# Patient Record
Sex: FEMALE | Race: WHITE
[De-identification: names, ages, dates, MRNs, and addresses within clinical notes are randomized per-mention and may not be internally consistent; named-entity substitution may affect disease eponyms.]

---

## 2019-07-30 ENCOUNTER — HOSPITAL ENCOUNTER (EMERGENCY)
Dept: HOSPITAL 97 - ER | Age: 69
Discharge: HOME | End: 2019-07-30
Payer: COMMERCIAL

## 2019-07-30 DIAGNOSIS — S76.011A: Primary | ICD-10-CM

## 2019-07-30 DIAGNOSIS — J44.9: ICD-10-CM
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
## 2019-07-30 DIAGNOSIS — I73.00: ICD-10-CM

## 2019-07-30 DIAGNOSIS — E03.9: ICD-10-CM

## 2019-07-30 DIAGNOSIS — Z88.5: ICD-10-CM

## 2019-07-30 PROCEDURE — 73502 X-RAY EXAM HIP UNI 2-3 VIEWS: CPT

## 2019-07-30 PROCEDURE — 99284 EMERGENCY DEPT VISIT MOD MDM: CPT

## 2019-07-30 PROCEDURE — 96375 TX/PRO/DX INJ NEW DRUG ADDON: CPT

## 2019-07-30 PROCEDURE — 72170 X-RAY EXAM OF PELVIS: CPT

## 2019-07-30 PROCEDURE — 72192 CT PELVIS W/O DYE: CPT

## 2019-07-30 PROCEDURE — 96374 THER/PROPH/DIAG INJ IV PUSH: CPT

## 2019-07-30 NOTE — EDPHYS
Physician Documentation                                                                           

 [facility]                                                                 

Name: Yarelis Maurice                                                                                

Age: 69 yrs                                                                                       

Sex: Female                                                                                       

: 1950                                                                                   

MRN: [id number]                                                                                   

Arrival Date: 2019                                                                          

Time: 08:07                                                                                       

Account#: [id number]                                                                            

Bed 15                                                                                            

Private MD:                                                                                       

ED Physician Steffen Salazar                                                                         

HPI:                                                                                              

                                                                                             

08:07 This 69 yrs old  Female presents to ER via Unassigned with complaints of Hip   rn  

      Pain.                                                                                       

08:07 The patient or guardian reports decreased range of motion, pain. that occurred at home, rn  

      sustained from standing and rotation the right lower extremity is shortened, the right      

      leg is internally rotated, The patient is able to ambulate with assistance. The patient     

      is able to bear partial body weight. There is no radiation of the patient's discomfort.     

      The complaints affect the right hip. Onset: The symptoms/episode began/occurred this        

      morning. Modifying factors: The symptoms are alleviated by nothing, the symptoms are        

      aggravated by nothing. Associated signs and symptoms: Loss of consciousness: the            

      patient experienced no loss of consciousness, Pertinent positives: None. Pertinent          

      negatives: abdominal pain, fever, incontinence, vomiting, weakness. Severity of             

      symptoms: At their worst the symptoms were moderate, in the emergency department the        

      symptoms are unchanged. The patient has not experienced similar symptoms in the past.       

      Reports walking/standing, rotated and felt pain to right hip, was able to "hop around",     

      no fall or direct trauma, did not hear pop or snap. Hurts to move right hip. .              

                                                                                                  

Historical:                                                                                       

- Allergies:                                                                                      

08:20 Codeine;                                                                                jl7 

- Home Meds:                                                                                      

08:20 alprazolam 0.25 mg oral tab [Active]; amitriptyline 50 mg Oral tab 2 times per day      jl7 

      [Active]; levothyroxine 25 mcg oral tab [Active]; Vitamin D Oral [Active]; alendronate      

      70 mg oral tab once wkly [Active]; omeprazole 20 mg Oral cpDR 1 cap 2 times per day         

      [Active]; sulfasalazine 500 mg Oral tab [Active];                                           

- PMHx:                                                                                           

08:20 COPD; Hypothyroidism; Raynaud's Disease; Scleroderma; Rheumatoid Arthritis; Born with 1 jl7 

      kidney; Osteoporosis;                                                                       

- PSHx:                                                                                           

08:20 Hysterectomy; rectal reconstruction;                                                    jl7 

                                                                                                  

- Immunization history:: Adult Immunizations unknown.                                             

- Family history:: not pertinent.                                                                 

- Ebola Screening: : No symptoms or risks identified at this time.                                

- Social history:: Smoking status: unknown.                                                       

- Hospitalizations: : No recent hospitalization is reported.                                      

                                                                                                  

                                                                                                  

ROS:                                                                                              

08:07 Constitutional: Negative for fever, chills, and weight loss, Abdomen/GI: Negative for   rn  

      abdominal pain, nausea, vomiting, diarrhea, and constipation, Back: Negative for injury     

      and pain, MS/Extremity: + right hip injury and pain Skin: Negative for injury, rash,        

      and discoloration, Neuro: Negative for headache, weakness, numbness, tingling, and          

      seizure.                                                                                    

                                                                                                  

Exam:                                                                                             

08:07 Constitutional:  This is a well developed, well nourished patient who is awake, alert,  rn  

      pain when being moved over to bed from stretcher Skin:  Warm, dry with normal turgor.       

      Normal color with no rashes, no lesions, and no evidence of cellulitis. MS/ Extremity:      

      Pulses equal, no cyanosis.  Neurovascular intact.  RLE passive internal rotation and        

      shortened, painful ROM at hip, able to rotate in and out with hip with less pain, focal     

      tenderness over right hip                                                                   

                                                                                                  

Vital Signs:                                                                                      

08:20  / 59; Pulse 69; Resp 18; Temp 97.7; Pulse Ox 100% ; Pain 10/10;                  jl7 

09:10  / 71; Pulse 60; Resp 16; Pulse Ox 100% ; Pain 0/10;                              jl7 

09:10 Pain 0/10;                                                                              jl7 

10:30  / 70; Pulse 61; Resp 16; Pulse Ox 98% ; Pain 0/10;                               jl7 

10:49  / 57; Pulse 71; Resp 16; Pulse Ox 100% ; Pain 0/10;                              jl7 

                                                                                                  

MDM:                                                                                              

08:07 Patient medically screened.                                                             rn  

10:28 ED course: No gross changes in ECG compared to previous on 19.                     rn  

10:28 Differential diagnosis: hip fracture, intertrochanteric fracture, femoral neck          rn  

      fracture, bursitis, arthritis, strain. Data reviewed: vital signs, nurses notes,            

      radiologic studies, CT scan, plain films, and as a result, I will discharge patient.        

      Counseling: I had a detailed discussion with the patient and/or guardian regarding: the     

      historical points, exam findings, and any diagnostic results supporting the                 

      discharge/admit diagnosis, radiology results, the need for outpatient follow up, to         

      return to the emergency department if symptoms worsen or persist or if there are any        

      questions or concerns that arise at home. Response to treatment: the patient's symptoms     

      have markedly improved after treatment, and as a result, I will discharge patient.          

      Special discussion: I discussed with the patient/guardian in detail that at this point      

      there is no indication for admission to the hospital. It is understood, however, that       

      if the symptoms persist or worsen the patient needs to return immediately for               

      re-evaluation.                                                                              

                                                                                                  

                                                                                             

08:07 Order name: XRAY Pelvis; Complete Time: 09:                                           rn  

                                                                                             

08:07 Order name: XRAY Hip RIGHT 2 view; Complete Time: :                                 rn  

                                                                                             

09:46 Order name: CT Pelvis wo Cont; Complete Time: 10:                                     rn  

                                                                                                  

Administered Medications:                                                                         

08:38 Drug: Zofran 4 mg Route: IVP; Site: right antecubital;                                  jl7 

09:29 Follow up: Response: No adverse reaction                                                jl7 

08:40 Drug: morphine 2 mg Route: IVP; Site: right antecubital;                                jl7 

09:10 Follow up: Pain 0/10 Adult; Response: No adverse reaction; Pain is decreased            jl7 

10:33 Drug: morphine 2 mg Route: IVP; Site: right antecubital;                                jl7 

10:46 Follow up: Response: No adverse reaction; Pain is decreased                             jl7 

10:33 Drug: Flexeril 10 mg Route: PO;                                                         jl7 

10:46 Follow up: Response: No adverse reaction                                                jl7 

                                                                                                  

                                                                                                  

Disposition:                                                                                      

19 10:29 Discharged to Home. Impression: Strain of muscle, fascia and tendon of right       

  hip.                                                                                            

- Condition is Stable.                                                                            

- Discharge Instructions: Muscle Strain, Hip Pain.                                                

- Prescriptions for Ultram 50 mg Oral Tablet - take 1 tablet by ORAL route every 6                

  hours As needed; 15 tablet. Cyclobenzaprine 10 mg Oral Tablet - take 1 tablet by ORAL           

  route every 8 hours As needed; 15 tablet. promethazine 25 mg Oral Tablet - take 1               

  tablet by ORAL route every 6 hours As needed; 20 tablet.                                        

- Medication Reconciliation Form, Thank You Letter, Antibiotic Education, Prescription            

  Opioid Use form.                                                                                

- Follow up: Private Physician; When: As needed; Reason: Recheck today's complaints,              

  Re-evaluation by your physician.                                                                

- Problem is new.                                                                                 

- Symptoms have improved.                                                                         

                                                                                                  

                                                                                                  

                                                                                                  

Signatures:                                                                                       

Dispatcher [employer]                           EDMS                                                 

Salazar, Steffen, MD                        MD   rn                                                   

Alaniz, Jahala, RN                        RN   jl7                                                  

                                                                                                  

Corrections: (The following items were deleted from the chart)                                    

10:50 10:29 2019 10:29 Discharged to Home. Impression: Strain of muscle, fascia and     jl7 

      tendon of right hip. Condition is Stable. Forms are Medication Reconciliation Form,         

      Thank You Letter, Antibiotic Education, Prescription Opioid Use. Follow up: Private         

      Physician; When: As needed; Reason: Recheck today's complaints, Re-evaluation by your       

      physician. Problem is new. Symptoms have improved. rn                                       

                                                                                                  

**************************************************************************************************

## 2019-07-30 NOTE — XMS REPORT
Patient Summary Document

 Created on:2019



Patient:MARKIE CORTES

Sex:Female

:1950

External Reference #:[id number]





Demographics







 Address  [address] [place] [address]

 

 Home Phone  [phone]

 

 Email Address  [email or web address]

 

 Preferred Language  Unknown

 

 Marital Status  Unknown

 

 [employer] Affiliation  Unknown

 

 Race  Unknown

 

 Additional Race(s)  Unavailable

 

 Ethnic Group  Unknown









Author







 Organization  [facility]

 

 Address  [address] [place] [address] [address]. Franks [address] [place] [id number]

 

 Phone  [phone]









Care Team Providers







 Name  Role  Phone

 

 Unavailable  Unavailable  Unavailable









Problems

This patient has no known problems.



Allergies, Adverse Reactions, Alerts

This patient has no known allergies or adverse reactions.



Medications

This patient has no known medications.

## 2019-07-30 NOTE — RAD REPORT
EXAM DESCRIPTION:  RAD - Pelvis - 7/30/2019 9:12 am

 

CLINICAL HISTORY:  PAIN

 

COMPARISON:  No comparisons

 

FINDINGS:  No fracture, dislocation or radiographic evidence of AVN.

 

IMPRESSION:  Negative study.

## 2019-07-30 NOTE — RAD REPORT
EXAM DESCRIPTION:  RAD - Hip Right 2 View - 7/30/2019 9:12 am

 

CLINICAL HISTORY:  PAIN

 

COMPARISON:  No comparisons

 

FINDINGS:  No fracture or dislocation seen. No finding to indicate AVN. Vascular calcification eviden
t.

## 2019-07-30 NOTE — RAD REPORT
EXAM DESCRIPTION:  CT - Pelvis Wo Cont - 7/30/2019 10:09 am

 

CLINICAL HISTORY:  PAIN

Trauma, fall, right-sided hip and pelvic pain

 

COMPARISON:  Pelvis dated 7/30/2019; Hip Right 2 View dated 7/30/2019

 

TECHNIQUE:  All CT scans are performed using dose optimization technique as appropriate and may inclu
de automated exposure control or mA/KV adjustment according to patient size.

 

FINDINGS:  No acute fracture or dislocation is seen. No evidence of AVN. No pelvic fracture seen. Sac
ral ala are intact. Moderate lower lumbar degenerative changes.

 

No pelvic mass or hematoma seen.

 

IMPRESSION:  No acute finding demonstrated.

## 2019-07-30 NOTE — ER
Nurse's Notes                                                                                     

 [facility]                                                                 

Name: Yarelis Maurice                                                                                

Age: 69 yrs                                                                                       

Sex: Female                                                                                       

: 1950                                                                                   

MRN: [id number]                                                                                   

Arrival Date: 2019                                                                          

Time: 08:07                                                                                       

Account#: [id number]                                                                            

Bed 15                                                                                            

Private MD:                                                                                       

Diagnosis: Strain of muscle, fascia and tendon of right hip                                       

                                                                                                  

Presentation:                                                                                     

                                                                                             

08:12 Presenting complaint: EMS states: Pt sitting in chair, got up and turned and felt       jl7 

      immediate pain to her right hip. Transition of care: patient was not received from          

      another setting of care. Onset of symptoms was 2019. Risk Assessment: Do you       

      want to hurt yourself or someone else? Patient reports no desire to harm self or            

      others. Initial Sepsis Screen: Does the patient meet any 2 criteria? No. Patient's          

      initial sepsis screen is negative. Does the patient have a suspected source of              

      infection? No. Patient's initial sepsis screen is negative. Care prior to arrival:          

      Medication(s) given: Tylenol, 1000 mg.                                                      

08:12 Method Of Arrival: EMS: [facility]                                                jl7 

08:12 Acuity: VALERIE 4                                                                           jl7 

                                                                                                  

Historical:                                                                                       

- Allergies:                                                                                      

08:20 Codeine;                                                                                jl7 

- Home Meds:                                                                                      

08:20 alprazolam 0.25 mg oral tab [Active]; amitriptyline 50 mg Oral tab 2 times per day      jl7 

      [Active]; levothyroxine 25 mcg oral tab [Active]; Vitamin D Oral [Active]; alendronate      

      70 mg oral tab once wkly [Active]; omeprazole 20 mg Oral cpDR 1 cap 2 times per day         

      [Active]; sulfasalazine 500 mg Oral tab [Active];                                           

- PMHx:                                                                                           

08:20 COPD; Hypothyroidism; Raynaud's Disease; Scleroderma; Rheumatoid Arthritis; Born with 1 jl7 

      kidney; Osteoporosis;                                                                       

- PSHx:                                                                                           

08:20 Hysterectomy; rectal reconstruction;                                                    jl7 

                                                                                                  

- Immunization history:: Adult Immunizations unknown.                                             

- Family history:: not pertinent.                                                                 

- Ebola Screening: : No symptoms or risks identified at this time.                                

- Social history:: Smoking status: unknown.                                                       

- Hospitalizations: : No recent hospitalization is reported.                                      

                                                                                                  

                                                                                                  

Screenin:10 Abuse screen: Denies threats or abuse. Denies injuries from another. Nutritional        jl7 

      screening: No deficits noted. Tuberculosis screening: No symptoms or risk factors           

      identified. Fall Risk No fall in past 12 months (0 pts). No secondary diagnosis (0          

      pts). IV access (20 points). Ambulatory Aid- None/Bed Rest/Nurse Assist (0 pts). Gait-      

      Impaired (20 pts.). Mental Status- Oriented to own ability (0 pts). Total Salas Fall        

      Scale indicates Low Risk Score (25-44 pts). Fall prevention measures have been              

      instituted. Side Rails Up X 2 Placed close to Nursing Station Frequent Obs/Assesments       

      occuring Family Present and informed to notify staff if they need to leave bedside As       

      available Patient and Family Educated on Fall Prevention Program and strategies.            

                                                                                                  

Assessment:                                                                                       

09:10 Reassessment: Patient appears in no apparent distress at this time. No changes from     [facility] 

      previously documented assessment. Patient and/or family updated on plan of care and         

      expected duration. Pain level reassessed. Patient is alert, oriented x 3, equal             

      unlabored respirations, skin warm/dry/pink. Pt states "I have no pain right now but         

      when a spasm hits it's off the charts." Patient states feeling better. Patient states       

      symptoms have improved. Pain: Denies pain.                                                  

09:34 Reassessment: Dr. Salazar at bedside discussing plan of care.                             jl7 

09:56 Reassessment: Assisted pt to bathroom, pt able to ambulate slowly with assistance at    [facility] 

      this time.                                                                                  

10:30 Reassessment: Patient appears in no apparent distress at this time. No changes from     7 

      previously documented assessment. Patient and/or family updated on plan of care and         

      expected duration. Pain level reassessed. Patient is alert, oriented x 3, equal             

      unlabored respirations, skin warm/dry/pink. Patient denies pain at this time.               

                                                                                                  

Vital Signs:                                                                                      

08:20  / 59; Pulse 69; Resp 18; Temp 97.7; Pulse Ox 100% ; Pain 10/10;                  jl7 

09:10  / 71; Pulse 60; Resp 16; Pulse Ox 100% ; Pain 0/10;                              jl7 

09:10 Pain 0/10;                                                                              jl7 

10:30  / 70; Pulse 61; Resp 16; Pulse Ox 98% ; Pain 0/10;                               jl7 

10:49  / 57; Pulse 71; Resp 16; Pulse Ox 100% ; Pain 0/10;                              jl7 

                                                                                                  

ED Course:                                                                                        

08:07 Patient arrived in ED.                                                                  hj  

08:07 Steffen Salazar MD is Attending Physician.                                                rn  

08:12 Shauna Alaniz RN is Primary Nurse.                                                      jl7 

08:14 Triage completed.                                                                       jl7 

08:20 Arm band placed on right wrist.                                                         jl7 

08:30 Patient has correct armband on for positive identification. Bed in low position. Call   jl7 

      light in reach. Side rails up X2. Pulse ox on. NIBP on. Warm blanket given.                 

08:35 No provider procedures requiring assistance completed. Inserted saline lock: 22 gauge   jl7 

      in right antecubital area, using aseptic technique.                                         

09:18 XRAY Pelvis In Process Unspecified.                                                     EDMS

09:18 XRAY Hip RIGHT 2 view In Process Unspecified.                                           EDMS

09:32 Warm blanket given.                                                                     jl7 

10:13 CT Pelvis wo Cont In Process Unspecified.                                               EDMS

10:20 CT completed. Patient tolerated procedure well. Patient moved to CT via stretcher.      jg6 

      Patient moved back from CT.                                                                 

10:50 IV discontinued, intact, bleeding controlled, No redness/swelling at site. Pressure     jl7 

      dressing applied.                                                                           

                                                                                                  

Administered Medications:                                                                         

08:38 Drug: Zofran 4 mg Route: IVP; Site: right antecubital;                                  jl7 

09:29 Follow up: Response: No adverse reaction                                                jl7 

08:40 Drug: morphine 2 mg Route: IVP; Site: right antecubital;                                jl7 

09:10 Follow up: Pain 0/10 Adult; Response: No adverse reaction; Pain is decreased            jl7 

10:33 Drug: morphine 2 mg Route: IVP; Site: right antecubital;                                jl7 

10:46 Follow up: Response: No adverse reaction; Pain is decreased                             jl7 

10:33 Drug: Flexeril 10 mg Route: PO;                                                         jl7 

10:46 Follow up: Response: No adverse reaction                                                jl7 

                                                                                                  

                                                                                                  

Outcome:                                                                                          

10:29 Discharge ordered by MD.                                                                rn  

10:49 Discharged to home via wheelchair, with family.                                         jl7 

10:49 Condition: stable                                                                           

10:49 Discharge instructions given to patient, family, Instructed on discharge instructions,      

      follow up and referral plans. medication usage, Demonstrated understanding of               

      instructions, follow-up care, medications.                                                  

10:50 Patient left the ED.                                                                    jl7 

                                                                                                  

Signatures:                                                                                       

Dispatcher [employer]                           EDMS                                                 

Steffen Salazar MD MD rn Joaquin, Henry, RN RN hj Leal, Jahala, RN RN jl7 Garcia, Jessica                              jg6                                                  

                                                                                                  

**************************************************************************************************

## 2021-06-30 ENCOUNTER — HOSPITAL ENCOUNTER (INPATIENT)
Dept: HOSPITAL 97 - ER | Age: 71
LOS: 4 days | Discharge: HOME HEALTH | DRG: 545 | End: 2021-07-04
Attending: HOSPITALIST | Admitting: HOSPITALIST
Payer: COMMERCIAL

## 2021-06-30 VITALS — BODY MASS INDEX: 27.3 KG/M2

## 2021-06-30 DIAGNOSIS — E83.42: ICD-10-CM

## 2021-06-30 DIAGNOSIS — E87.5: ICD-10-CM

## 2021-06-30 DIAGNOSIS — J44.9: ICD-10-CM

## 2021-06-30 DIAGNOSIS — I73.00: ICD-10-CM
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
## 2021-06-30 DIAGNOSIS — S42.301A: ICD-10-CM

## 2021-06-30 DIAGNOSIS — M81.0: ICD-10-CM

## 2021-06-30 DIAGNOSIS — G93.41: ICD-10-CM

## 2021-06-30 DIAGNOSIS — N18.32: ICD-10-CM

## 2021-06-30 DIAGNOSIS — N17.9: ICD-10-CM

## 2021-06-30 DIAGNOSIS — E86.1: ICD-10-CM

## 2021-06-30 DIAGNOSIS — D63.8: ICD-10-CM

## 2021-06-30 DIAGNOSIS — N17.0: ICD-10-CM

## 2021-06-30 DIAGNOSIS — M06.9: Primary | ICD-10-CM

## 2021-06-30 DIAGNOSIS — I12.9: ICD-10-CM

## 2021-06-30 DIAGNOSIS — N18.30: ICD-10-CM

## 2021-06-30 DIAGNOSIS — E03.9: ICD-10-CM

## 2021-06-30 LAB
ALBUMIN SERPL BCP-MCNC: 3.6 G/DL (ref 3.4–5)
ALP SERPL-CCNC: 88 U/L (ref 45–117)
ALT SERPL W P-5'-P-CCNC: 17 U/L (ref 12–78)
AST SERPL W P-5'-P-CCNC: 17 U/L (ref 15–37)
BLD SMEAR INTERP: (no result)
BUN BLD-MCNC: 26 MG/DL (ref 7–18)
GLUCOSE SERPLBLD-MCNC: 104 MG/DL (ref 74–106)
HCT VFR BLD CALC: 39.2 % (ref 36–45)
INR BLD: 1.1
LYMPHOCYTES # SPEC AUTO: 1.2 K/UL (ref 0.7–4.9)
MAGNESIUM SERPL-MCNC: 1.9 MG/DL (ref 1.8–2.4)
MORPHOLOGY BLD-IMP: (no result)
PMV BLD: 8.4 FL (ref 7.6–11.3)
POTASSIUM SERPL-SCNC: 4.8 MMOL/L (ref 3.5–5.1)
RBC # BLD: 4.19 M/UL (ref 3.86–4.86)
TROPONIN (EMERG DEPT USE ONLY): 0.03 NG/ML (ref 0–0.04)

## 2021-06-30 PROCEDURE — 85025 COMPLETE CBC W/AUTO DIFF WBC: CPT

## 2021-06-30 PROCEDURE — 82947 ASSAY GLUCOSE BLOOD QUANT: CPT

## 2021-06-30 PROCEDURE — 99285 EMERGENCY DEPT VISIT HI MDM: CPT

## 2021-06-30 PROCEDURE — 71045 X-RAY EXAM CHEST 1 VIEW: CPT

## 2021-06-30 PROCEDURE — 94760 N-INVAS EAR/PLS OXIMETRY 1: CPT

## 2021-06-30 PROCEDURE — 90714 TD VACC NO PRESV 7 YRS+ IM: CPT

## 2021-06-30 PROCEDURE — 83880 ASSAY OF NATRIURETIC PEPTIDE: CPT

## 2021-06-30 PROCEDURE — 80048 BASIC METABOLIC PNL TOTAL CA: CPT

## 2021-06-30 PROCEDURE — 84100 ASSAY OF PHOSPHORUS: CPT

## 2021-06-30 PROCEDURE — 93005 ELECTROCARDIOGRAM TRACING: CPT

## 2021-06-30 PROCEDURE — 81001 URINALYSIS AUTO W/SCOPE: CPT

## 2021-06-30 PROCEDURE — 70486 CT MAXILLOFACIAL W/O DYE: CPT

## 2021-06-30 PROCEDURE — 87086 URINE CULTURE/COLONY COUNT: CPT

## 2021-06-30 PROCEDURE — 36415 COLL VENOUS BLD VENIPUNCTURE: CPT

## 2021-06-30 PROCEDURE — 72125 CT NECK SPINE W/O DYE: CPT

## 2021-06-30 PROCEDURE — 87088 URINE BACTERIA CULTURE: CPT

## 2021-06-30 PROCEDURE — 90471 IMMUNIZATION ADMIN: CPT

## 2021-06-30 PROCEDURE — 85610 PROTHROMBIN TIME: CPT

## 2021-06-30 PROCEDURE — 80076 HEPATIC FUNCTION PANEL: CPT

## 2021-06-30 PROCEDURE — 97530 THERAPEUTIC ACTIVITIES: CPT

## 2021-06-30 PROCEDURE — 97161 PT EVAL LOW COMPLEX 20 MIN: CPT

## 2021-06-30 PROCEDURE — 80069 RENAL FUNCTION PANEL: CPT

## 2021-06-30 PROCEDURE — 70450 CT HEAD/BRAIN W/O DYE: CPT

## 2021-06-30 PROCEDURE — 76377 3D RENDER W/INTRP POSTPROCES: CPT

## 2021-06-30 PROCEDURE — 84484 ASSAY OF TROPONIN QUANT: CPT

## 2021-06-30 PROCEDURE — 97116 GAIT TRAINING THERAPY: CPT

## 2021-06-30 PROCEDURE — 83735 ASSAY OF MAGNESIUM: CPT

## 2021-06-30 RX ADMIN — FENTANYL CITRATE PRN MCG: 50 INJECTION, SOLUTION INTRAMUSCULAR; INTRAVENOUS at 15:40

## 2021-06-30 RX ADMIN — Medication SCH ML: at 21:00

## 2021-06-30 RX ADMIN — SODIUM CHLORIDE SCH MLS: 0.9 INJECTION, SOLUTION INTRAVENOUS at 20:38

## 2021-06-30 RX ADMIN — FENTANYL CITRATE PRN MCG: 50 INJECTION, SOLUTION INTRAMUSCULAR; INTRAVENOUS at 18:48

## 2021-06-30 RX ADMIN — SODIUM CHLORIDE SCH MLS: 0.9 INJECTION, SOLUTION INTRAVENOUS at 15:14

## 2021-06-30 RX ADMIN — HEPARIN SODIUM SCH UNIT: 5000 INJECTION, SOLUTION INTRAVENOUS; SUBCUTANEOUS at 17:00

## 2021-06-30 NOTE — RAD REPORT
EXAM DESCRIPTION:  CT - Head C Spine Mpr Wo Con - 6/30/2021 7:39 am

 

CLINICAL HISTORY:  Head and neck injury status post fall. Head and neck pain

 

COMPARISON:  None.

 

TECHNIQUE:  Computed axial tomography of the head and cervical spine was obtained.

 

Sagittal and coronal reconstruction was performed.

 

All CT scans are performed using dose optimization technique as appropriate and may include automated
 exposure control or mA/KV adjustment according to patient size.

 

FINDINGS:  Right supraorbital hematoma. Minimally displaced right zygomatic arch fracture.

 

Mild low-density areas within periventricular deep white matter likely ischemic changes secondary to 
small vessel disease

 

An intracranial bleed is not seen. The ventricles are normal in caliber. An extra-axial fluid collect
ion is not noted.Fluid within the visualized sinuses and mastoids is not seen

 

A cervical fracture is not visualized. No dislocation is noted.

 

IMPRESSION:  No acute intracranial abnormality is seen.

 

Minimally displaced right zygomatic arch fracture

 

A cervical fracture is not visualized.  If the patient continues to have symptoms to suggest intracra
nial /spinal cord pathology then MRI would be recommended

## 2021-06-30 NOTE — P.HP
Certification for Inpatient


Patient admitted to: Observation


With expected LOS: <2 Midnights


Practitioner: I am a practitioner with admitting privileges, knowledge of 

patient current condition, hospital course, and medical plan of care.


Services: Services provided to patient in accordance with Admission requirements

found in Title 42 Section 412.3 of the Code of Federal Regulations





Patient History


Date of Service: 06/30/21


Reason for admission: Fall with humeral fracture


History of Present Illness: 


[age]-year-old woman with a history of rheumatoid arthritis, hypothyroidism was 

brought to the emergency department after a fall at home.  Patient stated she 

fell when got up from her recliner in the middle of the night to go and sleep on

her bed.  She thought she might have turned around quickly.  She hit the right 

side of her head and sustained laceration temple.  She could not get up and 

walk.  She developed pain.   was woken by their dogs a few hours later 

and found her lying in a pool of blood.  According to the  patient 

responding to him appropriately, no loss of consciousness but she was in pain.  

She was brought to the emergency department where x-rays showing minimal to 

moderately displaced right femoral fracture, no dislocation.  Patient needing a 

lot of pain medications for pain control.  The ED physician wishes to place 

patient under observation for pain control.





Allergies





codeine Allergy (Unverified 11/05/17 06:22)


   Unknown








- Past Medical/Surgical History


Diabetic: No


-: Rheumatoid arthritis


-: Raynaud's disease


-: Hypothyroidism


-: Hysterectomy


-: Rectal reconstruction surgery





- Family History


  ** Father


-: Cancer (Lungs)





  ** Brother


-: Cancer (Lung)





- Social History


Smoking Status: Never smoker


Alcohol use: No


CD- Drugs: No


Place of Residence: Home





Review of Systems


Other: 


Patient denied any dizziness, or palpitation or chest pain or diaphoresis 

preceding the fall.





She denies any fever or chills or abdominal pain or nausea.





She denies any diarrhea or vomiting.





Except as documented, all other systems reviewed and negative.





Physical Examination





- Physical Exam


General: Alert, In no apparent distress, Oriented x3


HEENT: Mucous membr. moist/pink, Other (small laceration right temple.)


Neck: Supple, JVD not distended


Respiratory: Clear to auscultation bilaterally, Normal air movement


Cardiovascular: No edema, Regular rate/rhythm, Normal S1 S2


Capillary refill: <2 Seconds


Gastrointestinal: Normal bowel sounds, Soft and benign, Non-distended, No 

tenderness


Musculoskeletal: Tenderness (right arm), Other (cyanotic fingers)


Integumentary: No rashes


Neurological: Normal strength at 5/5 x4 extr, Cranial nerves 3-12 intact


Lymphatics: No axilla or inguinal lymphadenopathy





- Studies


Laboratory Data (last 24 hrs)





06/30/21 06:46: PT 12.7 H, INR 1.10


06/30/21 06:46: WBC 9.20, Hgb 12.4, Hct 39.2, Plt Count 165


06/30/21 06:46: Sodium 141, Potassium 4.8, BUN 26 H, Creatinine 2.19 H, Glucose 

104, Magnesium 1.9, Total Bilirubin 0.3, AST 17, ALT 17, Alkaline Phosphatase 88








Assessment and Plan





- Problems (Diagnosis)


(1) Fall


Current Visit: Yes   Status: Acute   





(2) Humeral fracture


Current Visit: Yes   Status: Acute   





(3) Rheumatoid arthritis


Current Visit: Yes   Status: Acute   





(4) Acute renal failure


Current Visit: Yes   Status: Acute   





- Plan


Place under observation.


Pain management with IV fentanyl, Norco and Naprosyn


Will also add gabapentin for nerve pain.


Hydrate with normal saline and monitor renal function for improvement.


Consulting nephrology.


Sling for right humeral fracture.


PT to eval low.


24 hr telemetry.








- Advance Directives


Does patient have a Living Will: No


Does patient have a Durable POA for Healthcare: No

## 2021-06-30 NOTE — RAD REPORT
EXAM DESCRIPTION:  RAD - Shoulder  Right 2 View - 6/30/2021 8:25 am

 

CLINICAL HISTORY:  Right shoulder pain

 

FINDINGS:  Mildly to moderately displaced fracture right humeral neck. No dislocation

## 2021-06-30 NOTE — RAD REPORT
EXAM DESCRIPTION:  RAD - Humerus Right - 6/30/2021 8:26 am

 

CLINICAL HISTORY:   Right arm pain status post fall

 

FINDINGS:  Mildly to moderately displaced fracture right humeral neck. No dislocation

## 2021-06-30 NOTE — EDPHYS
Physician Documentation                                                                           

 [facility]                                                                 

Name: Yarelis Maurice                                                                                

Age: 71 yrs                                                                                       

Sex: Female                                                                                       

: 1950                                                                                   

MRN: [id number]                                                                                   

Arrival Date: 2021                                                                          

Time: 06:25                                                                                       

Account#: [id number]                                                                            

Bed 3                                                                                             

Private MD:                                                                                       

ED Physician Mike May                                                                       

HPI:                                                                                              

                                                                                             

06:49 This 71 yrs old  Female presents to ER via EMS with complaints of Fall Injury. ma2 

06:49 Details of fall: The patient fell from seated position, out of a chair. Onset: The      ma2 

      symptoms/episode began/occurred acutely, gradually, 1 hour(s) ago. Severity of              

      symptoms: At their worst the symptoms were mild, in the emergency department the            

      symptoms are unchanged. The patient has experienced similar episodes in the past.           

06:51 lost balance, fell hit her head and right elbow and shoulder .                          ma2 

                                                                                                  

Historical:                                                                                       

- Allergies:                                                                                      

06:33 Codeine;                                                                                bb  

06:33 sulfa;                                                                                  bb  

- Home Meds:                                                                                      

06:33 amitriptyline 50 mg Oral tab 2 times per day [Active]; alprazolam 0.25 mg Oral tab 1    bb  

      tab daily [Active]; alendronate 70 mg Oral tab once wkly [Active]; levothyroxine 25 mcg     

      tab [Active]; omeprazole 20 mg Oral cpDR 1 cap 2 times per day [Active]; sulfasalazine      

      500 mg Oral tab 2 tab after meals [Active]; VItamin D \T\ E [Active];                       

- PMHx:                                                                                           

06:33 born with 1 kidney; COPD; Hypothyroidism; Osteoporosis; Raynaud's Disease; Rheumatoid   bb  

      Arthritis; Scleroderma;                                                                     

- PSHx:                                                                                           

06:33 hysterectomy;                                                                           bb  

                                                                                                  

- Immunization history: Last tetanus immunization: unknown.                                       

- Social history:: Smoking status: Patient denies any tobacco usage or history of.                

- Family history:: not pertinent.                                                                 

                                                                                                  

                                                                                                  

ROS:                                                                                              

06:50 Constitutional: Negative for fever, chills, and weight loss, Cardiovascular: Negative   ma2 

      for chest pain, palpitations, and edema, Respiratory: Negative for shortness of breath,     

      cough, wheezing, and pleuritic chest pain, Abdomen/GI: Negative for abdominal pain,         

      nausea, diarrhea, and constipation.                                                         

06:51 All other systems are negative.                                                         ma2 

                                                                                                  

Exam:                                                                                             

06:51 Constitutional:  This is a well developed, well nourished patient who is awake, alert,  ma2 

      and in no acute distress. Head/Face:  right forehead contusion otherwise Normocephalic      

      Eyes:  Pupils equal round and reactive to light, extra-ocular motions intact.  Lids and     

      lashes normal.  Conjunctiva and sclera are non-icteric and not injected.  Cornea within     

      normal limits.  Periorbital areas with no swelling, redness, or edema. ENT:  Nares          

      patent. No nasal discharge, no septal abnormalities noted.  Tympanic membranes are          

      normal and external auditory canals are clear.  Oropharynx with no redness, swelling,       

      or masses, exudates, or evidence of obstruction, uvula midline.  Mucous membranes           

      moist. Neck:  Trachea midline, no thyromegaly or masses palpated, and no cervical           

      lymphadenopathy.  Supple, full range of motion without nuchal rigidity, or vertebral        

      point tenderness.  No Meningismus. Chest/axilla:  Normal chest wall appearance and          

      motion.  Nontender with no deformity.  No lesions are appreciated. Cardiovascular:          

      Regular rate and rhythm with a normal S1 and S2.  No gallops, murmurs, or rubs.  Normal     

      PMI, no JVD.  No pulse deficits. Respiratory:  Lungs have equal breath sounds               

      bilaterally, clear to auscultation and percussion.  No rales, rhonchi or wheezes noted.     

       No increased work of breathing, no retractions or nasal flaring. Abdomen/GI:  Soft,        

      non-tender, with normal bowel sounds.  No distension or tympany.  No guarding or            

      rebound.  No evidence of tenderness throughout. Back:  No spinal tenderness.  No            

      costovertebral tenderness.  Full range of motion. Skin:  Warm, dry with normal turgor.      

      Normal color with no rashes, no lesions, and no evidence of cellulitis. MS/ Extremity:      

      right elbow pain and right shoulder pain, there is right elbow skin abrasion with skin      

      tear. otherwise Pulses equal, no cyanosis.  Neurovascular intact.  Full, normal range       

      of motion. Neuro:  Awake and alert, GCS 15, oriented to person, place, time, and            

      situation.  Cranial nerves II-XII grossly intact.  Motor strength 5/5 in all                

      extremities.  Sensory grossly intact.  Cerebellar exam normal.  Normal gait.                

                                                                                                  

Vital Signs:                                                                                      

06:26  / 108; Pulse 92; Resp 18 S; Temp 98.3(O); Pulse Ox 93% on R/A; Weight 71.67 kg   bb  

      (R); Height 5 ft. 4 in. (162.56 cm) (R); Pain 10/10;                                        

07:15  / 88; Pulse 89; Resp 17; Pulse Ox 99% on R/A;                                    hb  

08:15  / 71; Pulse 84; Resp 15; Pulse Ox 97% on R/A; Pain 8/10;                         hb  

08:30 Pulse Ox 83% on R/A;                                                                    hb  

08:30 Pulse Ox 96% on 3 lpm NC;                                                               hb  

10:00  / 79; Pulse 66; Resp 15; Pulse Ox 97% on 3 lpm NC; Pain 9/10;                    hb  

11:00  / 78; Pulse 64; Resp 17; Pulse Ox 97% on 2 lpm NC;                               hb  

12:00  / 75; Pulse 68; Resp 15; Pulse Ox 97% on 2 lpm NC;                               hb  

13:00  / 82; Pulse 80; Resp 19; Pulse Ox 98% on 2 lpm NC;                               hb  

14:00  / 86; Pulse 67; Resp 18; Pulse Ox 95% on 2 lpm NC; Pain 9/10;                    hb  

06:26 Body Mass Index 27.12 (71.67 kg, 162.56 cm)                                             bb  

                                                                                                  

Naman Coma Score:                                                                               

06:26 Eye Response: spontaneous(4). Verbal Response: oriented(5). Motor Response: obeys       bb  

      commands(6). Total: 15.                                                                     

                                                                                                  

Trauma Score (Adult):                                                                             

06:26 Eye Response: spontaneous(1); Verbal Response: oriented(1); Motor Response: obeys       bb  

      commands(2); Systolic BP: > 89 mm Hg(4); Respiratory Rate: 10 to 29 per min(4); Hull     

      Score: 15; Trauma Score: 12                                                                 

07:15 Eye Response: spontaneous(1); Verbal Response: oriented(1); Motor Response: obeys       hb  

      commands(2); Systolic BP: > 89 mm Hg(4); Respiratory Rate: 10 to 29 per min(4); Naman     

      Score: 15; Trauma Score: 12                                                                 

08:15 Eye Response: spontaneous(1); Verbal Response: oriented(1); Motor Response: obeys       hb  

      commands(2); Systolic BP: > 89 mm Hg(4); Respiratory Rate: 10 to 29 per min(4); Naman     

      Score: 15; Trauma Score: 12                                                                 

10:00 Eye Response: spontaneous(1); Verbal Response: oriented(1); Motor Response: obeys       hb  

      commands(2); Systolic BP: > 89 mm Hg(4); Respiratory Rate: 10 to 29 per min(4); Hull     

      Score: 15; Trauma Score: 12                                                                 

11:00 Eye Response: spontaneous(1); Verbal Response: oriented(1); Motor Response: obeys       hb  

      commands(2); Systolic BP: > 89 mm Hg(4); Respiratory Rate: 10 to 29 per min(4); Hull     

      Score: 15; Trauma Score: 12                                                                 

12:00 Eye Response: spontaneous(1); Verbal Response: oriented(1); Motor Response: obeys       hb  

      commands(2); Systolic BP: > 89 mm Hg(4); Respiratory Rate: 10 to 29 per min(4); Hull     

      Score: 15; Trauma Score: 12                                                                 

13:00 Eye Response: spontaneous(1); Verbal Response: oriented(1); Motor Response: obeys       hb  

      commands(2); Systolic BP: > 89 mm Hg(4); Respiratory Rate: 10 to 29 per min(4); Naman     

      Score: 15; Trauma Score: 12                                                                 

14:00 Eye Response: spontaneous(1); Verbal Response: oriented(1); Motor Response: obeys       hb  

      commands(2); Systolic BP: > 89 mm Hg(4); Respiratory Rate: 10 to 29 per min(4); Hull     

      Score: 15; Trauma Score: 12                                                                 

                                                                                                  

MDM:                                                                                              

06:25 Patient medically screened.                                                             ma2 

06:51 Differential diagnosis: closed head injury, contusion, fracture, sprain, strain.        ma2 

09:47 Data reviewed: vital signs, nurses notes, lab test result(s), radiologic studies.       kdr 

      Counseling: I had a detailed discussion with the patient and/or guardian regarding: the     

      historical points, exam findings, and any diagnostic results supporting the                 

      discharge/admit diagnosis, lab results, radiology results, the need for further work-up     

      and treatment in the hospital.                                                              

                                                                                                  

                                                                                             

06:26 Order name: Basic Metabolic Panel; Complete Time: 08:43                                                                                                                              

06:26 Order name: CBC with Diff; Complete Time: 08:43                                                                                                                                      

06:26 Order name: LFT's; Complete Time: 08:43                                                                                                                                              

06:26 Order name: Magnesium; Complete Time: 08:43                                             ma2 

                                                                                             

06:26 Order name: NT PRO-BNP; Complete Time: 08:43                                            ma2 

                                                                                             

06:26 Order name: PT-INR; Complete Time: 08:43                                                ma2 

                                                                                             

06:26 Order name: CT Head C Spine; Complete Time: 08:43                                       ma2 

                                                                                             

06:26 Order name: Troponin (emerg Dept Use Only); Complete Time: 08:43                        ma2 

                                                                                             

06:26 Order name: XRAY Chest (1 view); Complete Time: 08:43                                   ma2 

                                                                                             

06:42 Order name: Facial Bones W/O Con CT; Complete Time: 08:43                               ma2 

                                                                                             

06:42 Order name: Shoulder Right (2 View) XRAY; Complete Time: 08:43                          ma2 

                                                                                             

06:42 Order name: Humerus Right XRAY; Complete Time: 08:43                                    ma2 

                                                                                             

07:03 Order name: CBC Smear Scan; Complete Time: 08:43                                        EDMS

                                                                                             

06:26 Order name: EKG; Complete Time: 06:27                                                   ma2 

                                                                                             

06:26 Order name: Cardiac monitoring; Complete Time: 06:58                                    ma2 

                                                                                             

06:26 Order name: EKG - Nurse/Tech; Complete Time: 06:58                                      ma2 

                                                                                             

06:26 Order name: IV Saline Lock; Complete Time: 06:35                                        ma2 

                                                                                             

06:26 Order name: Labs collected and sent; Complete Time: 06:58                               ma2 

                                                                                             

06:26 Order name: O2 Per Protocol; Complete Time: 06:35                                       ma2 

                                                                                             

06:26 Order name: O2 Sat Monitoring; Complete Time: 06:35                                     ma2 

                                                                                             

06:42 Order name: Dressing - Wound; Complete Time: 13:40                                      ma2 

                                                                                             

08:25 Order name: Elbow Right 2 View; Complete Time: 08:43                                    EDMS

                                                                                                  

Administered Medications:                                                                         

07:00 Drug: Ketorolac 30 mg Route: IVP; Site: left hand;                                      bb  

07:11 Follow up: Response: No adverse reaction                                                bb  

07:00 Drug: NS 0.9% 1000 ml Route: IV; Rate: 1 bolus; Site: left hand;                        bb  

07:00 Drug: Tetanus-Diphtheria Toxoid Adult 0.5 ml {: "[employer] Exp:         bb  

      2023. Lot #: [id number]. } Route: IM; Site: left deltoid;                                  

07:12 Follow up: Response: No adverse reaction                                                bb  

08:29 Drug: morphine 2 mg Route: IVP; Site: left wrist;                                       hb  

08:29 Drug: Zofran (Ondansetron) 4 mg Route: IVP; Site: left wrist;                           hb  

09:10 Follow up: Response: No adverse reaction                                                hb  

10:29 Drug: morphine 4 mg Route: IVP; Site: left antecubital;                                 hb  

13:40 Drug: morphine 2 mg Route: IVP; Site: left antecubital;                                 hb  

                                                                                                  

                                                                                                  

Disposition Summary:                                                                              

21 09:47                                                                                    

Hospitalization Ordered                                                                           

      Hospitalization Status: Observation                                                     kdr 

      Provider: Chapincito Maxwell 

      Condition: Fair                                                                         kdr 

      Problem: new                                                                            kdr 

      Symptoms: have improved                                                                 kdr 

      Bed/Room Type: Standard                                                                 kdr 

      Location: Telemetry/MedSurg (observation)(21 17:57)                               bd  

      Room Assignment: [facility](21 17:57)                                                    bd  

      Diagnosis                                                                                   

        - 2-part displaced fracture of surgical neck of right humerus                         kdr 

      Forms:                                                                                      

        - Medication Reconciliation Form                                                      kdr 

        - SBAR form                                                                           kdr 

Signatures:                                                                                       

Dispatcher [employer]                           EDMS                                                 

Cece Banerjee Kevin, MD MD   kdr                                                  

Yarelis Aquino RN RN bb Smirch, Shelby, RN RN ss Baxter, Heather, RN RN                                                      

Keke Cali MD MD   ma2                                                  

                                                                                                  

Corrections: (The following items were deleted from the chart)                                    

08:25 06:42 Elbow Right 3 View+RAD.RAD.BRZ ordered. EDMS                                      EDMS

14:08 09:47 Telemetry/MedSurg (observation) kdr                                               ss  

14:08 09:47 kdr                                                                               ss  

14:08 14:08 ss                                                                                ss  

17:57 14:08 [facility] ER HOLD ss                                                                   bd  

17:57 14:08 ERHOLD- ss                                                                        bd  

                                                                                                  

**************************************************************************************************

## 2021-06-30 NOTE — XMS REPORT
Continuity of Care Document

                            Created on:2021



Patient:MARKIE CORTES

Sex:Female

:1950

External Reference #:[id number]





Demographics







                          Address                   [address]



                                                    [place] [address]

 

                          Home Phone                [phone]

 

                          Work Phone                [phone]

 

                          Mobile Phone              [phone]

 

                          Email Address             [email or web address]

 

                          Preferred Language        English

 

                          Marital Status            Unknown

 

                          Hindu Affiliation     Unknown

 

                          Race                      Unknown

 

                          Additional Race(s)        Unavailable



                                                    White

 

                          Ethnic Group              Unknown









Author







                          Organization              [facility]

t

 

                          Address                   [address] [place] [address] [address]



                                                    [place] [address]

 

                          Phone                     [phone]









Support







                Name            Relationship    Address         Phone

 

                Josafat           Spouse          [address]   [phone]



                                                [place] [address] 









Care Team Providers







                    Name                Role                Phone

 

                    Doctor Unassigned,  Name Attending Clinician Unavailable









Problems

This patient has no known problems.



Allergies, Adverse Reactions, Alerts

This patient has no known allergies or adverse reactions.



Medications

This patient has no known medications.



Procedures

This patient has no known procedures.



Encounters







        Start   End     Encounter Admission Attending Care    Care    Encounter 

Source



        Date/Time Date/Time Type    Type    Clinicians Facility Department ID   

   

 

        2020 Orders          Doctor  LUIS    [id number]

05 



        00:00:00 00:00:00 Only            UnassignedLC   [id number]       

  



                                        [facility] [id number]         



                                                        [phone]         



                                                        009             







Results

This patient has no known results.

## 2021-06-30 NOTE — RAD REPORT
EXAM DESCRIPTION:  CT - Facial Bones W/ Mpr - 6/30/2021 7:38 am

 

CLINICAL HISTORY:  Facial injury status post fall. Facial pain

 

COMPARISON:   None

 

TECHNIQUE:  Computed axial tomography of the face was obtained. Coronal and sagittal reconstruction w
as performed.

 

All CT scans are performed using dose optimization technique as appropriate and may include automated
 exposure control or mA/KV adjustment according to patient size.

 

FINDINGS:  Minimally displaced fracture right zygomatic arch. Adjacent superficial hematoma.

 

A TMJ dislocation is not noted.

 

The globes are intact. Fluid within the sinuses is not seen.

 

IMPRESSION:  Minimally displaced fracture right zygomatic arch

## 2021-06-30 NOTE — RAD REPORT
EXAM DESCRIPTION:  RAD - Elbow Right 2 View - 6/30/2021 8:26 am

 

CLINICAL HISTORY:  Elbow pain

 

FINDINGS:  Limited two view series.

 

Osteoporosis

 

No fracture or dislocation seen

## 2021-06-30 NOTE — ER
Nurse's Notes                                                                                     

 [facility]                                                                 

Name: Yarelis Maurice                                                                                

Age: 71 yrs                                                                                       

Sex: Female                                                                                       

: 1950                                                                                   

MRN: [id number]                                                                                   

Arrival Date: 2021                                                                          

Time: 06:25                                                                                       

Account#: [id number]                                                                            

Bed 3                                                                                             

Private MD:                                                                                       

Diagnosis: 2-part displaced fracture of surgical neck of right humerus                            

                                                                                                  

Presentation:                                                                                     

                                                                                             

06:26 Chief complaint: EMS states: they were toned out for report of pt having fallen hitting bb  

      her head and injuring her right arm, pt denied LOC. Care prior to arrival: splint to        

      right arm. Mechanism of Injury: Fall out of chair. Trauma event details: Injury             

      occurred in the [facility], Injury occurred: at home. Injury occurred: 2021.                                                                                       

06:26 Acuity: VALERIE 3                                                                           bb  

06:26 Method Of Arrival: EMS: [facility]                                                  

:33 Coronavirus screen: At this time, the client does not indicate any symptoms associated  bb  

      with coronavirus-19. Ebola Screen: No symptoms or risks identified at this time.            

      Initial Sepsis Screen: Does the patient meet any 2 criteria? No. Patient's initial          

      sepsis screen is negative. Does the patient have a suspected source of infection? No.       

      Patient's initial sepsis screen is negative. Risk Assessment: Do you want to hurt           

      yourself or someone else? Patient reports no desire to harm self or others. Onset of        

      symptoms was 2021.                                                                 

                                                                                                  

Historical:                                                                                       

- Allergies:                                                                                      

06:33 Codeine;                                                                                bb  

06:33 sulfa;                                                                                  bb  

- Home Meds:                                                                                      

: amitriptyline 50 mg Oral tab 2 times per day [Active]; alprazolam 0.25 mg Oral tab 1    bb  

      tab daily [Active]; alendronate 70 mg Oral tab once wkly [Active]; levothyroxine 25 mcg     

      tab [Active]; omeprazole 20 mg Oral cpDR 1 cap 2 times per day [Active]; sulfasalazine      

      500 mg Oral tab 2 tab after meals [Active]; VItamin D \T\ E [Active];                       

- PMHx:                                                                                           

06:33 born with 1 kidney; COPD; Hypothyroidism; Osteoporosis; Raynaud's Disease; Rheumatoid   bb  

      Arthritis; Scleroderma;                                                                     

- PSHx:                                                                                           

:33 hysterectomy;                                                                           bb  

                                                                                                  

- Immunization history: Last tetanus immunization: unknown.                                       

- Social history:: Smoking status: Patient denies any tobacco usage or history of.                

- Family history:: not pertinent.                                                                 

                                                                                                  

                                                                                                  

Screenin:26 Abuse screen: Denies threats or abuse. Tuberculosis screening: No symptoms or risk      bb  

      factors identified.                                                                         

06:36 Nutritional screening: No deficits noted. Fall Risk Fall in past 12 months (25 points). bb  

      Secondary diagnosis (15 points) impaired mobility, IV access (20 points). Ambulatory        

      Aid- None/Bed Rest/Nurse Assist (0 pts). Gait- Impaired (20 pts.). Mental Status-           

      Overestimates/Forgets Limitations (15 pts.). Total [place] Fall Scale indicates High Risk     

      Score (45 or more points). Fall prevention measures have been instituted. Side Rails Up     

      X 2 As available patient and family educated on Fall Prevention Program and Strategies.     

                                                                                                  

Primary Survey:                                                                                   

06:26 NO uncontrolled hemorrhage observed. A: The patient is alert. Airway: patent.           bb  

      Breathing/Chest: Respiratory pattern: regular, Respiratory effort: spontaneous.             

      Circulation: Heart tones present. Disability Alert.                                         

07:15 Exposure/Environment: A warming method has been applied: A warm blanket has been        hb  

      provided to the patient.                                                                    

07:15 Reassessment Airway Airway Patent Breathing/Chest Respiratory pattern Regular           hb  

      Respiratory effort Spontaneous Unlabored Chest inspection Symmetrical Circulation Color     

      Pink Disability Alert.                                                                      

08:15 Reassessment Airway Oxygen No O2 Breathing/Chest Respiratory pattern Regular            hb  

      Respiratory effort Spontaneous Unlabored Chest inspection Symmetrical Circulation Color     

      Pink Disability Alert.                                                                      

09:00 Reassessment Airway Airway Patent Oxygen No O2 Breathing/Chest Respiratory pattern      hb  

      Regular Respiratory effort Spontaneous Unlabored Chest inspection Symmetrical               

      Circulation Pulses Palpable Color Pink Disability Alert.                                    

10:00 Reassessment Airway Oxygen Nasal cannula Breathing/Chest Respiratory pattern Regular    hb  

      Respiratory effort Spontaneous Unlabored Chest inspection Symmetrical Circulation           

      Pulses Palpable Color Pink Disability Alert.                                                

11:00 Reassessment Airway Airway Patent Oxygen Nasal cannula Breathing/Chest Respiratory      hb  

      effort Spontaneous Unlabored Chest inspection Symmetrical Circulation Pulses Palpable       

      Color Pink Disability Alert.                                                                

12:00 Reassessment Airway Oxygen Nasal cannula Breathing/Chest Respiratory effort Spontaneous hb  

      Unlabored Circulation Pulses Palpable Color Pink Disability Alert.                          

13:00 Reassessment Airway Oxygen Nasal cannula Circulation Color Pink Disability Alert.       hb  

14:00 Reassessment Airway Oxygen Nasal cannula Breathing/Chest Respiratory effort Spontaneous hb  

      Unlabored Circulation Pulses Palpable Color Pink Disability Alert.                          

                                                                                                  

Secondary Survey:                                                                                 

:26 HEENT: Face Other bruising to right side of face and brow with small skin tear to right bb  

      brow. Gastrointestinal: No deficits noted. Musculoskeletal: Capillary refill < 3            

      seconds, Reports pain in right arm.                                                         

                                                                                                  

Assessment:                                                                                       

06:26 General: Appears in no apparent distress. uncomfortable, Behavior is cooperative,       bb  

      anxious. Pain: Complains of pain in right arm Pain currently is 10 out of 10 on a pain      

      scale. Neuro: Level of Consciousness is awake, alert, obeys commands, Oriented to           

      person, place, situation. Cardiovascular: Heart tones S1 S2 present Capillary refill <      

      3 seconds. Respiratory: Respiratory effort is unlabored, Respiratory pattern is             

      regular. GI: No signs and/or symptoms were reported involving the gastrointestinal          

      system. Derm: Skin is pink, warm \T\ dry. Bruising that is right side of face and brow.     

      Skin tear to right brow and right elbow. Musculoskeletal: Capillary refill < 3 seconds,     

      Reports pain in right arm.                                                                  

07:15 Reassessment: Patient appears in no apparent distress at this time. Patient and/or      hb  

      family updated on plan of care and expected duration. Pain level reassessed. Patient is     

      alert, oriented x 3, equal unlabored respirations, skin warm/dry/pink.                      

08:00 Reassessment: Patient appears in no apparent distress at this time. Patient and/or      hb  

      family updated on plan of care and expected duration. Pain level reassessed. Patient is     

      alert, oriented x 3, equal unlabored respirations, skin warm/dry/pink.                      

09:00 Reassessment: Patient appears in no apparent distress at this time. Patient and/or      hb  

      family updated on plan of care and expected duration. Pain level reassessed. Patient is     

      alert, oriented x 3, equal unlabored respirations, skin warm/dry/pink.                      

10:00 Reassessment: Patient appears in no apparent distress at this time. Patient and/or      hb  

      family updated on plan of care and expected duration. Pain level reassessed. Patient is     

      alert, oriented x 3, equal unlabored respirations, skin warm/dry/pink.                      

11:00 Reassessment: Patient appears in no apparent distress at this time. Patient and/or      hb  

      family updated on plan of care and expected duration. Pain level reassessed. Patient is     

      alert, oriented x 3, equal unlabored respirations, skin warm/dry/pink.                      

12:00 Reassessment: Patient appears in no apparent distress at this time. Patient and/or      hb  

      family updated on plan of care and expected duration. Pain level reassessed. Patient is     

      alert, oriented x 3, equal unlabored respirations, skin warm/dry/pink.                      

13:00 Reassessment: Patient appears in no apparent distress at this time. Patient and/or      hb  

      family updated on plan of care and expected duration. Pain level reassessed. Patient is     

      alert, oriented x 3, equal unlabored respirations, skin warm/dry/pink.                      

19:35 Reassessment: Patient and/or family updated on plan of care and expected duration. Pain ea  

      level reassessed. Patient is alert, oriented x 3, equal unlabored respirations, skin        

      warm/dry/pink. Report given to receiving nurse. Pt left ED via wheelchair per nurse. Pt     

      tolerating well.                                                                            

                                                                                                  

Vital Signs:                                                                                      

06:26  / 108; Pulse 92; Resp 18 S; Temp 98.3(O); Pulse Ox 93% on R/A; Weight 71.67 kg   bb  

      (R); Height 5 ft. 4 in. (162.56 cm) (R); Pain 10/10;                                        

07:15  / 88; Pulse 89; Resp 17; Pulse Ox 99% on R/A;                                    hb  

08:15  / 71; Pulse 84; Resp 15; Pulse Ox 97% on R/A; Pain 8/10;                         hb  

08:30 Pulse Ox 83% on R/A;                                                                    hb  

08:30 Pulse Ox 96% on 3 lpm NC;                                                               hb  

10:00  / 79; Pulse 66; Resp 15; Pulse Ox 97% on 3 lpm NC; Pain 9/10;                    hb  

11:00  / 78; Pulse 64; Resp 17; Pulse Ox 97% on 2 lpm NC;                               hb  

12:00  / 75; Pulse 68; Resp 15; Pulse Ox 97% on 2 lpm NC;                               hb  

13:00  / 82; Pulse 80; Resp 19; Pulse Ox 98% on 2 lpm NC;                               hb  

14:00  / 86; Pulse 67; Resp 18; Pulse Ox 95% on 2 lpm NC; Pain 9/10;                    hb  

06:26 Body Mass Index 27.12 (71.67 kg, 162.56 cm)                                             bb  

                                                                                                  

[place] Coma Score:                                                                               

06:26 Eye Response: spontaneous(4). Verbal Response: oriented(5). Motor Response: obeys       bb  

      commands(6). Total: 15.                                                                     

                                                                                                  

Trauma Score (Adult):                                                                             

06:26 Eye Response: spontaneous(1); Verbal Response: oriented(1); Motor Response: obeys       bb  

      commands(2); Systolic BP: > 89 mm Hg(4); Respiratory Rate: 10 to 29 per min(4); [place]     

      Score: 15; Trauma Score: 12                                                                 

07:15 Eye Response: spontaneous(1); Verbal Response: oriented(1); Motor Response: obeys       hb  

      commands(2); Systolic BP: > 89 mm Hg(4); Respiratory Rate: 10 to 29 per min(4); [place]     

      Score: 15; Trauma Score: 12                                                                 

08:15 Eye Response: spontaneous(1); Verbal Response: oriented(1); Motor Response: obeys       hb  

      commands(2); Systolic BP: > 89 mm Hg(4); Respiratory Rate: 10 to 29 per min(4); [place]     

      Score: 15; Trauma Score: 12                                                                 

10:00 Eye Response: spontaneous(1); Verbal Response: oriented(1); Motor Response: obeys       hb  

      commands(2); Systolic BP: > 89 mm Hg(4); Respiratory Rate: 10 to 29 per min(4); [place]     

      Score: 15; Trauma Score: 12                                                                 

11:00 Eye Response: spontaneous(1); Verbal Response: oriented(1); Motor Response: obeys       hb  

      commands(2); Systolic BP: > 89 mm Hg(4); Respiratory Rate: 10 to 29 per min(4); [place]     

      Score: 15; Trauma Score: 12                                                                 

12:00 Eye Response: spontaneous(1); Verbal Response: oriented(1); Motor Response: obeys       hb  

      commands(2); Systolic BP: > 89 mm Hg(4); Respiratory Rate: 10 to 29 per min(4); [place]     

      Score: 15; Trauma Score: 12                                                                 

13:00 Eye Response: spontaneous(1); Verbal Response: oriented(1); Motor Response: obeys       hb  

      commands(2); Systolic BP: > 89 mm Hg(4); Respiratory Rate: 10 to 29 per min(4); [place]     

      Score: 15; Trauma Score: 12                                                                 

14:00 Eye Response: spontaneous(1); Verbal Response: oriented(1); Motor Response: obeys       hb  

      commands(2); Systolic BP: > 89 mm Hg(4); Respiratory Rate: 10 to 29 per min(4); [place]     

      Score: 15; Trauma Score: 12                                                                 

                                                                                                  

ED Course:                                                                                        

06:25 Patient arrived in ED.                                                                  bb  

06:25 Keke Cali MD is Attending Physician.                                           ma2 

06:26 Patient has correct armband on for positive identification. Bed in low position. Call   bb  

      light in reach. Side rails up X 1. Patient maintains SpO2 saturation greater than 95%       

      on room air.                                                                                

06:26 Patient maintains SpO2 saturation greater than 95% on room air.                         bb  

06:27 Triage completed.                                                                       bb  

06:30 Inserted saline lock: 20 gauge in right wrist, using aseptic technique.                 [id number] 

06:36 Arm band placed on.                                                                     bb  

06:36 Thermoregulation: warm blanket given to patient.                                        bb  

07:07 Attending Physician role handed off by Keke Cali MD                            kdr 

07:07 Mike May MD is Attending Physician.                                              kdr 

07:19 XRAY Chest (1 view) In Process Unspecified.                                             EDMS

07:38 Facial Bones W/O Con CT In Process Unspecified.                                         EDMS

07:39 CT Head C Spine In Process Unspecified.                                                 EDMS

08:25 Shoulder Right (2 View) XRAY In Process Unspecified.                                    EDMS

08:25 Humerus Right XRAY In Process Unspecified.                                              EDMS

08:25 Elbow Right 2 View In Process Unspecified.                                              EDMS

08:31 Fiordaliza Dunbar RN is Primary Nurse.                                                   hb  

09:44 Chapincito Maxwell is Hospitalizing Provider.                                               kdr 

10:13 Inserted saline lock: 22 gauge in left antecubital area, using aseptic technique. Blood ss  

      collected.                                                                                  

14:00 No provider procedures requiring assistance completed. Patient admitted, IV remains in  hb  

      place.                                                                                      

19:35 Primary Nurse role handed off by Fiordaliza Dunbar RN                                    [id number] 

                                                                                                  

Administered Medications:                                                                         

07:00 Drug: Ketorolac 30 mg Route: IVP; Site: left hand;                                      bb  

07:11 Follow up: Response: No adverse reaction                                                bb  

07:00 Drug: NS 0.9% 1000 ml Route: IV; Rate: 1 bolus; Site: left hand;                        bb  

07:00 Drug: Tetanus-Diphtheria Toxoid Adult 0.5 ml {: [employer] [facility]. Exp:         bb  

      2023. Lot #: [id number]. } Route: IM; Site: left deltoid;                                  

07:12 Follow up: Response: No adverse reaction                                                bb  

08:29 Drug: morphine 2 mg Route: IVP; Site: left wrist;                                       hb  

08:29 Drug: Zofran (Ondansetron) 4 mg Route: IVP; Site: left wrist;                           hb  

09:10 Follow up: Response: No adverse reaction                                                hb  

10:29 Drug: morphine 4 mg Route: IVP; Site: left antecubital;                                 hb  

13:40 Drug: morphine 2 mg Route: IVP; Site: left antecubital;                                 hb  

                                                                                                  

                                                                                                  

Intake:                                                                                           

06:26 PO: 0ml; Total: 0ml.                                                                    bb  

                                                                                                  

Outcome:                                                                                          

09:47 Decision to Hospitalize by Provider.                                                    kdr 

14:00 Admitted to ER Hold.  Please see [facility] for further documentation.                    hb  

14:00 Condition: stable                                                                           

14:00 Instructed on the need for admit, Demonstrated understanding of instructions.               

14:00 Patient's length of stay in the Emergency Department was greater than 2 hours.          hb  

19:36 Patient left the ED.                                                                    michelle  

                                                                                                  

Signatures:                                                                                       

Dispatcher [employer]                           EDMS                                                 

Mike May MD MD kdr Ballard, Brenda, RN RN   bb                                                   

Francesca Hoyt RN RN ss Baxter, Heather, RN RN                                                      

Steve Beach RN RN [id number] Antunez, Elena, RN RN ea Alzahri, Mohammad, MD MD ma2                                                  

Jenise Arrington                            2                                                  

                                                                                                  

**************************************************************************************************

## 2021-06-30 NOTE — RAD REPORT
EXAM DESCRIPTION:  Lyssa Single View6/30/2021 7:20 am

 

CLINICAL HISTORY:  Chest pain

 

COMPARISON:  2009

 

FINDINGS:   Mild bilateral pulmonary opacities. Heart is mildly enlarged. Calcified bilateral breast 
implants.

 

Mildly to moderately displaced right humeral neck fracture

 

IMPRESSION:  Mild bilateral pulmonary opacities may represent interstitial pulmonary edema

 

Mildly to moderately displaced right humeral neck fracture

## 2021-07-01 LAB
BUN BLD-MCNC: 25 MG/DL (ref 7–18)
GLUCOSE SERPLBLD-MCNC: 76 MG/DL (ref 74–106)
HCT VFR BLD CALC: 35.3 % (ref 36–45)
LYMPHOCYTES # SPEC AUTO: 0.7 K/UL (ref 0.7–4.9)
MAGNESIUM SERPL-MCNC: 1.6 MG/DL (ref 1.8–2.4)
PMV BLD: 8.6 FL (ref 7.6–11.3)
POTASSIUM SERPL-SCNC: 5.4 MMOL/L (ref 3.5–5.1)
RBC # BLD: 3.83 M/UL (ref 3.86–4.86)

## 2021-07-01 RX ADMIN — LEVOTHYROXINE SODIUM SCH: 25 TABLET ORAL at 06:30

## 2021-07-01 RX ADMIN — POTASSIUM & SODIUM PHOSPHATES POWDER PACK 280-160-250 MG SCH: 280-160-250 PACK at 10:00

## 2021-07-01 RX ADMIN — POTASSIUM & SODIUM PHOSPHATES POWDER PACK 280-160-250 MG SCH: 280-160-250 PACK at 08:00

## 2021-07-01 RX ADMIN — HEPARIN SODIUM SCH: 5000 INJECTION, SOLUTION INTRAVENOUS; SUBCUTANEOUS at 16:39

## 2021-07-01 RX ADMIN — FENTANYL CITRATE PRN MCG: 50 INJECTION, SOLUTION INTRAMUSCULAR; INTRAVENOUS at 00:26

## 2021-07-01 RX ADMIN — POTASSIUM & SODIUM PHOSPHATES POWDER PACK 280-160-250 MG SCH: 280-160-250 PACK at 09:00

## 2021-07-01 RX ADMIN — Medication SCH ML: at 09:00

## 2021-07-01 RX ADMIN — HEPARIN SODIUM SCH UNIT: 5000 INJECTION, SOLUTION INTRAVENOUS; SUBCUTANEOUS at 00:16

## 2021-07-01 RX ADMIN — FENTANYL CITRATE PRN MCG: 50 INJECTION, SOLUTION INTRAMUSCULAR; INTRAVENOUS at 20:54

## 2021-07-01 RX ADMIN — AMITRIPTYLINE HYDROCHLORIDE SCH MG: 50 TABLET, FILM COATED ORAL at 20:37

## 2021-07-01 RX ADMIN — Medication SCH ML: at 19:42

## 2021-07-01 RX ADMIN — HEPARIN SODIUM SCH UNIT: 5000 INJECTION, SOLUTION INTRAVENOUS; SUBCUTANEOUS at 10:03

## 2021-07-01 RX ADMIN — SODIUM CHLORIDE SCH: 0.9 INJECTION, SOLUTION INTRAVENOUS at 04:34

## 2021-07-01 NOTE — P.PN
Subjective


Date of Service: 07/01/21


Chief Complaint: Fall with humeral fracture


Patient complaining of a lot of pain which has flinching.








Physical Examination





- Vital Signs


Temperature: 98.1 F


Blood Pressure: 141/65


Pulse: 72


Respirations: 19


Pulse Ox (%): 93





- Physical Exam


General: Alert, In no apparent distress


HEENT: Mucous membr. moist/pink


Neck: JVD not distended


Respiratory: Clear to auscultation bilaterally, Normal air movement


Cardiovascular: No edema, Regular rate/rhythm, Normal S1 S2


Gastrointestinal: Soft and benign, Non-distended, No tenderness


Musculoskeletal: No swelling, Tenderness (Right arm)


Integumentary: Other (Bruised rigt )


Neurological: Normal strength at 5/5 x4 extr, Cranial nerves 3-12 intact





Assessment And Plan





- Current Problems (Diagnosis)


(1) Fall


Current Visit: Yes   Status: Acute   





(2) Humeral fracture


Current Visit: Yes   Status: Acute   





(3) Rheumatoid arthritis


Current Visit: Yes   Status: Acute   





(4) Acute renal failure


Current Visit: Yes   Status: Acute   





- Plan


Continue Pain management with IV fentanyl.  Patient states she does not tolerate

Norco. Norco changed to oxycodone.


Gabapentin.


Renal function is improving slowly.


Continue IV normal saline.


Nephrology consult.


Monitor renal function.


Sling for right humeral fracture.


Patient reported to be drowsy after oxycodone and Phenergan.


PT to eval low when she is more awake

## 2021-07-02 LAB
BUN BLD-MCNC: 22 MG/DL (ref 7–18)
GLUCOSE SERPLBLD-MCNC: 83 MG/DL (ref 74–106)
MAGNESIUM SERPL-MCNC: 2 MG/DL (ref 1.8–2.4)
POTASSIUM SERPL-SCNC: 5.3 MMOL/L (ref 3.5–5.1)

## 2021-07-02 RX ADMIN — SULFASALAZINE SCH MG: 500 TABLET, DELAYED RELEASE ORAL at 09:30

## 2021-07-02 RX ADMIN — AMITRIPTYLINE HYDROCHLORIDE SCH MG: 50 TABLET, FILM COATED ORAL at 21:03

## 2021-07-02 RX ADMIN — LEVOTHYROXINE SODIUM SCH MG: 25 TABLET ORAL at 05:59

## 2021-07-02 RX ADMIN — HEPARIN SODIUM SCH UNIT: 5000 INJECTION, SOLUTION INTRAVENOUS; SUBCUTANEOUS at 09:30

## 2021-07-02 RX ADMIN — Medication SCH ML: at 21:00

## 2021-07-02 RX ADMIN — HEPARIN SODIUM SCH UNIT: 5000 INJECTION, SOLUTION INTRAVENOUS; SUBCUTANEOUS at 17:19

## 2021-07-02 RX ADMIN — AMITRIPTYLINE HYDROCHLORIDE SCH MG: 50 TABLET, FILM COATED ORAL at 09:30

## 2021-07-02 RX ADMIN — Medication SCH ML: at 09:00

## 2021-07-02 RX ADMIN — HEPARIN SODIUM SCH UNIT: 5000 INJECTION, SOLUTION INTRAVENOUS; SUBCUTANEOUS at 17:09

## 2021-07-02 RX ADMIN — HEPARIN SODIUM SCH UNIT: 5000 INJECTION, SOLUTION INTRAVENOUS; SUBCUTANEOUS at 01:25

## 2021-07-02 NOTE — RAD REPORT
EXAM DESCRIPTION:  CT - Head Brain Wo Cont - 7/2/2021 4:34 pm

 

CLINICAL HISTORY:  Alteration of awareness/confusion

 

COMPARISON:  06/30/2021 2014

 

TECHNIQUE:  Computed axial tomography of the head was obtained. IV contrast was not requested.

 

All CT scans are performed using dose optimization technique as appropriate and may include automated
 exposure control or mA/KV adjustment according to patient size.

 

FINDINGS:  An intracranial  bleed is not seen .

 

The ventricles are normal in caliber.

 

No extra-axial fluid collection is noted. 14 millimeter cystic structure in the right aspect of the p
ineal region is unchanged and may represent a cyst or diverticulum

 

Mild to moderate low-density areas within periventricular, deep and subcortical white matter likely r
epresent ischemic changes secondary to small vessel disease.

 

Fluid within the sinuses/ mastoids is not seen.

 

IMPRESSION:  No acute intracranial abnormality is seen. If patient's symptoms persist  MRI of the bra
in would be recommended.

## 2021-07-02 NOTE — EKG
Test Date:    2021-06-30               Test Time:    06:52:12

Technician:   SARITHA                                    

                                                     

MEASUREMENT RESULTS:                                       

Intervals:                                           

Rate:         63                                     

GA:           234                                    

QRSD:         94                                     

QT:           448                                    

QTc:          458                                    

Axis:                                                

P:            57                                     

GA:           234                                    

QRS:          -35                                    

T:            57                                     

                                                     

INTERPRETIVE STATEMENTS:                                       

                                                     

Sinus rhythm with 1st degree AV block

Possible Left atrial enlargement

Left axis deviation

Left ventricular hypertrophy

Abnormal ECG

Compared to ECG 06/12/2009 19:53:41

First degree AV block now present

Left-axis deviation now present

Left ventricular hypertrophy now present

Prolonged QT interval no longer present



Electronically Signed On 07-02-21 06:18:28 CDT by Wicho Arora

## 2021-07-02 NOTE — RAD REPORT
EXAM DESCRIPTION:  RAD - Hip Right 2 View - 7/2/2021 11:07 am

 

CLINICAL HISTORY:  Right inguinal pain s/p fall

Trauma, right inguinal pain

 

COMPARISON:  Hip Right 2 View dated 7/30/2019

 

FINDINGS:  No acute fracture or dislocation evident.

## 2021-07-02 NOTE — P.CNS
Date of Consult: 07/02/21


Reason for Consult: SMITH


Requesting Physician: ana zamora


Chief Complaint: Fall with humeral fracture


History of Present Illness: 





71-year-old woman with a history of rheumatoid arthritis, hypothyroidism was 

brought to the emergency department after a fall at home.  Patient stated she 

fell when got up from her recliner in the middle of the night to go and sleep on

her bed.  She thought she might have turned around quickly.  She hit the right 

side of her head and sustained laceration temple.  She could not get up and 

walk.  She developed pain.   was woken by their dogs a few hours later 

and found her lying in a pool of blood.  According to the  patient 

responding to him appropriately, no loss of consciousness but she was in pain.  

She was brought to the emergency department where x-rays showing minimal to mod

erately displaced right femoral fracture, no dislocation.  Patient needing a lot

of pain medications for pain control.  The ED physician wishes to place patient 

under observation for pain control.





06:49 This 71 yrs old  Female presents to ER via EMS with complaints of

Fall Injury. ma2 


06:49 Details of fall: The patient fell from seated position, out of a chair. 

Onset: The      ma2 


      symptoms/episode began/occurred acutely, gradually, 1 hour(s) ago. 

Severity of              


      symptoms: At their worst the symptoms were mild, in the emergency 

department the            


      symptoms are unchanged. The patient has experienced similar episodes in 

the past.           


06:51 lost balance, fell hit her head and right elbow and shoulder .            

             


Allergies





codeine Allergy (Verified 06/30/21 20:25)


   throwing up


Sulfa (Sulfonamide Antibiotics) Allergy (Verified 06/30/21 20:25)


   Itching/Hives/Rash





Home medications list reviewed: Yes


Home Medications: 








ALPRAZolam [Alprazolam] 0.25 mg PO DAILY 07/01/21 


Amitriptyline [Elavil*] 50 mg PO BID 07/01/21 


Levothyroxine Sodium [Levothyroxine] 25 mcg PO DAILY 07/01/21 


sulfaSALAzine [Sulfasalazine] 500 mg PO DAILY 07/01/21 








- Past Medical/Surgical History


Diabetic: No


-: Rheumatoid arthritis


-: Raynaud's disease


-: Hypothyroidism


-: Hysterectomy


-: Rectal reconstruction surgery





- Family History


  ** Father


Medical History: Cancer





  ** Brother


Medical History: Cancer





- Social History


Smoking Status: Unknown if ever smoked


Alcohol use: No


CD- Drugs: No


Place of Residence: Home





Review of Systems


10-point ROS is otherwise unremarkable


General: Weakness, Malaise


Integumentary: Bruising


Neurological: Weakness





Physical Examination














Temp Pulse Resp BP Pulse Ox


 


 98.0 F   72   16   179/75 H  94 


 


 07/02/21 12:00  07/02/21 12:00  07/02/21 12:00  07/02/21 12:00  07/02/21 12:00








General: Cooperative


Neck: Supple


Respiratory: Clear to auscultation bilaterally


Cardiovascular: No edema, Regular rate/rhythm


Gastrointestinal: Soft and benign, Non-distended


Musculoskeletal: No clubbing, No contractures


Integumentary: No rashes, No cyanosis


Neurological: Normal speech


Blood work reviewed in the chart.


Imagings Data: 


EXAM DESCRIPTION:  [place] Single View6/30/2021 7:20 am


CLINICAL HISTORY:  Chest pain


COMPARISON:  2009


FINDINGS:   Mild bilateral pulmonary opacities. Heart is mildly enlarged. 

Calcified bilateral breast implants.


Mildly to moderately displaced right humeral neck fracture


IMPRESSION:  Mild bilateral pulmonary opacities may represent interstitial 

pulmonary edema


Mildly to moderately displaced right humeral neck fracture


Conclusions/Impression: 


SMITH likely hypovolemia


CKD III


-No NSAIDs





Hyperkalemia


-Kayexalate 15g X1





Hypomagnesemia


-Replete IV mag prn





HypoPO4


-Neutraphos PRN


-Encourage nutrition





HTN


-Consider amlodipine





Anemia in chronic illness


-Monitor H&H





Thank you kindly for the consultation.


Case reviewed with Dr. Zamora

## 2021-07-02 NOTE — P.PN
Subjective


Date of Service: 07/02/21


Chief Complaint: Fall with humeral fracture


Patient is very confused this afternoon.


She is disoriented.


She worked well with physical therapy this morning and was able to ambulate with

a walker.


She was complaining of pain in the right groin.  X-ray of the hip shows no acute

fracture or dislocation.








Physical Examination





- Vital Signs


Temperature: 98.0 F


Blood Pressure: 179/75


Pulse: 72


Respirations: 16


Pulse Ox (%): 94





- Physical Exam


General: In no apparent distress, Confused


HEENT: PERRLA, Mucous membr. moist/pink, Sclerae nonicteric


Neck: JVD not distended


Respiratory: Clear to auscultation bilaterally, Normal air movement


Cardiovascular: No edema, Regular rate/rhythm, Normal S1 S2


Gastrointestinal: Normal bowel sounds, Soft and benign, Non-distended, No 

tenderness


Musculoskeletal: Swelling (Right knee), Other (Splinted right upper extremity)


Integumentary: Other (Bruise at right temple.)


Neurological: Normal speech, Other (No focal motor deficit)





Assessment And Plan





- Current Problems (Diagnosis)


(1) Fall


Current Visit: Yes   Status: Acute   





(2) Humeral fracture


Current Visit: Yes   Status: Acute   





(3) Rheumatoid arthritis


Current Visit: Yes   Status: Acute   





(4) Acute renal failure


Current Visit: Yes   Status: Acute   





(5) Altered mental status


Current Visit: Yes   Status: Acute   





- Plan


Altered mental status.  Rule out acute CVA versus intracranial bleed from head 

injury.


Obtain head CT.


Neuro checks.


Limit opioid for pain.  Oxycodone p.r.n.


No Gabapentin until mental status clears.


Renal function is improving slowly.


Continue IV normal saline.


Nephrology to see.


Monitor renal function.


Sling for right humeral fracture.


Continue PT.


Check UA to assess for UTI.

## 2021-07-03 LAB
ANISOCYTOSIS BLD QL: (no result)
BLD SMEAR INTERP: (no result)
BUN BLD-MCNC: 30 MG/DL (ref 7–18)
GLUCOSE SERPLBLD-MCNC: 57 MG/DL (ref 74–106)
HCT VFR BLD CALC: 35 % (ref 36–45)
LYMPHOCYTES # SPEC AUTO: 1.1 K/UL (ref 0.7–4.9)
MORPHOLOGY BLD-IMP: (no result)
PMV BLD: 9.5 FL (ref 7.6–11.3)
POTASSIUM SERPL-SCNC: 5.5 MMOL/L (ref 3.5–5.1)
RBC # BLD: 3.79 M/UL (ref 3.86–4.86)

## 2021-07-03 RX ADMIN — SULFASALAZINE SCH MG: 500 TABLET, DELAYED RELEASE ORAL at 08:49

## 2021-07-03 RX ADMIN — HEPARIN SODIUM SCH: 5000 INJECTION, SOLUTION INTRAVENOUS; SUBCUTANEOUS at 15:44

## 2021-07-03 RX ADMIN — TRAMADOL HYDROCHLORIDE PRN MG: 50 TABLET, COATED ORAL at 22:48

## 2021-07-03 RX ADMIN — HEPARIN SODIUM SCH: 5000 INJECTION, SOLUTION INTRAVENOUS; SUBCUTANEOUS at 08:49

## 2021-07-03 RX ADMIN — AMITRIPTYLINE HYDROCHLORIDE SCH MG: 50 TABLET, FILM COATED ORAL at 08:49

## 2021-07-03 RX ADMIN — Medication SCH ML: at 08:49

## 2021-07-03 RX ADMIN — LEVOTHYROXINE SODIUM SCH MG: 25 TABLET ORAL at 06:27

## 2021-07-03 RX ADMIN — HEPARIN SODIUM SCH UNIT: 5000 INJECTION, SOLUTION INTRAVENOUS; SUBCUTANEOUS at 01:36

## 2021-07-03 RX ADMIN — AMITRIPTYLINE HYDROCHLORIDE SCH MG: 50 TABLET, FILM COATED ORAL at 20:35

## 2021-07-03 RX ADMIN — Medication SCH ML: at 20:35

## 2021-07-03 RX ADMIN — TRAMADOL HYDROCHLORIDE PRN MG: 50 TABLET, COATED ORAL at 14:13

## 2021-07-03 NOTE — P.PN
Subjective


Date of Service: 07/03/21


Chief Complaint: Fall with humeral fracture


Patient is more awake and alert today


Her disorientation yesterday could be medication-related.


Repeat CT head was unremarkable


Patient is needing assistance with transfer.











Physical Examination





- Vital Signs


Temperature: 97.5 F


Blood Pressure: 109/62


Pulse: 74


Respirations: 16


Pulse Ox (%): 97





- Physical Exam


General: Alert, In no apparent distress, Oriented x3


HEENT: Mucous membr. moist/pink


Neck: JVD not distended


Respiratory: Clear to auscultation bilaterally, Normal air movement


Cardiovascular: No edema, Regular rate/rhythm, Normal S1 S2, Systolic murmur


Gastrointestinal: Normal bowel sounds, Soft and benign, Non-distended, No 

tenderness


Musculoskeletal: Tenderness (Right shoulder)


Integumentary: Other (Right temple bruise is better.)


Neurological: Normal speech, Other (No focal motor deficit.)





Assessment And Plan





- Current Problems (Diagnosis)


(1) Fall


Current Visit: Yes   Status: Acute   





(2) Humeral fracture


Current Visit: Yes   Status: Acute   





(3) Rheumatoid arthritis


Current Visit: Yes   Status: Acute   





(4) Acute renal failure


Current Visit: Yes   Status: Acute   





(5) Altered mental status


Current Visit: Yes   Status: Acute   





(6) Metabolic encephalopathy


Current Visit: Yes   Status: Acute   





- Plan


Altered mental status.  Head CT unremarkable.


Altered mental status likely medication related.  UA is negative for UTI.  Not 

septic.


Neuro checks.


Patient very sensitive to psychotropic medications


Limit opioid for pain.  Trial of Tramadol


No Gabapentin until mental status clears.


Renal function is improving slowly.


Kayexalate for hyperkalemia


Continue IV normal saline.


Nephrology input appreciated.


Monitor renal function.


Sling for right humeral fracture.


Continue PT.


Patient hypoglycemia this morning.


Encourage oral intake.

## 2021-07-04 VITALS — DIASTOLIC BLOOD PRESSURE: 65 MMHG | SYSTOLIC BLOOD PRESSURE: 118 MMHG | TEMPERATURE: 97.4 F

## 2021-07-04 VITALS — OXYGEN SATURATION: 93 %

## 2021-07-04 LAB
ALBUMIN SERPL BCP-MCNC: 2.8 G/DL (ref 3.4–5)
BUN BLD-MCNC: 41 MG/DL (ref 7–18)
GLUCOSE SERPLBLD-MCNC: 80 MG/DL (ref 74–106)
HCT VFR BLD CALC: 34.2 % (ref 36–45)
LYMPHOCYTES # SPEC AUTO: 1.2 K/UL (ref 0.7–4.9)
PMV BLD: 9.4 FL (ref 7.6–11.3)
POTASSIUM SERPL-SCNC: 4.7 MMOL/L (ref 3.5–5.1)
RBC # BLD: 3.71 M/UL (ref 3.86–4.86)

## 2021-07-04 RX ADMIN — HEPARIN SODIUM SCH UNIT: 5000 INJECTION, SOLUTION INTRAVENOUS; SUBCUTANEOUS at 02:13

## 2021-07-04 RX ADMIN — LEVOTHYROXINE SODIUM SCH MG: 25 TABLET ORAL at 05:36

## 2021-07-04 RX ADMIN — SULFASALAZINE SCH MG: 500 TABLET, DELAYED RELEASE ORAL at 08:09

## 2021-07-04 RX ADMIN — Medication SCH ML: at 08:09

## 2021-07-04 RX ADMIN — AMITRIPTYLINE HYDROCHLORIDE SCH MG: 50 TABLET, FILM COATED ORAL at 08:09

## 2021-07-04 RX ADMIN — HEPARIN SODIUM SCH UNIT: 5000 INJECTION, SOLUTION INTRAVENOUS; SUBCUTANEOUS at 08:10

## 2021-07-04 NOTE — P.DS
Admission Date: 07/02/21


Discharge Date: 07/04/21


Disposition: [facility]/HOME HEALTH CARE


Discharge Condition: FAIR


Reason for Admission: Fall with humeral fracture





- Problems


(1) Fall


Current Visit: Yes   Status: Acute   





(2) Humeral fracture


Current Visit: Yes   Status: Acute   





(3) Rheumatoid arthritis


Current Visit: Yes   Status: Acute   





(4) Acute renal failure


Current Visit: Yes   Status: Acute   





(5) Altered mental status


Current Visit: Yes   Status: Acute   





(6) Metabolic encephalopathy


Current Visit: Yes   Status: Acute   


Brief History of Present Illness: 


[age]-year-old woman with a history of rheumatoid arthritis, hypothyroidism was 

brought to the emergency department after a fall at home.  Patient stated she 

fell when got up from her recliner in the middle of the night to go and sleep on

her bed.  She thought she might have turned around quickly.  She hit the right 

side of her head and sustained laceration temple.  She could not get up and 

walk.  She developed pain.   was woken by their dogs a few hours later 

and found her lying in a pool of blood.  According to the  patient 

responding to him appropriately, no loss of consciousness but she was in pain.  

She was brought to the emergency department where x-rays showing minimal to 

moderately displaced right femoral fracture, no dislocation.  Patient needing a 

lot of pain medications for pain control.  Patient admitted for further 

management.





Hospital Course: 


Patient admitted to the medical floor, initially put on fentanyl and oxycodone 

for pain management.  Patient developed nausea and got confused.  She did not 

tolerate opioids.  Pain medication changed to tramadol which she tolerated.  

Repeat CT head was done which was negative for acute bleed or infarct.  Patient 

seen by nephrology for acute renal failure.  She also developed hyperkalemia.  

Patient seen by nephrology will hydrate with IV hydration.  Her creatinine was 

relatively stable.  Hyperkalemia was treated with oral Kayexalate.  Patient pain

and significantly improved.  She was initially needing total assistance with 

transfer but her functional status has improved and now able to transfer with 1 

person assist.  She also ambulated several feet in the hallway with a walker.  

Orthopedic surgeon recommended keeping her right upper extremity in a sling, no 

surgical intervention.  Patient has stabilized, confusion has resolved, and she 

is discharged with home health for physical therapy.  She will follow with 

nephrology as an outpatient regarding her chronic kidney disease.





Vital Signs/Physical Exam: 














Temp Pulse Resp BP Pulse Ox


 


 97.4 F   70   16   118/65   92 


 


 07/04/21 12:00  07/04/21 12:00  07/04/21 12:00  07/04/21 12:00  07/04/21 12:00








General: Alert, In no apparent distress, Oriented x3


HEENT: PERRLA, Mucous membr. moist/pink, Other (Right temporal bruise.)


Neck: JVD not distended


Respiratory: Clear to auscultation bilaterally, Normal air movement


Cardiovascular: Regular rate/rhythm, Normal S1 S2


Gastrointestinal: Normal bowel sounds, Soft and benign, Non-distended, No 

tenderness


Musculoskeletal: No erythema


Integumentary: No rashes


Neurological: Normal strength at 5/5 x4 extr


Laboratory Data at Discharge: 














WBC  5.80 K/uL (4.3-10.9)   07/04/21  05:37    


 


Hgb  10.8 g/dL (12.0-15.0)  L  07/04/21  05:37    


 


Hct  34.2 % (36.0-45.0)  L  07/04/21  05:37    


 


Plt Count  106 K/uL (152-406)  L  07/04/21  05:37    


 


PT  12.7 SECONDS (9.5-12.5)  H  06/30/21  06:46    


 


INR  1.10   06/30/21  06:46    


 


Sodium  139 mmol/L (136-145)   07/04/21  05:37    


 


Potassium  4.7 mmol/L (3.5-5.1)   07/04/21  05:37    


 


BUN  41 mg/dL (7-18)  H  07/04/21  05:37    


 


Creatinine  2.14 mg/dL (0.55-1.3)  H  07/04/21  05:37    


 


Glucose  80 mg/dL ()   07/04/21  05:37    


 


Phosphorus  3.7 mg/dL (2.5-4.9)   07/04/21  05:37    


 


Magnesium  2.0 mg/dL (1.8-2.4)   07/02/21  05:24    


 


Total Bilirubin  0.3 mg/dL (0.2-1.0)   06/30/21  06:46    


 


AST  17 U/L (15-37)   06/30/21  06:46    


 


ALT  17 U/L (12-78)   06/30/21  06:46    


 


Alkaline Phosphatase  88 U/L ()   06/30/21  06:46    








Home Medications: 








ALPRAZolam [Alprazolam] 0.25 mg PO DAILY 07/01/21 


Amitriptyline [Elavil*] 50 mg PO BID 07/01/21 


Levothyroxine Sodium [Levothyroxine] 25 mcg PO DAILY 07/01/21 


sulfaSALAzine [Sulfasalazine] 500 mg PO DAILY 07/01/21 


traMADol HCL [Ultram*] 50 mg PO Q6H PRN #20 tab 07/04/21 





New Medications: 


traMADol HCL [Ultram*] 50 mg PO Q6H PRN #20 tab


 PRN Reason: Pain Scale 5-7 (Moderate)


Physician Discharge Instructions: 


Please you are dvised to stay hydrated by drinking a lot of water, at least 6-8 

glasses of water over the next few days.


Diet: AHA


Activity: Fall precautions


Followup: 


NONE,NONE [Primary Care Provider] - 


Vito Romero DO [ACTIVE - CAN ADMIT] - 1 Week


Time spent managing pt's care (in minutes): 33

## 2021-07-04 NOTE — CON
Date of Consultation:  07/04/2021



Subjective:  The patient is seen at bedside.  The patient did require 2 doses of Kayexalate yesterday
 that had good effect.  Potassium has improved.  The patient states her p.o. intake remains poor.  Shy pope only ate a little bit less than half her meal today.  She denies any fevers, chills, chest pain, sh
ortness of breath, nausea, vomiting, or diarrhea.  Her arm pain is controlled with analgesics.



Objective:  Vital Signs:  Blood pressure is 111/58, pulse 62, afebrile.  

General:  No acute distress. 

Heart:  Regular rate and rhythm.  No murmurs, rubs, gallops. 

Lungs:  Grossly clear.  

Abdomen:  Soft, nontender, nondistended. 

Extremities:  With no significant edema.



Laboratory Data:  Sodium 139, potassium 4.7, chloride 111, CO2 24, BUN and creatinine 41/2.14, calciu
m 9.4, phosphorus 3.7, albumin 2.8.



Current Medications:  Reviewed.



Impression:  

1.Acute kidney injury on suspected underlying chronic kidney disease, stage 3B/4 secondary to acute 
tubular necrosis, volume depletion, as well as recent use of NSAIDs including Naprosyn and Toradol.

2.Recent fall.

3.Humeral fracture.

4.Hyperkalemia.

5.Anemia.

6.History of rheumatoid arthritis. 

Plan:  Ms. Maurice's renal function is relatively stable.  I believe that some of the fluctuations had 
been seen after the acute insult secondary to poor p.o. intake at this time.  The patient does tell m
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 
 I have also ordered a bladder scan to assess for any retention.  There is over 250 mL of urine in 
e bladder.  We would recommend Phillips placement. 



We will start patient on gentle IV fluids as well as a repeat chest x-ray as the patient's exam was s
omewhat difficult with poor effort.  Monitor analgesia and mental status closely.





SE/MODL

DD:  07/04/2021 11:49:34Voice ID:  [id number]

DT:  07/04/2021 12:29:32Report ID:  [id number]

## 2021-07-07 ENCOUNTER — HOSPITAL ENCOUNTER (EMERGENCY)
Dept: HOSPITAL 97 - ER | Age: 71
Discharge: HOME | End: 2021-07-07
Payer: COMMERCIAL

## 2021-07-07 VITALS — SYSTOLIC BLOOD PRESSURE: 98 MMHG | DIASTOLIC BLOOD PRESSURE: 72 MMHG

## 2021-07-07 VITALS — OXYGEN SATURATION: 94 %

## 2021-07-07 VITALS — TEMPERATURE: 98.6 F

## 2021-07-07 DIAGNOSIS — S09.90XA: Primary | ICD-10-CM

## 2021-07-07 DIAGNOSIS — J44.9: ICD-10-CM

## 2021-07-07 DIAGNOSIS — E03.9: ICD-10-CM

## 2021-07-07 DIAGNOSIS — Z88.5: ICD-10-CM
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
## 2021-07-07 DIAGNOSIS — Z88.2: ICD-10-CM

## 2021-07-07 DIAGNOSIS — S90.32XA: ICD-10-CM

## 2021-07-07 PROCEDURE — 73630 X-RAY EXAM OF FOOT: CPT

## 2021-07-07 PROCEDURE — 70450 CT HEAD/BRAIN W/O DYE: CPT

## 2021-07-07 PROCEDURE — 99284 EMERGENCY DEPT VISIT MOD MDM: CPT

## 2021-07-07 PROCEDURE — 96374 THER/PROPH/DIAG INJ IV PUSH: CPT

## 2021-07-07 PROCEDURE — 96375 TX/PRO/DX INJ NEW DRUG ADDON: CPT

## 2021-07-07 PROCEDURE — 72125 CT NECK SPINE W/O DYE: CPT

## 2021-07-07 NOTE — RAD REPORT
EXAM DESCRIPTION:  RAD - Foot Left 3 View - 7/7/2021 6:48 am

 

CLINICAL HISTORY:  Fall, foot pain

 

COMPARISON:  None.

 

FINDINGS:  Advanced degenerative changes with complete loss of joint space at the first MTP joint. No
 fracture or malalignment identified. No radiopaque foreign body.

 

IMPRESSION:  No left foot fracture identified.

## 2021-07-07 NOTE — ER
Nurse's Notes                                                                                     

 [facility]                                                                 

Name: Yarelis Maurice                                                                                

Age: 71 yrs                                                                                       

Sex: Female                                                                                       

: 1950                                                                                   

MRN: [id number]                                                                                   

Arrival Date: 2021                                                                          

Time: 04:20                                                                                       

Account#: [id number]                                                                            

Bed 3                                                                                             

Private MD:                                                                                       

Diagnosis: Unspecified injury of head, initial encounter;Contusion of left foot                   

                                                                                                  

Presentation:                                                                                     

                                                                                             

05:20 Chief complaint: Patient states: She was recently here 1 week for a fall, released on   jb4 

      the  and fell again yesterday at about 1pm. She is still struggling with the pain of     

      the first fall and the pain of the current. Coronavirus screen: Client denies travel        

      out of the [place] in the last 14 days. At this time, the client does not indicate any         

      symptoms associated with coronavirus-19. Ebola Screen: No symptoms or risks identified      

      at this time. Initial Sepsis Screen: Does the patient meet any 2 criteria? RR > 20 per      

      min. Yes Does the patient have a suspected source of infection? No. Patient's initial       

      sepsis screen is negative. Risk Assessment: Do you want to hurt yourself or someone         

      else? Patient reports no desire to harm self or others. Onset of symptoms was 2021. Transition of care: patient was not received from another setting of care.            

05:20 Method Of Arrival: Wheelchair                                                           jb4 

05:20 Acuity: VLAERIE 3                                                                           jb4 

                                                                                                  

Historical:                                                                                       

- Allergies:                                                                                      

05:26 Codeine;                                                                                jb4 

05:26 Sulfa;                                                                                  jb4 

- Home Meds:                                                                                      

05:26 alendronate 70 mg Oral tab once wkly [Active]; alprazolam 0.25 mg Oral tab 1 tab daily  jb4 

      [Active]; amitriptyline 50 mg Oral tab 2 times per day [Active]; levothyroxine 25 mcg       

      tab [Active]; omeprazole 20 mg Oral cpDR 1 cap 2 times per day [Active]; sulfasalazine      

      500 mg Oral tab 2 tab after meals [Active]; VItamin D \T\ E [Active];                       

- PMHx:                                                                                           

05:26 born with 1 kidney; COPD; Hypothyroidism; Osteoporosis; Raynaud's Disease; Rheumatoid   jb4 

      Arthritis; Scleroderma;                                                                     

- PSHx:                                                                                           

05:26 hysterectomy;                                                                           jb4 

                                                                                                  

- Immunization history:: Adult Immunizations up to date.                                          

- Social history:: Smoking status: Patient denies any tobacco usage or history of.                

  Patient/guardian denies using alcohol, street drugs.                                            

                                                                                                  

                                                                                                  

Screenin:39 Abuse screen: Denies threats or abuse. Nutritional screening: No deficits noted.        ea  

      Tuberculosis screening: No symptoms or risk factors identified. Fall Risk None              

      identified.                                                                                 

                                                                                                  

Assessment:                                                                                       

05:37 General: Appears uncomfortable, Behavior is appropriate for age. Pain: Complains of     ea  

      pain in right arm. Neuro: Level of Consciousness is awake, alert, obeys commands,           

      Oriented to person, place, time. Respiratory: Airway is patent Respiratory effort is        

      even, Respiratory pattern is tachypnea. Derm: Skin is dry, Skin is pale, Skin               

      temperature is warm.                                                                        

06:57 Reassessment: Patient and/or family updated on plan of care and expected duration. Pain ea  

      level reassessed. Patient is alert, oriented x 3, equal unlabored respirations, skin        

      warm/dry/pink. Pt taken to CT.                                                              

07:28 Reassessment: Patient and/or family updated on plan of care and expected duration. Pain jd3 

      level reassessed. Patient is alert, oriented x 3, equal unlabored respirations, skin        

      warm/dry/pink. General: Appears in no apparent distress. comfortable, Behavior is           

      appropriate for age. Pain: Complains of pain in right arm Quality of pain is described      

      as aching, tender. Neuro: Level of Consciousness is awake, alert, obeys commands,           

      Oriented to person, place, time. Cardiovascular: Capillary refill < 3 seconds Patient's     

      skin is warm and dry. Respiratory: Airway is patent Respiratory effort is even,             

      Respiratory pattern is regular, tachypnea. GI: No signs and/or symptoms were reported       

      involving the gastrointestinal system. : No signs and/or symptoms were reported           

      regarding the genitourinary system. EENT: No signs and/or symptoms were reported            

      regarding the EENT system. Derm: Skin is intact, Skin is dry, Skin is normal, Skin          

      temperature is warm.                                                                        

08:48 Reassessment: Patient appears in no apparent distress at this time. No changes from     jd3 

      previously documented assessment. Patient and/or family updated on plan of care and         

      expected duration. Pain level reassessed. Patient is alert, oriented x 3, equal             

      unlabored respirations, skin warm/dry/pink.                                                 

09:30 Reassessment: Patient appears in no apparent distress at this time. No changes from     jd3 

      previously documented assessment. Patient and/or family updated on plan of care and         

      expected duration. Pain level reassessed. Patient is alert, oriented x 3, equal             

      unlabored respirations, skin warm/dry/pink.                                                 

10:29 Reassessment: Patient appears in no apparent distress at this time. Patient and/or      jd3 

      family updated on plan of care and expected duration. Pain level reassessed. Patient is     

      alert, oriented x 3, equal unlabored respirations, skin warm/dry/pink. Patient states       

      feeling better.                                                                             

                                                                                                  

Vital Signs:                                                                                      

05:20  / 73; Pulse 50; Resp 34; Temp 98.6(O); Pulse Ox 95% on R/A; Weight 68.04 kg (R); jb4 

      Height 5 ft. 4 in. (162.56 cm); Pain 10/10;                                                 

08:48 BP 98 / 72; Pulse 55; Resp 25 S; Pulse Ox 95% on 2 lpm NC;                              jd3 

10:32 Pulse 56; Resp 22 S; Pulse Ox 94% on R/A;                                               jd3 

05:20 Body Mass Index 25.75 (68.04 kg, 162.56 cm)                                             jb4 

                                                                                                  

ED Course:                                                                                        

04:20 Patient arrived in ED.                                                                  ag3 

05:26 Triage completed.                                                                       jb4 

05:26 Arm band placed on right wrist.                                                         jb4 

05:39 Patient has correct armband on for positive identification. Bed in low position. Call   ea  

      light in reach. Side rails up X2.                                                           

06:08 Jose Juan Enamorado PA is PHCP.                                                              jmm 

06:08 Neal Mejia MD is Attending Physician.                                             jmm 

06:29 Inserted saline lock: 22 gauge in left forearm, using aseptic technique.                ea  

06:48 Foot Left 3 View XRAY In Process Unspecified.                                           EDMS

07:04 CT Head C Spine In Process Unspecified.                                                 EDMS

07:28 Mario Montanez RN is Primary Nurse.                                                  jd3 

10:29 No provider procedures requiring assistance completed. IV discontinued, intact,         jd3 

      bleeding controlled, No redness/swelling at site. Pressure dressing applied.                

                                                                                                  

Administered Medications:                                                                         

06:27 Drug: fentaNYL (PF) 50 mcg Route: IVP; Site: left forearm;                              ea  

07:20 Follow up: Response: No adverse reaction; RASS: Alert and Calm (0)                      jd3 

06:29 Drug: Promethazine 12.5 mg Route: IVP; Site: left forearm;                              ea  

07:20 Follow up: Response: No adverse reaction                                                jd3 

                                                                                                  

                                                                                                  

Outcome:                                                                                          

08:48 Discharge ordered by MD.                                                                annemarie 

10:29 Discharged to home via wheelchair, with family.                                         jd3 

10:29 Condition: stable                                                                           

10:29 Discharge instructions given to patient, family, Instructed on discharge instructions,      

      follow up and referral plans. medication usage, Demonstrated understanding of               

      instructions, follow-up care, medications, Prescriptions given X 1.                         

10:32 Patient left the ED.                                                                    jd3 

                                                                                                  

Signatures:                                                                                       

Dispatcher [employer]                           EDMS                                                 

Jose Juan Enamorado PA PA jmm Bryson, James, RN                       RN   jb4                                                  

Augusta Roach RN                      RN   Mario Ding RN                    RN   jd3                                                  

Antonieta Jara3                                                  

                                                                                                  

Corrections: (The following items were deleted from the chart)                                    

06:29 06:29 Promethazine 12.5 mg IVP in left femoral ea                                       ea  

06:34 06:33 Patient admitted, IV remains in place. ea                                         ea  

0634 06:33 Condition: stable ea                                                              ea  

:34 06:33 Instructed on the need for admit, Demonstrated understanding of instructions, ea  ea  

:34 06:33 Admitted to Med/surg accompanied by nurse, via stretcher, with oxygen, with       ea  

      chart, Report called to Receiving nurse on fourth floor ea                                  

                                                                                                  

**************************************************************************************************

## 2021-07-07 NOTE — XMS REPORT
Continuity of Care Document

                             Created on:2021



Patient:MARKIE CORTES

Sex:Female

:1950

External Reference #:[id number]





Demographics







                          Address                   [address]



                                                    [place] [address]

 

                          Home Phone                [phone]

 

                          Work Phone                [phone]

 

                          Mobile Phone              [phone]

 

                          Email Address             [email or web address]

 

                          Preferred Language        English

 

                          Marital Status            Unknown

 

                          Church Affiliation     Unknown

 

                          Race                      Unknown

 

                          Additional Race(s)        Unavailable



                                                    White

 

                          Ethnic Group              Unknown









Author







                          Organization              [facility]

t

 

                          Address                   [address] [place] [address] [address]



                                                    [place] [address]

 

                          Phone                     [phone]









Support







                Name            Relationship    Address         Phone

 

                Josafat           Spouse          [address]   [phone]



                                                [place] [address] 









Care Team Providers







                    Name                Role                Phone

 

                    Doctor Unassigned,  Name Attending Clinician Unavailable









Problems

This patient has no known problems.



Allergies, Adverse Reactions, Alerts

This patient has no known allergies or adverse reactions.



Medications

This patient has no known medications.



Procedures

This patient has no known procedures.



Encounters







        Start   End     Encounter Admission Attending Care    Care    Encounter 

Source



        Date/Time Date/Time Type    Type    Clinicians Facility Department ID   

   

 

        2020 Orders          Doctor  LUIS    [id number]

05 



        00:00:00 00:00:00 Only            UnassignedLC   [id number]       

  



                                        [facility] [id number]         



                                                        [phone]         



                                                        009             







Results

This patient has no known results.

## 2021-07-07 NOTE — EDPHYS
Physician Documentation                                                                           

 [facility] [place], [place]                                                                 

Name: Yarelis Maurice                                                                                

Age: 71 yrs                                                                                       

Sex: Female                                                                                       

: 1950                                                                                   

MRN: [id number]                                                                                   

Arrival Date: 2021                                                                          

Time: 04:20                                                                                       

Account#: [id number]                                                                            

Bed 3                                                                                             

Private MD:                                                                                       

ED Physician Neal Mejia                                                                      

HPI:                                                                                              

                                                                                             

06:20 This 71 yrs old  Female presents to ER via Wheelchair with complaints of Body  jmm 

      Aches.                                                                                      

06:20 Details of fall: The patient fell from an upright position. Onset: The symptoms/episode jmm 

      began/occurred acutely, just prior to arrival. Associated injuries: The patient             

      sustained injury to the head, neck injury. The patient has experienced similar episodes     

      in the past. This is a 71 year old female with a history of copd, hypothyroidism, that      

      presents to the ED with complaints of headache and left foot pain following a               

      mechanical fall which occurred just prior to arrival. Patient denies LOC, vomiting.         

      Patient is 1 week s/p right humeral fracture. .                                             

                                                                                                  

Historical:                                                                                       

- Allergies:                                                                                      

05:26 Codeine;                                                                                jb4 

05:26 Sulfa;                                                                                  jb4 

- Home Meds:                                                                                      

05:26 alendronate 70 mg Oral tab once wkly [Active]; alprazolam 0.25 mg Oral tab 1 tab daily  jb4 

      [Active]; amitriptyline 50 mg Oral tab 2 times per day [Active]; levothyroxine 25 mcg       

      tab [Active]; omeprazole 20 mg Oral cpDR 1 cap 2 times per day [Active]; sulfasalazine      

      500 mg Oral tab 2 tab after meals [Active]; VItamin D \T\ E [Active];                       

- PMHx:                                                                                           

05:26 born with 1 kidney; COPD; Hypothyroidism; Osteoporosis; Raynaud's Disease; Rheumatoid   jb4 

      Arthritis; Scleroderma;                                                                     

- PSHx:                                                                                           

05:26 hysterectomy;                                                                           jb4 

                                                                                                  

- Immunization history:: Adult Immunizations up to date.                                          

- Social history:: Smoking status: Patient denies any tobacco usage or history of.                

  Patient/guardian denies using alcohol, street drugs.                                            

                                                                                                  

                                                                                                  

ROS:                                                                                              

06:20 Constitutional: Negative for fever, chills, and weight loss, Cardiovascular: Negative   jmm 

      for chest pain, palpitations, and edema, Respiratory: Negative for shortness of breath,     

      cough, wheezing, and pleuritic chest pain.                                                  

06:20 MS/extremity: Positive for foot pain.                                                       

06:20 Neuro: Positive for headache.                                                               

06:20 All other systems are negative.                                                             

                                                                                                  

Exam:                                                                                             

06:20 Constitutional:  This is a well developed, well nourished patient who is awake, alert,  jmm 

      and in no acute distress. Head/Face:  atraumatic. Eyes:  EOMI, no conjunctival erythema     

      appreciated ENT:  Moist Mucus Membranes Neck:  Trachea midline, Supple Chest/axilla:        

      Normal chest wall appearance and motion.   Cardiovascular:  Regular rate and rhythm.        

      No edema appreciated Respiratory:  Normal respirations, no respiratory distress             

      appreciated Abdomen/GI:  Non distended, soft Back:  Normal ROM Skin:  General               

      appearance color normal                                                                     

06:20 Musculoskeletal/extremity: ROM: intact in all extremities, left lateral foot pain on        

      palpation, compartments are soft, full dorsalis pulse, nvi.                                 

06:20 Skin: Appearance: Color: normal in color.                                                   

06:20 Neuro: Orientation: is normal, Mentation: is normal, Memory: is normal.                     

06:20 Psych: Behavior/mood is pleasant, cooperative.                                              

                                                                                                  

Vital Signs:                                                                                      

05:20  / 73; Pulse 50; Resp 34; Temp 98.6(O); Pulse Ox 95% on R/A; Weight 68.04 kg (R); jb4 

      Height 5 ft. 4 in. (162.56 cm); Pain 10/10;                                                 

08:48 BP 98 / 72; Pulse 55; Resp 25 S; Pulse Ox 95% on 2 lpm NC;                              jd3 

10:32 Pulse 56; Resp 22 S; Pulse Ox 94% on R/A;                                               jd3 

05:20 Body Mass Index 25.75 (68.04 kg, 162.56 cm)                                             jb4 

                                                                                                  

MDM:                                                                                              

06:15 Patient medically screened.                                                             [facility] 

08:46 Data reviewed: vital signs, nurses notes. Counseling: I had a detailed discussion with  annemarie 

      the patient and/or guardian regarding: the historical points, exam findings, and any        

      diagnostic results supporting the discharge/admit diagnosis, radiology results, the         

      need for outpatient follow up, to return to the emergency department if symptoms worsen     

      or persist or if there are any questions or concerns that arise at home. ED course:         

      Imaging studies negative. Patient is advised to follow up with pcp and otherwise given      

      strict return precautions. Patient understood and agrees with the plan of care. .           

                                                                                                  

                                                                                             

06:16 Order name: CT Head C Spine; Complete Time: 07:55                                       [facility] 

                                                                                             

06:16 Order name: Foot Left 3 View XRAY; Complete Time: 08:33                                 [facility] 

                                                                                             

06:16 Order name: Saline Lock; Complete Time: 06:29                                           [facility] 

                                                                                                  

Administered Medications:                                                                         

06:27 Drug: fentaNYL (PF) 50 mcg Route: IVP; Site: left forearm;                              ea  

07:20 Follow up: Response: No adverse reaction; RASS: Alert and Calm (0)                      jd3 

06:29 Drug: Promethazine 12.5 mg Route: IVP; Site: left forearm;                              ea  

07:20 Follow up: Response: No adverse reaction                                                jd3 

                                                                                                  

                                                                                                  

Disposition Summary:                                                                              

21 08:48                                                                                    

Discharge Ordered                                                                                 

      Location: Home                                                                          [facility] 

      Condition: Stable                                                                       [facility] 

      Diagnosis                                                                                   

        - Unspecified injury of head, initial encounter                                       [facility] 

        - Contusion of left foot                                                              [facility] 

      Followup:                                                                               [facility] 

        - With: Private Physician                                                                  

        - When: 2 - 3 days                                                                         

        - Reason: Recheck today's complaints, Continuance of care, Re-evaluation by your           

      physician                                                                                   

      Discharge Instructions:                                                                     

        - Discharge Summary Sheet                                                             [facility] 

        - Head Injury, Adult                                                                  [facility] 

      Forms:                                                                                      

        - Medication Reconciliation Form                                                      [facility] 

        - Thank You Letter                                                                    [facility] 

        - Antibiotic Education                                                                [facility] 

        - Prescription Opioid Use                                                             [facility] 

      Prescriptions:                                                                              

        - orphenadrine citrate 100 mg Oral Tablet Sustained Release                                

            - take 1 tablet by ORAL route 2 times per day As needed; 20 tablet; Refills: 0,   [facility] 

      Product Selection Permitted                                                                 

Addendum:                                                                                         

07/10/2021                                                                                        

     06:26 Co-signature as Attending Physician, Neal Mejia MD.                                 [facility]

                                                                                                  

Signatures:                                                                                       

Dispatcher [employer]                           Jose Juan Pink PA PA jmm Bryson, James, RN                       RN   jb4                                                  

Augusta Roach RN RN ea Holmes, Maurice, MD MD   mh7                                                  

Mario Montanez RN   jd3                                                  

                                                                                                  

**************************************************************************************************

## 2021-07-12 ENCOUNTER — HOSPITAL ENCOUNTER (INPATIENT)
Dept: HOSPITAL 97 - ER | Age: 71
LOS: 2 days | Discharge: HOME HEALTH | DRG: 175 | End: 2021-07-14
Attending: HOSPITALIST | Admitting: HOSPITALIST
Payer: COMMERCIAL

## 2021-07-12 VITALS — BODY MASS INDEX: 24.1 KG/M2

## 2021-07-12 DIAGNOSIS — M06.9: ICD-10-CM

## 2021-07-12 DIAGNOSIS — E21.1: ICD-10-CM

## 2021-07-12 DIAGNOSIS — I73.00: ICD-10-CM

## 2021-07-12 DIAGNOSIS — I26.99: Primary | ICD-10-CM

## 2021-07-12 DIAGNOSIS — I13.0: ICD-10-CM

## 2021-07-12 DIAGNOSIS — Z87.891: ICD-10-CM

## 2021-07-12 DIAGNOSIS — R73.9: ICD-10-CM

## 2021-07-12 DIAGNOSIS — Z79.890: ICD-10-CM

## 2021-07-12 DIAGNOSIS — Z88.1: ICD-10-CM

## 2021-07-12 DIAGNOSIS — J44.1: ICD-10-CM

## 2021-07-12 DIAGNOSIS — Z88.5: ICD-10-CM

## 2021-07-12 DIAGNOSIS — M34.9: ICD-10-CM

## 2021-07-12 DIAGNOSIS — Z79.52: ICD-10-CM

## 2021-07-12 DIAGNOSIS — S42.301D: ICD-10-CM

## 2021-07-12 DIAGNOSIS — Z79.01: ICD-10-CM

## 2021-07-12 DIAGNOSIS — E87.2: ICD-10-CM

## 2021-07-12 DIAGNOSIS — I50.32: ICD-10-CM

## 2021-07-12 DIAGNOSIS — N18.30: ICD-10-CM

## 2021-07-12 DIAGNOSIS — E03.9: ICD-10-CM

## 2021-07-12 DIAGNOSIS — Z20.822: ICD-10-CM

## 2021-07-12 DIAGNOSIS — N17.9: ICD-10-CM

## 2021-07-12 DIAGNOSIS — D63.8: ICD-10-CM

## 2021-07-12 DIAGNOSIS — Z79.899: ICD-10-CM

## 2021-07-12 DIAGNOSIS — F41.9: ICD-10-CM

## 2021-07-12 DIAGNOSIS — J96.01: ICD-10-CM

## 2021-07-12 DIAGNOSIS — Z90.710: ICD-10-CM

## 2021-07-12 LAB
ANISOCYTOSIS BLD QL: (no result)
BLD SMEAR INTERP: (no result)
BUN BLD-MCNC: 32 MG/DL (ref 7–18)
COHGB MFR BLDA: 0.4 % (ref 0–1.5)
GLUCOSE SERPLBLD-MCNC: 93 MG/DL (ref 74–106)
HCT VFR BLD CALC: 40 % (ref 36–45)
INR BLD: 1.11
LYMPHOCYTES # SPEC AUTO: 1.7 K/UL (ref 0.7–4.9)
MORPHOLOGY BLD-IMP: (no result)
OXYHGB MFR BLDA: 91.4 % (ref 94–97)
PMV BLD: 9.5 FL (ref 7.6–11.3)
POIKILOCYTOSIS BLD QL SMEAR: (no result)
POTASSIUM SERPL-SCNC: 4.5 MMOL/L (ref 3.5–5.1)
RBC # BLD: 4.46 M/UL (ref 3.86–4.86)
SAO2 % BLDA: 95.2 % (ref 92–98.5)
TROPONIN (EMERG DEPT USE ONLY): 0.08 NG/ML (ref 0–0.04)

## 2021-07-12 PROCEDURE — 93005 ELECTROCARDIOGRAM TRACING: CPT

## 2021-07-12 PROCEDURE — 80061 LIPID PANEL: CPT

## 2021-07-12 PROCEDURE — 96374 THER/PROPH/DIAG INJ IV PUSH: CPT

## 2021-07-12 PROCEDURE — 80048 BASIC METABOLIC PNL TOTAL CA: CPT

## 2021-07-12 PROCEDURE — 85025 COMPLETE CBC W/AUTO DIFF WBC: CPT

## 2021-07-12 PROCEDURE — 71045 X-RAY EXAM CHEST 1 VIEW: CPT

## 2021-07-12 PROCEDURE — 85610 PROTHROMBIN TIME: CPT

## 2021-07-12 PROCEDURE — 71250 CT THORAX DX C-: CPT

## 2021-07-12 PROCEDURE — 83970 ASSAY OF PARATHORMONE: CPT

## 2021-07-12 PROCEDURE — 93971 EXTREMITY STUDY: CPT

## 2021-07-12 PROCEDURE — 94002 VENT MGMT INPAT INIT DAY: CPT

## 2021-07-12 PROCEDURE — 83735 ASSAY OF MAGNESIUM: CPT

## 2021-07-12 PROCEDURE — 80053 COMPREHEN METABOLIC PANEL: CPT

## 2021-07-12 PROCEDURE — 84100 ASSAY OF PHOSPHORUS: CPT

## 2021-07-12 PROCEDURE — 5A09357 ASSISTANCE WITH RESPIRATORY VENTILATION, LESS THAN 24 CONSECUTIVE HOURS, CONTINUOUS POSITIVE AIRWAY PRESSURE: ICD-10-PCS

## 2021-07-12 PROCEDURE — 82805 BLOOD GASES W/O2 SATURATION: CPT

## 2021-07-12 PROCEDURE — 94640 AIRWAY INHALATION TREATMENT: CPT

## 2021-07-12 PROCEDURE — 94760 N-INVAS EAR/PLS OXIMETRY 1: CPT

## 2021-07-12 PROCEDURE — 82565 ASSAY OF CREATININE: CPT

## 2021-07-12 PROCEDURE — 83880 ASSAY OF NATRIURETIC PEPTIDE: CPT

## 2021-07-12 PROCEDURE — 36415 COLL VENOUS BLD VENIPUNCTURE: CPT

## 2021-07-12 PROCEDURE — 94003 VENT MGMT INPAT SUBQ DAY: CPT

## 2021-07-12 PROCEDURE — 96375 TX/PRO/DX INJ NEW DRUG ADDON: CPT

## 2021-07-12 PROCEDURE — 84484 ASSAY OF TROPONIN QUANT: CPT

## 2021-07-12 PROCEDURE — 84443 ASSAY THYROID STIM HORMONE: CPT

## 2021-07-12 PROCEDURE — 99291 CRITICAL CARE FIRST HOUR: CPT

## 2021-07-12 PROCEDURE — 99292 CRITICAL CARE ADDL 30 MIN: CPT

## 2021-07-12 PROCEDURE — 78582 LUNG VENTILAT&PERFUS IMAGING: CPT

## 2021-07-12 PROCEDURE — 87040 BLOOD CULTURE FOR BACTERIA: CPT

## 2021-07-12 PROCEDURE — 85730 THROMBOPLASTIN TIME PARTIAL: CPT

## 2021-07-12 PROCEDURE — 85379 FIBRIN DEGRADATION QUANT: CPT

## 2021-07-12 PROCEDURE — 84439 ASSAY OF FREE THYROXINE: CPT

## 2021-07-12 PROCEDURE — 93306 TTE W/DOPPLER COMPLETE: CPT

## 2021-07-12 RX ADMIN — ACETAMINOPHEN PRN MG: 500 TABLET, FILM COATED ORAL at 23:04

## 2021-07-12 RX ADMIN — Medication SCH: at 22:13

## 2021-07-12 NOTE — XMS REPORT
Continuity of Care Document

                            Created on:2021



Patient:MARKIE CORTES

Sex:Female

:1950

External Reference #:[id number]





Demographics







                          Address                   [address]



                                                    [place] [address]

 

                          Home Phone                [phone]

 

                          Work Phone                [phone]

 

                          Mobile Phone              [phone]

 

                          Email Address             [email or web address]

 

                          Preferred Language        English

 

                          Marital Status            Unknown

 

                          Adventism Affiliation     Unknown

 

                          Race                      Unknown

 

                          Additional Race(s)        Unavailable



                                                    White

 

                          Ethnic Group              Unknown









Author







                          Organization              [facility]

t

 

                          Address                   [address] [place] [address] [address]



                                                    [place] [address]

 

                          Phone                     [phone]









Support







                Name            Relationship    Address         Phone

 

                Josafat           Spouse          [address]   [phone]



                                                [place] [address] 









Care Team Providers







                    Name                Role                Phone

 

                    Doctor Unassigned,  Name Attending Clinician Unavailable









Problems

This patient has no known problems.



Allergies, Adverse Reactions, Alerts

This patient has no known allergies or adverse reactions.



Medications

This patient has no known medications.



Procedures

This patient has no known procedures.



Encounters







        Start   End     Encounter Admission Attending Care    Care    Encounter 

Source



        Date/Time Date/Time Type    Type    Clinicians Facility Department ID   

   

 

        2020 Orders          Doctor  LUIS    [id number]

05 



        00:00:00 00:00:00 Only            UnassignedLC   [id number]       

  



                                        [facility] [id number]         



                                                        [phone]         



                                                        009             







Results

This patient has no known results.

## 2021-07-12 NOTE — RAD REPORT
EXAM DESCRIPTION:  CT - Thorax Wo Con - 7/12/2021 5:36 pm

 

CLINICAL HISTORY:  DYSPNEA

 

COMPARISON:  Head C Spine Mpr Wo Con dated 7/7/2021; Chest Single View dated 7/12/2021

 

TECHNIQUE:  Axial 5 mm thick images of the chest were obtained without IV contrast.

 

All CT scans are performed using dose optimization technique as appropriate and may include automated
 exposure control or mA/KV adjustment according to patient size.

 

FINDINGS:  Approximately 2.3 centimeter inferior right thyroid lobe nodule is present not fully evalu
ated on this study. Follow-up sonography can be performed as warranted.

 

Patient has extensive subpleural bulla and bleb formation with areas of scarred parenchyma. Interstit
ial pattern overall is prominent in the baseline severity can mask early edema or infiltrate. No foca
l consolidation or mass. No abscess or area cavitation seen. No pleural thickening or pleural effusio
n. No pneumothorax.

 

No abnormal mediastinal or hilar masses or lymphadenopathy seen. No gross aortic or pulmonary artery 
finding suspected.  Assessment is limited in the absence of IV contrast. No cardiomegaly.

 

No chest wall mass or abnormal axillary lymphadenopathy. Patient has bilateral densely calcified lorenza
st implant capsules.

 

Dilated esophagus is present with air-fluid level in the distal esophagus. The mass is not identified
 at the GE junction.

 

IMPRESSION:  Extensive fibro emphysematous lung pattern with no focal consolidation or mass.

 

Approximately 2 centimeter inferior right thyroid lobe nodule. This has been previously identified on
 CT imaging but is not been further characterized with sonography. Outpatient follow-up sonography ca
n be performed as warranted.

## 2021-07-12 NOTE — ER
Nurse's Notes                                                                                     

 [facility]                                                                 

Name: Yarelis Maurice                                                                                

Age: 71 yrs                                                                                       

Sex: Female                                                                                       

: 1950                                                                                   

MRN: [id number]                                                                                   

Arrival Date: 2021                                                                          

Time: 15:30                                                                                       

Account#: [id number]                                                                            

Bed 8                                                                                             

Private MD:                                                                                       

Diagnosis: COPD/ Chronic obstructive pulmonary disease with (acute) exacerbation;Acute pulmonary  

  edema;Hypoxemia;Dyspnea, unspecified                                                            

                                                                                                  

Presentation:                                                                                     

                                                                                             

15:33 Acuity: VALERIE 1                                                                           ca1 

15:50 Chief complaint: Patient states: difficulty breathing that began 45 minutes prior to    ss  

      arrival. Hx of COPD. Coronavirus screen: Client denies travel out of the [place] in the        

      last 14 days. Ebola Screen: Patient denies exposure to infectious person. Patient           

      denies travel to an Ebola-affected area in the 21 days before illness onset. Initial        

      Sepsis Screen: Does the patient meet any 2 criteria? RR > 20 per min. Does the patient      

      have a suspected source of infection? No. Patient's initial sepsis screen is negative.      

      Risk Assessment: Do you want to hurt yourself or someone else? Patient reports no           

      desire to harm self or others. Onset of symptoms was 2021.                         

15:50 Method Of Arrival: Wheelchair                                                           ss  

                                                                                                  

Historical:                                                                                       

- Allergies:                                                                                      

15:52 Codeine; nausea;                                                                        ss  

- PMHx:                                                                                           

15:52 born with 1 kidney; COPD; Hypothyroidism; Osteoporosis; Raynaud's Disease; Rheumatoid   ss  

      Arthritis; Scleroderma;                                                                     

- PSHx:                                                                                           

15:52 hysterectomy;                                                                           ss  

                                                                                                  

- Immunization history:: Adult Immunizations up to date.                                          

- Social history:: Smoking status: Patient/guardian denies using tobacco, but has a               

  distant history of tobacco abuse.                                                               

- Family history:: not pertinent.                                                                 

- Hospitalizations: : No recent hospitalization is reported.                                      

                                                                                                  

                                                                                                  

Screenin:17 Abuse screen: Denies threats or abuse. Denies injuries from another. Nutritional        sv  

      screening: No deficits noted. Tuberculosis screening: No symptoms or risk factors           

      identified. Fall Risk None identified.                                                      

                                                                                                  

Assessment:                                                                                       

15:50 General: Appears in no apparent distress. uncomfortable, well developed, Behavior is    sv  

      cooperative, anxious. Pain: Denies pain. Neuro: Level of Consciousness is awake, alert,     

      obeys commands, Oriented to person, place, time, situation, Weakness in right arm(s).       

      Cardiovascular: Patient's skin is warm and dry. Rhythm is sinus rhythm. Respiratory:        

      Reports shortness of breath Airway is patent Respiratory effort is even, labored,           

      Respiratory pattern is hyperventilation. Derm: Skin is normal. Musculoskeletal: Range       

      of motion: limited in right shoulder and right elbow Family stated that she has a           

      fracture to her right arm and right face. Pt currently has a arm sling on her right arm.    

16:10 Reassessment: Pt placed on Hi flow O2 \T\ 20L 50% FiO2.                                   sv  

16:46 Reassessment: Ultrasound at the bedside.                                                sv  

16:58 Reassessment: Patient appears in no apparent distress at this time. Patient and/or      sv  

      family updated on plan of care and expected duration. Pain level reassessed. Patient is     

      alert, oriented x 3, equal unlabored respirations, skin warm/dry/pink.                      

17:11 Reassessment: Patient appears in no apparent distress at this time. Patient and/or      sv  

      family updated on plan of care and expected duration. Pain level reassessed. Patient is     

      alert, oriented x 3, equal unlabored respirations, skin warm/dry/pink. Patient states       

      symptoms have improved.                                                                     

17:37 Reassessment: Pt currently in CT.                                                       sv  

17:56 Reassessment: Patient appears in no apparent distress at this time. Patient and/or      sv  

      family updated on plan of care and expected duration. Pain level reassessed. Patient is     

      alert, oriented x 3, equal unlabored respirations, skin warm/dry/pink. Patient states       

      feeling better. Patient states symptoms have improved.                                      

18:57 Reassessment: Patient appears in no apparent distress at this time. Patient and/or      sv  

      family updated on plan of care and expected duration. Pain level reassessed. Patient is     

      alert, oriented x 3, equal unlabored respirations, skin warm/dry/pink. Family at the        

      bedside.                                                                                    

20:26 Reassessment: Hospitalist at bedside updating pt on plan of care.                       ea  

20:34 Reassessment: Patient and/or family updated on plan of care and expected duration. Pain ak2 

      level reassessed.                                                                           

                                                                                                  

Vital Signs:                                                                                      

15:33  / 54; Pulse 77; Resp 32 S; Temp 97.2(TE); Pulse Ox 82% on R/A; Weight 68.04 kg;  ss  

      Pain 0/10;                                                                                  

15:45  / 54; Pulse 69 MON; Resp 31;                                                     sv  

17:11  / 69; Pulse 65; Resp 30;                                                         sv  

17:35 Pulse 63; Resp 18;                                                                      sv  

18:00  / 80; Pulse 67; Resp 18; Pulse Ox 95% on 4 lpm NC;                               sv  

18:50  / 89; Pulse 61; Resp 25; Pulse Ox 95% on 4 lpm NC;                               sv  

20:34  / 81; Pulse 65; Resp 20; Pulse Ox 96% ;                                          ak2 

15:45 Sinus Rhythm                                                                            sv  

17:11 HI-flow O2 20L FiOw 50%                                                                 sv  

17:35 Pt on O2 \T\ 4L per NC.                                                                   sv  

                                                                                                  

ED Course:                                                                                        

15:30 Patient arrived in ED.                                                                  rg4 

15:34 Triage completed.                                                                       ca1 

15:34 Arm band placed on right wrist. Patient placed in an exam room, on oxygen, on pulse     ca1 

      oximetry.                                                                                   

15:36 Steffen Salazar MD is Attending Physician.                                                rn  

15:42 Joan La, RN is Primary Nurse.                                                  sv  

16:05 Patient has correct armband on for positive identification. Bed in low position. Call   sv  

      light in reach. Side rails up X2. Adult w/ patient. Cardiac monitor on. Pulse ox on.        

      NIBP on. Door closed. Warm blanket given. Head of bed elevated.                             

16:07 Missed attempt(s): 22 gauge in left forearm. Bleeding controlled, band aid applied,     sv  

      catheter tip intact.                                                                        

16:16 X-ray(s) taken.                                                                         sv  

16:22 XRAY CXR (1 view) In Process Unspecified.                                               EDMS

16:45 Inserted inserted 10 cm, 20 gauge power glide midline to L upper arm using aseptic      ss  

      technique. Pt tolerated well.                                                               

16:46 BMP Sent.                                                                               sv  

17:10 UPPER EXTREMITY VENOUS UNILATE In Process Unspecified.                                  EDMS

17:36 Thorax Wo Con In Process Unspecified.                                                   EDMS

17:56 Awaiting lab results, Awaiting radiology results.                                       sv  

18:11 Kaleb Brunson DO is Hospitalizing Provider.                                           rn  

19:01 Primary Nurse role handed off by Joan La, RN                                   mw2 

19:09 Report given to Alan AUGUSTIN and Augusta AUGUSTIN.                                                   sv  

20:26 No provider procedures requiring assistance completed. Patient admitted, IV remains in  ea  

      place.                                                                                      

                                                                                             

07:35 Pippa Guerra, RN is Primary Nurse.                                                        tw2 

                                                                                                  

Administered Medications:                                                                         

                                                                                             

15:55 Drug: Xopenex (levalbuterol) 1.25 mg Route: Inhalation;                                 sv  

16:38 Drug: SOLU-Medrol (methylPrednisoLONE) 125 mg Route: IVP; Site: left upper arm;         ss  

17:00 Follow up: Response: No adverse reaction                                                sv  

16:41 CANCELLED (Duplicate Order): Valium (diazepam) 2 mg PO once                             rn  

16:46 Drug: Ativan (LORazepam) 0.5 mg Route: IVP; Site: left upper arm;                       sv  

17:57 Follow up: Response: No adverse reaction                                                sv  

18:57 Drug: Lasix (furosemide) 40 mg Route: IVP; Site: left upper arm;                        sv  

                                                                                                  

                                                                                                  

Outcome:                                                                                          

18:11 Decision to Hospitalize by Provider.                                                    rn  

20:26 Admitted to ER Hold.  Please see [facility] for further documentation.                    michelle  

20:26 Condition: stable                                                                           

20:26 Instructed on the need for admit.                                                           

                                                                                             

11:26 Patient left the ED.                                                                    tw2 

                                                                                                  

Signatures:                                                                                       

Dispatcher [employer]                           EDJoan Toledo RN RN                                                      

Steffen Salazar MD MD rn Smirch, Shelby, RN RN                                                      

Pippa Guerra RN RN   tw2                                                  

Tabatha Marie                                 rg4                                                  

Augusta Roach RN                      RN                                                      

Jenise Arrington                            mw2                                                  

Caryn Hernandez RN                        RN   [facility]                                                  

Jens Otero                             ak2                                                  

                                                                                                  

Corrections: (The following items were deleted from the chart)                                    

                                                                                             

17:28 15:33 Pulse 77bpm; Resp 32bpm; Spontaneous; Pulse Ox 82% RA; ca1                        ss  

                                                                                                  

**************************************************************************************************

## 2021-07-12 NOTE — RAD REPORT
EXAM DESCRIPTION:  US - UPPER EXTREMITY VENOUS UNILATE - 7/12/2021 5:29 pm

 

CLINICAL HISTORY:  Right arm pain and swelling

 

COMPARISON:  None.

 

TECHNIQUE:  Real-time sonographic evaluation of the right upper extremity deep venous systems was per
formed.

 

FINDINGS:  Exam was limited. Patient had arm fracture with limited range of motion.

 

Normal compressibility, flow augmentation, phasic flow and spontaneous flow are identified in the rig
ht upper extremity deep venous system. No intraluminal filling defects seen. Internal jugular and sub
clavian veins are normal as well.

 

IMPRESSION:  No DVT in the right upper extremity.

## 2021-07-12 NOTE — RAD REPORT
EXAM DESCRIPTION:  RAD - Chest Single View - 7/12/2021 4:22 pm

 

CLINICAL HISTORY:  DYSPNEA

 

COMPARISON:  Portable June 30

 

TECHNIQUE:  AP portable chest image was obtained 7/12/2021 4:22 pm .

 

FINDINGS:  Extensive interstitial opacification is present in a pattern not substantially different f
rom comparison. Patient has previously demonstrated chronic interstitial lung disease. Superimposed i
nterstitial edema or infiltrate can easily be masked in this setting.

 

Trachea is midline. Heart and vasculature are normal. No measurable pleural effusion and no pneumotho
rax. No acute bony abnormality seen. No acute aortic findings. Densely calcified breast implant capsu
les are seen.

 

IMPRESSION:  Extensive chronic interstitial lung disease similar to June 30.

 

Interstitial edema and infiltrate can be masked by the severity chronic disease.

## 2021-07-12 NOTE — EDPHYS
Physician Documentation                                                                           

 [facility]                                                                 

Name: Yarelis Maurice                                                                                

Age: 71 yrs                                                                                       

Sex: Female                                                                                       

: 1950                                                                                   

MRN: [id number]                                                                                   

Arrival Date: 2021                                                                          

Time: 15:30                                                                                       

Account#: [id number]                                                                            

Bed 8                                                                                             

Private MD:                                                                                       

ED Physician Steffen Salazar                                                                         

HPI:                                                                                              

                                                                                             

15:39 This 71 yrs old  Female presents to ER via Unassigned with complaints of       rn  

      Breathing Difficulty.                                                                       

15:39 The patient has shortness of breath at rest. Onset: The symptoms/episode began/occurred rn  

      just prior to arrival. Duration: The symptoms are continuous. The patient's shortness       

      of breath is aggravated by nothing, is alleviated by nothing. Associated signs and          

      symptoms: Pertinent positives: SOB, Pertinent negatives: chest pain, non-productive         

      cough, productive cough, diaphoresis, dizziness, fever, hemoptysis, loss of                 

      consciousness. Severity of symptoms: At their worst the symptoms were moderate in the       

      emergency department the symptoms have improved. The patient has experienced similar        

      episodes in the past. The patient has been recently seen by a physician:. Sent by Dr. Mari for sob and dyspnea, no fever, no recent rib or thoracic injury. + COPD. Family       

      reports sob that just began approx 45 min ago. Has Raynaud's so not sure if oxygen          

      saturation is accurate right now. No abd pain. No chest pain. .                             

                                                                                                  

Historical:                                                                                       

- Allergies:                                                                                      

15:52 Codeine; nausea;                                                                        ss  

- PMHx:                                                                                           

15:52 born with 1 kidney; COPD; Hypothyroidism; Osteoporosis; Raynaud's Disease; Rheumatoid   ss  

      Arthritis; Scleroderma;                                                                     

- PSHx:                                                                                           

15:52 hysterectomy;                                                                           ss  

                                                                                                  

- Immunization history:: Adult Immunizations up to date.                                          

- Social history:: Smoking status: Patient/guardian denies using tobacco, but has a               

  distant history of tobacco abuse.                                                               

- Family history:: not pertinent.                                                                 

- Hospitalizations: : No recent hospitalization is reported.                                      

                                                                                                  

                                                                                                  

ROS:                                                                                              

15:39 Constitutional: Negative for fever, chills, and weight loss, Eyes: Negative for injury, rn  

      pain, redness, and discharge, ENT: Negative for injury, pain, and discharge, Neck:          

      Negative for injury, pain, and swelling, Cardiovascular: Negative for chest pain,           

      palpitations, and edema, Respiratory: + sob Abdomen/GI: Negative for abdominal pain,        

      nausea, vomiting, diarrhea, and constipation, Back: Negative for injury and pain,           

      MS/Extremity: Negative for injury and deformity, Skin: Negative for injury, rash, and       

      discoloration, Neuro: Negative for headache, weakness, numbness, tingling, and seizure.     

                                                                                                  

Exam:                                                                                             

15:39 Constitutional:  This is a well developed, well nourished patient who is awake, alert,  rn  

      + tachypnea that is slowed down with coaching Head/Face:  Normocephalic, atraumatic.        

      Eyes:  + right periorbital ecchymosis that is green and yellow/healing. Chest/axilla:       

      Normal chest wall appearance and motion.  Nontender with no deformity.  No lesions are      

      appreciated. Cardiovascular:  Regular rate and rhythm.  No pulse deficits. Respiratory:     

       + tachypnea, faint wheezing, no retractions Abdomen/GI:  soft, non-tender Skin:  Warm,     

      dry MS/ Extremity:  Pulses equal, no cyanosis Neuro:  Awake and alert, GCS 15               

                                                                                                  

Vital Signs:                                                                                      

15:33  / 54; Pulse 77; Resp 32 S; Temp 97.2(TE); Pulse Ox 82% on R/A; Weight 68.04 kg;  ss  

      Pain 0/10;                                                                                  

15:45  / 54; Pulse 69 MON; Resp 31;                                                     sv  

17:11  / 69; Pulse 65; Resp 30;                                                         sv  

17:35 Pulse 63; Resp 18;                                                                      sv  

18:00  / 80; Pulse 67; Resp 18; Pulse Ox 95% on 4 lpm NC;                               sv  

18:50  / 89; Pulse 61; Resp 25; Pulse Ox 95% on 4 lpm NC;                               sv  

20:34  / 81; Pulse 65; Resp 20; Pulse Ox 96% ;                                          ak2 

15:45 Sinus Rhythm                                                                            sv  

17:11 HI-flow O2 20L FiOw 50%                                                                 sv  

17:35 Pt on O2 \T\ 4L per NC.                                                                   sv  

                                                                                                  

MDM:                                                                                              

15:37 Patient medically screened.                                                             rn  

18:09 Differential diagnosis: Anxiety Reaction Bronchitis Chronic Obstructive Pulmonary       rn  

      Disease Myocardial Infarction pneumonia, Pneumothorax pulmonary edema, Pulmonary            

      Embolism. Data reviewed: vital signs, nurses notes, lab test result(s), EKG, radiologic     

      studies, CT scan, plain films, ultrasound, and as a result, I will admit patient.           

      Counseling: I had a detailed discussion with the patient and/or guardian regarding: the     

      historical points, exam findings, and any diagnostic results supporting the                 

      discharge/admit diagnosis, lab results, radiology results, the need for further work-up     

      and treatment in the hospital. Response to treatment: the patient's symptoms have           

      mildly improved after treatment, and as a result, I will admit patient. Admission           

      orders: after a detailed discussion of the patient's condition and case, the admit          

      orders are written by me. ED course: Pt with elevated BNP and troponin, likely 2/2 CHF,     

      patient denies hx of CHF or taking diuretics. Also component of COPD. UNable to obtain      

      PE study that Dr. Mari sent patient for given elevated creatinine, will admit for VQ       

      study and diuresis/cardiology consultation..                                                

                                                                                                  

                                                                                             

15:38 Order name: BMP                                                                         rn  

                                                                                             

15:38 Order name: Blood Culture Adult (2)                                                     rn  

                                                                                             

15:38 Order name: CBC with Diff; Complete Time: 09:35                                         rn  

                                                                                             

15:38 Order name: NT PRO-BNP; Complete Time: 17:53                                            rn  

                                                                                             

15:38 Order name: PT-INR; Complete Time: 17:23                                                rn  

                                                                                             

15:38 Order name: Ptt, Activated; Complete Time: 17:23                                        rn  

                                                                                             

15:38 Order name: Troponin (emerg Dept Use Only); Complete Time: 17:53                        rn  

                                                                                             

15:38 Order name: Basic Metabolic Panel; Complete Time: 17:53                                 [facility]

                                                                                             

19:38 Order name: CBC Smear Scan; Complete Time: 09:35                                        [facility]

                                                                                             

20:14 Order name: COVID-19 : Document "Date of Symptom Onset" if Symptomatic.                 ak2 

                                                                                             

20:30 Order name: CORONAVIRUS                                                                 [facility]

                                                                                             

21:26 Order name: SARS-COV-2 RT PCR; Complete Time: 09:35                                     [facility]

                                                                                             

22:52 Order name: ABG Arterial Blood Gas; Complete Time: 09:35                                [facility]

                                                                                             

15:38 Order name: XRAY CXR (1 view); Complete Time: 16:55                                     rn  

                                                                                             

17:06 Order name: Thorax Wo Con; Complete Time: 17:55                                         [facility]

                                                                                             

17:09 Order name: UPPER EXTREMITY VENOUS UNILATE; Complete Time: 17:53                        [facility]

                                                                                             

00:04 Order name: D-Dimer; Complete Time: 09:35                                               [facility]

                                                                                             

02:33 Order name: CREATININE WHOLE BLOOD; Complete Time: 09:35                                [facility]

                                                                                             

05:32 Order name: CBC with Automated Diff; Complete Time: 09:35                               [facility]

                                                                                             

05:41 Order name: PTH Intact; Complete Time: 09:35                                            [facility]

                                                                                             

06:02 Order name: Troponin I; Complete Time: 09:35                                            [facility]

                                                                                             

06:12 Order name: Comprehensive Metabolic Panel; Complete Time: 09:35                         [facility]

                                                                                             

06:12 Order name: Phosphorus; Complete Time: 09:35                                            [facility]

                                                                                             

06:12 Order name: Lipid Profile; Complete Time: 09:35                                         [facility]

                                                                                             

06:12 Order name: T4 Free; Complete Time: 09:35                                               [facility]

                                                                                             

06:12 Order name: Magnesium; Complete Time: 09:35                                             [facility]

                                                                                             

06:12 Order name: Thyroid Stimulating Hormone; Complete Time: 09:35                           [facility]

                                                                                             

15:38 Order name: EKG; Complete Time: 15:39                                                   rn  

                                                                                             

15:38 Order name: Cardiac monitoring; Complete Time: 16:19                                    rn  

                                                                                             

15:38 Order name: EKG - Nurse/Tech; Complete Time: 17:37                                      rn  

                                                                                             

15:38 Order name: IV Saline Lock; Complete Time: 16:46                                        rn  

                                                                                             

15:38 Order name: Labs collected and sent; Complete Time: 16:46                               rn  

12                                                                                             

15:38 Order name: O2 Per Protocol; Complete Time: 16:19                                       rn  

12                                                                                             

15:38 Order name: O2 Sat Monitoring; Complete Time: 16:19                                     rn  

12                                                                                             

20:03 Order name: CONS Physician Consult                                                      [facility]

                                                                                             

08:47 Order name: NM; Complete Time: 09:35                                                    [facility]

                                                                                                  

Administered Medications:                                                                         

15:55 Drug: Xopenex (levalbuterol) 1.25 mg Route: Inhalation;                                 sv  

16:38 Drug: SOLU-Medrol (methylPrednisoLONE) 125 mg Route: IVP; Site: left upper arm;         ss  

17:00 Follow up: Response: No adverse reaction                                                sv  

16:41 CANCELLED (Duplicate Order): Valium (diazepam) 2 mg PO once                             rn  

16:46 Drug: Ativan (LORazepam) 0.5 mg Route: IVP; Site: left upper arm;                       sv  

17:57 Follow up: Response: No adverse reaction                                                sv  

18:57 Drug: Lasix (furosemide) 40 mg Route: IVP; Site: left upper arm;                        sv  

                                                                                                  

                                                                                                  

Disposition Summary:                                                                              

21 18:11                                                                                    

Hospitalization Ordered                                                                           

      Hospitalization Status: Inpatient Admission                                             rn  

      Provider: Kaleb Brunson                                                                rn  

      Condition: Stable                                                                       rn  

      Problem: new                                                                            rn  

      Symptoms: have improved                                                                 rn  

      Bed/Room Type: Standard                                                                 rn  

      Location: Telemetry/MedSurg (Inpatient)(21 10:26)                                 mt  

      Room Assignment: [facility](21 10:26)                                                    mt  

      Diagnosis                                                                                   

        - COPD/ Chronic obstructive pulmonary disease with (acute) exacerbation               rn  

        - Acute pulmonary edema                                                               rn  

        - Hypoxemia                                                                           rn  

        - Dyspnea, unspecified                                                                rn  

      Forms:                                                                                      

        - Medication Reconciliation Form                                                      rn  

        - SBAR form                                                                           rn  

Signatures:                                                                                       

Dispatcher [employer]                           EDMS                                                 

Joan La RN                    RN   Steffen Pickens MD MD rn Smirch, Shelby RN                      RN   Elana Stevens, RN                       Amelia Garcia                             mt                                                   

                                                                                                  

Corrections: (The following items were deleted from the chart)                                    

16:41 16:41 Valium (diazepam) 2 mg PO once ordered. rn                                        rn  

17:06 15:38 Chest For PE Angio+CT.RAD.BRZ ordered. EDMS                                       EDMS

17:09 15:39 Extremity Venous Uni Ltd+US.RAD.BRZ ordered. EDMS                                 EDMS

21:10 18:11 Telemetry/MedSurg (Inpatient) rn                                                  cg  

21:10 18:11 rn                                                                                cg  

                                                                                             

10:26  21:10 [facility] ER HOLD cg                                                             mt  

                                                                                             

10:26  21:10 ERHOLD- cg                                                                  mt  

                                                                                                  

**************************************************************************************************

## 2021-07-13 LAB
ALBUMIN SERPL BCP-MCNC: 3.8 G/DL (ref 3.4–5)
ALP SERPL-CCNC: 103 U/L (ref 45–117)
ALT SERPL W P-5'-P-CCNC: 17 U/L (ref 12–78)
AST SERPL W P-5'-P-CCNC: 16 U/L (ref 15–37)
BUN BLD-MCNC: 39 MG/DL (ref 7–18)
GLUCOSE SERPLBLD-MCNC: 109 MG/DL (ref 74–106)
HCT VFR BLD CALC: 40.3 % (ref 36–45)
HDLC SERPL-MCNC: 47 MG/DL (ref 40–60)
LDLC SERPL CALC-MCNC: 113 MG/DL (ref ?–130)
LYMPHOCYTES # SPEC AUTO: 1.1 K/UL (ref 0.7–4.9)
MAGNESIUM SERPL-MCNC: 2 MG/DL (ref 1.8–2.4)
PMV BLD: 8.1 FL (ref 7.6–11.3)
POTASSIUM SERPL-SCNC: 4.2 MMOL/L (ref 3.5–5.1)
RBC # BLD: 4.54 M/UL (ref 3.86–4.86)
TSH SERPL DL<=0.05 MIU/L-ACNC: 2.31 UIU/ML (ref 0.36–3.74)

## 2021-07-13 RX ADMIN — IPRATROPIUM BROMIDE SCH MG: 0.5 SOLUTION RESPIRATORY (INHALATION) at 01:15

## 2021-07-13 RX ADMIN — METHYLPREDNISOLONE SODIUM SUCCINATE SCH MG: 40 INJECTION, POWDER, FOR SOLUTION INTRAMUSCULAR; INTRAVENOUS at 09:00

## 2021-07-13 RX ADMIN — Medication SCH ML: at 09:00

## 2021-07-13 RX ADMIN — IPRATROPIUM BROMIDE SCH MG: 0.5 SOLUTION RESPIRATORY (INHALATION) at 19:50

## 2021-07-13 RX ADMIN — ALBUTEROL SULFATE SCH MG: 2.5 SOLUTION RESPIRATORY (INHALATION) at 19:50

## 2021-07-13 RX ADMIN — ALBUTEROL SULFATE SCH MG: 2.5 SOLUTION RESPIRATORY (INHALATION) at 14:17

## 2021-07-13 RX ADMIN — METHYLPREDNISOLONE SODIUM SUCCINATE SCH MG: 40 INJECTION, POWDER, FOR SOLUTION INTRAMUSCULAR; INTRAVENOUS at 16:27

## 2021-07-13 RX ADMIN — IPRATROPIUM BROMIDE SCH MG: 0.5 SOLUTION RESPIRATORY (INHALATION) at 14:17

## 2021-07-13 RX ADMIN — Medication SCH ML: at 20:57

## 2021-07-13 RX ADMIN — METHYLPREDNISOLONE SODIUM SUCCINATE SCH MG: 40 INJECTION, POWDER, FOR SOLUTION INTRAMUSCULAR; INTRAVENOUS at 01:00

## 2021-07-13 RX ADMIN — APIXABAN SCH MG: 2.5 TABLET, FILM COATED ORAL at 20:56

## 2021-07-13 RX ADMIN — ACETAMINOPHEN PRN MG: 500 TABLET, FILM COATED ORAL at 20:55

## 2021-07-13 RX ADMIN — AMITRIPTYLINE HYDROCHLORIDE SCH: 50 TABLET, FILM COATED ORAL at 20:57

## 2021-07-13 RX ADMIN — IPRATROPIUM BROMIDE SCH: 0.5 SOLUTION RESPIRATORY (INHALATION) at 08:00

## 2021-07-13 RX ADMIN — ACETAMINOPHEN PRN MG: 500 TABLET, FILM COATED ORAL at 09:00

## 2021-07-13 NOTE — RAD REPORT
EXAM DESCRIPTION:  NM - Vent Perfusion VQ Scan - 7/13/2021 8:31 am

 

CLINICAL HISTORY:   Shortness of breath

 

COMPARISON:   July 12, 2021

 

TECHNIQUE:

20.3 Mci Xe133  was administered by inhalation. First breath, equilibrium, and washout images of the 
lungs obtained

 

7.1 millicuries Technetium-99 MAA was administered intravenously. Anterior, posterior, lateral and ob
lique views of the lungs were taken.

 

FINDINGS:  Small, bilateral diminished radiotracer uptake involves the lungs which is relatively matc
hed on ventilation and perfusion sequences.

 

No large mismatch perfusion defects seen

 

COPD is demonstrated

 

IMPRESSION:  The patient has a low to intermediate probability for pulmonary embolus

## 2021-07-13 NOTE — P.HP
Certification for Inpatient


Patient admitted to: Inpatient


With expected LOS: >2 Midnights


Patient will require the following post-hospital care: None


Practitioner: I am a practitioner with admitting privileges, knowledge of 

patient current condition, hospital course, and medical plan of care.


Services: Services provided to patient in accordance with Admission requirements

found in Title 42 Section 412.3 of the Code of Federal Regulations





Patient History


Date of Service: 07/12/21


Reason for admission: SOB


History of Present Illness: 


Ms. Maurice is a 72 yo F with RA, Raynaud's, scleoderma, hypothyroidism, and COPD 

here today for SOB that has been worsening over the past 4 weeks. Mild relief of

symptoms with albuterol inhaler. Reports worsening of symptoms with anxiety and 

when walking. Reports cough but denies sputum, wheezing, orthopnea, PND, and 

edema. Sent to ED by PCP for rule out of PE. Cannot tolerate CTPE due to kidney 

function. CT w/o contrast showed extensive fribro emphysematous lung pattern 

with no focal consolidation or mass. Quit smoking 10 years ago. BUN 32, Cr 2.36,

GFR 20. Ca2+ 12.1. Trop 0.08, BNP 6151. EKG wnl. Ddimer 4178. Received lasix, 

zoponex, ativan, and solumedrol in the ED. Admitted for further evaluation and 

treatment. 


Allergies





codeine Allergy (Verified 06/30/21 20:25)


   throwing up


Sulfa (Sulfonamide Antibiotics) Allergy (Verified 06/30/21 20:25)


   Itching/Hives/Rash





Home Medications: 








ALPRAZolam [Alprazolam] 0.25 mg PO DAILY 07/01/21 


Amitriptyline [Elavil*] 50 mg PO BID 07/01/21 


Levothyroxine Sodium [Levothyroxine] 25 mcg PO DAILY 07/01/21 


sulfaSALAzine [Sulfasalazine] 500 mg PO DAILY 07/01/21 


traMADol HCL [Ultram*] 50 mg PO Q6H PRN #20 tab 07/04/21 








- Past Medical/Surgical History


Has patient received pneumonia vaccine in the past: Yes


Diabetic: No


-: Rheumatoid arthritis


-: Raynaud's disease


-: Hypothyroidism


-: COPD


-: Hysterectomy


-: Rectal reconstruction surgery





- Family History


  ** Father


-: Cancer





  ** Brother


-: Cancer





- Social History


Smoking Status: Former smoker


Alcohol use: No


CD- Drugs: No


Caffeine use: No


Place of Residence: Home





Review of Systems


10-point ROS is otherwise unremarkable


Respiratory: Cough, Shortness of Breath, SOB with Excertion





Physical Examination





- Vital Signs


Blood Pressure: 131/72


Pulse: 71


Respirations: 18


Pulse Ox (%): 96





- Physical Exam


General: Alert, In no apparent distress


HEENT: Atraumatic, PERRLA, Mucous membr. moist/pink, EOMI, Sclerae nonicteric


Neck: Supple, 2+ carotid pulse no bruit, No LAD, Without JVD or thyroid 

abnormality


Respiratory: Diminished


Cardiovascular: Regular rate/rhythm, Normal S1 S2


Gastrointestinal: Normal bowel sounds, No tenderness


Musculoskeletal: No tenderness


Integumentary: No rashes


Neurological: Normal speech, Normal strength at 5/5 x4 extr, Normal tone, Normal

 affect


Lymphatics: No axilla or inguinal lymphadenopathy





- Studies


Laboratory Data (last 24 hrs)





07/12/21 16:36: PT 12.8 H, INR 1.11, APTT 32.4


07/12/21 16:36: WBC 10.00  D, Hgb 12.6, Hct 40.0  D, Plt Count 310  D


07/12/21 16:36: Sodium 138, Potassium 4.5, BUN 32 H, Creatinine 2.36 H, Glucose 

93








Assessment and Plan





- Plan


Assessment


SOB, rule out PE, COPD vs CHF exacerbation


COPD


Hypothyroidism


Scleroderma


Raynaud's


RA





Plan


SOB, rule out PE, COPD vs CHF exacerbation - V/Q scan pending. continue heparin 

drip protocol in the interim due to SOB and elevated Ddimer. ABG pending. trend 

troponin, ECHO pending. will consider continuing Lasix but no current signs of 

volume overload. 


SMITH on CKD, hypercalcemia - gentle fluid hydration, nephrology consulted 


COPD - breathing treatments and O2 PRN, IV steroids, pulmonary and respiratory 

therapy consulted


Hypothyroidism - stable, continue home medications 


Scleroderma - stable, continue home medications 


Raynaud's - stable, continue home medications 


RA - stable, continue home medications 





Discharge Plan: Home


Plan to discharge in: 48 Hours





- Advance Directives


Does patient have a Living Will: No


Does patient have a Durable POA for Healthcare: No





- Code Status/Comfort Care


Code Status Assessed: Yes (full code )


Critical Care: No


Time Spent Managing Pts Care (In Minutes): 70

## 2021-07-13 NOTE — P.PN
Subjective


Date of Service: 07/13/21


Chief Complaint: SOB


Patient states she is feeling jittery.  She denies increased shortness of 

breath.








Physical Examination





- Vital Signs


Temperature: 97.5 F


Blood Pressure: 151/77


Pulse: 74


Respirations: 20


Pulse Ox (%): 100





- Physical Exam


General: Alert, In no apparent distress, Oriented x3


HEENT: PERRLA, Mucous membr. moist/pink, EOMI, Sclerae nonicteric


Neck: Supple, JVD not distended


Respiratory: Clear to auscultation bilaterally, Diminished (Bilateral)


Cardiovascular: No edema, Regular rate/rhythm, Normal S1 S2


Gastrointestinal: Normal bowel sounds, Soft and benign, Non-distended, No 

tenderness


Musculoskeletal: No swelling, Tenderness (Right arm)


Integumentary: No rashes


Neurological: Other (No focal motor deficit.)





- Studies


Laboratory Data (last 24 hrs)





07/12/21 16:36: PT 12.8 H, INR 1.11, APTT 32.4


07/12/21 16:36: WBC 10.00  D, Hgb 12.6, Hct 40.0  D, Plt Count 310  D


07/12/21 16:36: Sodium 138, Potassium 4.5, BUN 32 H, Creatinine 2.36 H, Glucose 

93








Assessment And Plan





- Current Problems (Diagnosis)


(1) Acute respiratory failure with hypoxia


Current Visit: Yes   Status: Acute   





(2) Suspected pulmonary embolism


Current Visit: Yes   Status: Acute   





(3) Humeral fracture


Current Visit: No   Status: Acute   





(4) COPD exacerbation


Current Visit: Yes   Status: Acute   





(5) Rheumatoid arthritis


Current Visit: No   Status: Acute   





(6) Acute worsening of stage 3 chronic kidney disease


Current Visit: Yes   Status: Acute   





- Plan


Continue IV Solu-Medrol and scheduled bronchodilators for COPD exacerbation.


Case discussed with nephrology-Dr. Mary.  He agrees with low-dose Eliquis for 

PE.


Patient started on Eliquis 2.5 mg b.i.d.


Hold Lasix given SMITH.


No IV fluid.


Encouraged oral intake.


Monitor renal function.


Continue other home medications

## 2021-07-13 NOTE — CON
The patient is seen in Emergency Room 8, getting ready to go to the floor.



History Of Present Illness:  She is alert, awake and able to talk in full sentences with me, but she 
is clearly short of breath as well and is needing to take breaks on occasion, but she has good insigh
t.  No suicidal or homicidal ideation.  She states she has smoked in the past heavily, but has quit s
everal years ago.  Has a history of emphysema.  States that she was told long time ago she had some k
idney issues, but does not follow with a nephrologist.  Her creatinine is in the mid 2 range.  The pa
fredericnt states that she has had no issues with diabetes.  She denies any active issues with kidney or f
ollow up with the kidney doctor recently.  She states she follows up with a primary care physician.  
She states that she has significant shortness of breath, but does not require oxygen at home.  She st
ates that she has followed up with her primary care physicianfor the breathing issues as well.  She h
as a diagnosis of COPD she thinks.  She has a history of scleroderma and Raynaud phenomena, rheumatoi
d arthritis.  She has a main complaint of shortness of breath.  Right now, she says this is the reaso
n why she came in.  She has had some relief with albuterol inhalers for this.  She does not have any 
sputum production, but does have some mild cough.  She has some wheezing and orthopnea on my exam.  S
he has bilateral crackles and wheezing.  The wheezing does improve with cough.  She seems to not be v
olume overloaded, however, her lower extremities do not show any edema.  Her skin seems to be on the 
drier side.  She does not seem to have any crackles of the bases of her lungs.  She has already got a
 dose of Solu-Medrol.  Albuterol, ipratropium nebulizer treatments have been ordered.  Albuterol p.r.
n. has been ordered as well.



Home Medications:  Reviewed.



Allergies:  TO CODEINE AND SULFA.



Past Medical History:  Significant for hypertension, question chronic kidney disease, history of COPD
, hypothyroidism, scleroderma, question the status of her hypertension treatment and COPD treatment. 
 The patient does not follow up with any cardiologist or pulmonologist as per the patient or a nephro
logist.



Physical Examination:

Vital signs:  The patient's blood pressure is 137/77, her pulse is 70, respirations are 18, O2 sats a
re close to mid 90% to 100%, but the patient is getting 2 L of nasal cannula. 

Lungs:  Bilateral with significant rales and crackles and wheezing.  The wheezing does improve with c
ough.



Diagnostic Studies:  V/Q scan reviewed, shows question of intermediate probability for pulmonary embo
lism.  No large mismatched perfusion defects seen.  COPD is demonstrated.  No DVTs in lower extremiti
es on ultrasonography.  Her chest x-ray does not show any significant volume overload or any concern 
of congestive heart failure.



Laboratory Data:  Reviewed.  Hemoglobin 12.8, hematocrit 40.3, platelet count 278.  WBC 8.7, PTH of 1
14.7, calcium around 11.  BUN, creatinine 32/2.36 yesterday, today 39/2.43.  Sodium 138, potassium 4.
2, chloride 107.  Bicarb is around 28, was 19 yesterday.



Assessment And Plan:  The patient with chronic kidney disease, likely with some acute kidney injury o
n top.  At this point, denies any use of NSAIDs.  The patient's volume status seems to be close to eu
volemic.  There is some question of mismatch on V/Q scan, but there is no big mismatch and the probab
ility is low to intermediate.  Discussed with the hospitalist team as well.  Okay to use Eliquis 2.5 
mg p.o. b.i.d., which would be an adjustment given the patient's renal condition currently.  Her pamella
l failure has been stable.  Her renal numbers have been stable compared to yesterday with a creatinin
e of 2.36 last evening and 2.44 today.  The patient does seem to have some component of chronic kidne
y disease, which would need to be further evaluated once acute issues are resolved.  I have discussed
 this with the patient.  However, she looks euvolemic at this point.  We will discontinue IV fluids. 
 Recommend using treatment for chronic obstructive pulmonary disease.  May need some steroids to calm
 the lungs down further.  She has already got a dose of Solu-Medrol.  May also need some antibiotics,
 especially if she develops cough with sputum.  In summary, chronic obstructive pulmonary disease mirna
atment.  Discontinue IV fluids.  Encourage p.o. intake as tolerated.  Adequate water intake.  Avoid N
SAID.  Avoid dye.  The patient had a V/Q scan done with intermediate probability.  If this needs to b
e clarified further and CT scan is needed, as the patient's condition improves further, perhaps a CT 
scan with dye can be utilized with the understanding that there is some risk to the kidneys of worsen
ing renal failure.  At this point, Eliquis 2.5 mg p.o. b.i.d. can be continued.  Appreciate your cons
ultation.





MD/ISABEL

DD:  07/13/2021 12:34:27Voice ID:  [id number]

DT:  07/13/2021 14:47:18Report ID:  [id number]

## 2021-07-13 NOTE — EKG
Test Date:    2021-07-12               Test Time:    17:16:33

Technician:   HIEU                                    

                                                     

MEASUREMENT RESULTS:                                       

Intervals:                                           

Rate:         68                                     

FL:           210                                    

QRSD:         94                                     

QT:           438                                    

QTc:          465                                    

Axis:                                                

P:            72                                     

FL:           210                                    

QRS:          -39                                    

T:            69                                     

                                                     

INTERPRETIVE STATEMENTS:                                       

                                                     

Sinus rhythm with 1st degree AV block

Left axis deviation

Voltage criteria for left ventricular hypertrophy

Abnormal ECG

Compared to ECG 06/30/2021 06:52:12

No significant changes



Electronically Signed On 07-13-21 19:13:49 CDT by Wicho Arora

## 2021-07-14 VITALS — TEMPERATURE: 97.8 F | DIASTOLIC BLOOD PRESSURE: 64 MMHG | SYSTOLIC BLOOD PRESSURE: 136 MMHG

## 2021-07-14 LAB
BUN BLD-MCNC: 47 MG/DL (ref 7–18)
GLUCOSE SERPLBLD-MCNC: 125 MG/DL (ref 74–106)
HCT VFR BLD CALC: 34.8 % (ref 36–45)
LYMPHOCYTES # SPEC AUTO: 0.8 K/UL (ref 0.7–4.9)
PMV BLD: 8.2 FL (ref 7.6–11.3)
POTASSIUM SERPL-SCNC: 4.4 MMOL/L (ref 3.5–5.1)
RBC # BLD: 3.9 M/UL (ref 3.86–4.86)

## 2021-07-14 RX ADMIN — ACETAMINOPHEN PRN MG: 500 TABLET, FILM COATED ORAL at 01:08

## 2021-07-14 RX ADMIN — ACETAMINOPHEN PRN MG: 500 TABLET, FILM COATED ORAL at 05:32

## 2021-07-14 RX ADMIN — IPRATROPIUM BROMIDE SCH MG: 0.5 SOLUTION RESPIRATORY (INHALATION) at 01:25

## 2021-07-14 RX ADMIN — APIXABAN SCH MG: 2.5 TABLET, FILM COATED ORAL at 20:24

## 2021-07-14 RX ADMIN — ALBUTEROL SULFATE SCH MG: 2.5 SOLUTION RESPIRATORY (INHALATION) at 20:05

## 2021-07-14 RX ADMIN — ACETAMINOPHEN PRN MG: 500 TABLET, FILM COATED ORAL at 17:10

## 2021-07-14 RX ADMIN — APIXABAN SCH MG: 2.5 TABLET, FILM COATED ORAL at 09:00

## 2021-07-14 RX ADMIN — AMITRIPTYLINE HYDROCHLORIDE SCH: 50 TABLET, FILM COATED ORAL at 09:00

## 2021-07-14 RX ADMIN — IPRATROPIUM BROMIDE SCH MG: 0.5 SOLUTION RESPIRATORY (INHALATION) at 07:45

## 2021-07-14 RX ADMIN — IPRATROPIUM BROMIDE SCH MG: 0.5 SOLUTION RESPIRATORY (INHALATION) at 20:05

## 2021-07-14 RX ADMIN — METHYLPREDNISOLONE SODIUM SUCCINATE SCH MG: 40 INJECTION, POWDER, FOR SOLUTION INTRAMUSCULAR; INTRAVENOUS at 00:19

## 2021-07-14 RX ADMIN — ACETAMINOPHEN PRN MG: 500 TABLET, FILM COATED ORAL at 11:13

## 2021-07-14 RX ADMIN — METHYLPREDNISOLONE SODIUM SUCCINATE SCH MG: 40 INJECTION, POWDER, FOR SOLUTION INTRAMUSCULAR; INTRAVENOUS at 17:10

## 2021-07-14 RX ADMIN — ALBUTEROL SULFATE SCH MG: 2.5 SOLUTION RESPIRATORY (INHALATION) at 13:33

## 2021-07-14 RX ADMIN — Medication SCH: at 20:24

## 2021-07-14 RX ADMIN — IPRATROPIUM BROMIDE SCH MG: 0.5 SOLUTION RESPIRATORY (INHALATION) at 13:33

## 2021-07-14 RX ADMIN — ALBUTEROL SULFATE SCH MG: 2.5 SOLUTION RESPIRATORY (INHALATION) at 01:25

## 2021-07-14 RX ADMIN — METHYLPREDNISOLONE SODIUM SUCCINATE SCH MG: 40 INJECTION, POWDER, FOR SOLUTION INTRAMUSCULAR; INTRAVENOUS at 11:04

## 2021-07-14 RX ADMIN — Medication SCH ML: at 09:00

## 2021-07-14 RX ADMIN — ALBUTEROL SULFATE SCH: 2.5 SOLUTION RESPIRATORY (INHALATION) at 08:00

## 2021-07-14 NOTE — P.PN
Date of Service: 07/14/21


Vital Signs











Temp Pulse Resp BP Pulse Ox


 


 97.6 F   68   16   123/56 L  98 


 


 07/14/21 07:40  07/14/21 07:40  07/14/21 07:40  07/14/21 07:40  07/14/21 07:40





Medications





Acetaminophen (Acetaminophen 500 Mg Tab)  500 mg PO Q4HP PRN


   PRN Reason: Pain scale 2-4 (Mild)


   Last Admin: 07/14/21 05:32 Dose:  500 mg


   Documented by: 


Albuterol Sulfate (Albuterol 2.5 Mg/3 Ml Neb Sol)  2.5 mg NEB Q4HP PRN


   PRN Reason: SHORTNESS OF BREATH


Albuterol Sulfate (Albuterol 2.5 Mg/3 Ml Neb Sol)  2.5 mg NEB [id number] [facility]


   Last Admin: 07/14/21 08:00 Dose:  Not Given


   Documented by: 


Alprazolam (Alprazolam 0.25 Mg Tablet)  0.25 mg PO DAILY PRN


   PRN Reason: ANXIETY


Amitriptyline HCl (Amitriptyline 50 Mg Tab)  50 mg PO BID [facility]


   Last Admin: 07/13/21 20:57 Dose:  Not Given


   Documented by: 


Apixaban (Apixaban 2.5 Mg Tablet)  2.5 mg PO BID [facility]


   Last Admin: 07/13/21 20:56 Dose:  2.5 mg


   Documented by: 


Ipratropium Bromide (Ipratropium Brom 0.5mg/2.5ml)  0.5 mg NEB [id number] [facility]


   Last Admin: 07/14/21 07:45 Dose:  0.5 mg


   Documented by: 


Levothyroxine Sodium (Levothyroxine Sod 0.025 Mg Tab)  0.025 mg PO DAILYAC [facility]


   Last Admin: 07/14/21 05:32 Dose:  0.025 mg


   Documented by: 


Methylprednisolone Sodium Succinate (Methylprednisolone 40 Mg Inj)  40 mg IV 

Q8HR [facility]


   Last Admin: 07/14/21 00:19 Dose:  40 mg


   Documented by: 


Ondansetron HCl (Ondansetron 4 Mg/2 Ml Vial)  4 mg IV Q6HP PRN


   PRN Reason: NAUSEA / VOMITING


Sodium Chloride (Flush Normal Saline 10 Ml)  10 ml IV BID [facility]


   Last Admin: 07/13/21 20:57 Dose:  10 ml


   Documented by: 


Sulfasalazine (Sulfasalazine 500 Mg E.C. Tab)  500 mg PO DAILY [facility]


Microbiology Results





07/12/21 16:24   Blood  - Blood   Aerobic Blood Culture - Preliminary


                            No growth in 24 hours.


07/12/21 16:24   Blood  - Blood   Anaerobic Blood Culture - Preliminary


                            No growth in 24 hours.


07/12/21 16:36   Blood  - Blood   Aerobic Blood Culture - Preliminary


                            No growth in 24 hours.


07/12/21 16:36   Blood  - Blood   Anaerobic Blood Culture - Preliminary


                            No growth in 24 hours.





Assessment/ Plan: 





Nephrology





Feeling better today.


CPS stable without CP or SOB.


No acute events overnight





Vitals, medications, blood work and imaging reviewed in the chart.


NAD.  NCAT.  MMM.  Neck supple.  CTA.  RRR.  Soft Abd.  No C/C/E.  No rash.  

AAO.  Normal speech.





SEVERE DIASTOLIC DYSFUNCTION, GRADE III. LEFT VENTRICULAR HYPERTROPHY. NORMAL 

LEFT VENTRICULAR EJECTION FRACTION. NO EFFUSION.








SMITH vs CKD progression


CKD IV


-No NSAIDs





Acidosis


-Oral bicarb as needed





HTN with CKD


-Monitor BP





Diastolic CHF


-Low sodium diet





Hyperglycemia


-Wean steroids a tolerated





Anemia in chronic illness


-Monitor H&H





Secondary HyperPTH


Hypercalcemia


-Start Vitamin D3





Acute hypoxic respiratory failure


Suspected PE


-Continue Eliquis





Case reviewed with Dr. Maxwell

## 2021-07-14 NOTE — RAD REPORT
EXAM DESCRIPTION:  RAD - Shoulder  Right 2 View - 7/14/2021 2:26 pm

 

CLINICAL HISTORY:  Right shoulder pain

 

FINDINGS:  There has been minimal worsening in the displacement of the right humeral neck fracture wh
ich is mild-to-moderate.

 

No dislocation is seen

## 2021-07-14 NOTE — RAD REPORT
EXAM DESCRIPTION:  RAD - Elbow Left 3 View - 7/14/2021 2:26 pm

 

CLINICAL HISTORY:  Left elbow pain

 

FINDINGS:  No fracture or dislocation is seen.Bones are osteoporotic.

 

No additional bone or joint abnormality seen

## 2021-07-14 NOTE — RAD REPORT
EXAM DESCRIPTION:  RAD - Humerus Left - 7/14/2021 2:26 pm

 

CLINICAL HISTORY:   Left arm pain

 

FINDINGS:   No fracture is seen.

 

Bones are osteoporotic. No bony lesions seen

## 2021-07-14 NOTE — ECHO
HEIGHT: 5 ft 6 in   WEIGHT: 149 lb 9.6 oz   DATE OF STUDY: 07/13/2021   REFER DR: Koby Bryant

2-DIMENSIONAL: YES

     M.MODE: YES

 DOPPLER: YES

COLOR FLOW: YES



                    TDS:  NO

PORTABLE: NO

 DEFINITY:  NO

BUBBLE STUDY: NO





DIAGNOSIS:  SHORTNESS OF BREATH



CARDIAC HISTORY:  

CATHERIZATION: 

SURGERY: 

PROSTHETIC VALVE: 

PACEMAKER: 





MEASUREMENTS (cm)

    DIASTOLIC (NORMALS)                 SYSTOLIC (NORMALS)

IVSd                 1.7 (0.6-1.2)                    LA Diam 3.4 (1.9-4.0)     LVEF       
  73%  

LVIDd               3.3 (3.5-5.7)                        LVIDs      2.0 (2.0-3.5)     %FS  
        41%

LVPWd             1.4 (0.6-1.2)

Ao Diam           2.7 (2.0-3.7)



2 DIMENSIONAL ASSESSMENT:

RIGHT ATRIUM:                   NORMAL

LEFT ATRIUM:       NORMAL



RIGHT VENTRICLE:            NORMAL

LEFT VENTRICLE: LEFT VENTRICULAR HYPERTROPHY



TRICUSPID VALVE:             NORMAL

MITRAL VALVE:     NORMAL



PULMONIC VALVE:             NORMAL

AORTIC VALVE:     NORMAL



PERICARDIAL EFFUSION: NONE

AORTIC ROOT:      NORMAL





LEFT VENTRICULAR WALL MOTION:     DIASTOLIC DYSFUNCTION.



DOPPLER/COLOR FLOW:     DIASTOLIC DYSFUNCTION.



COMMENTS:      SEVERE DIASTOLIC DYSFUNCTION, GRADE III. LEFT VENTRICULAR HYPERTROPHY. 
NORMAL LEFT VENTRICULAR EJECTION FRACTION. NO EFFUSION.



TECHNOLOGIST:   OSCAR LEVINE

## 2021-07-14 NOTE — P.DS
Admission Date: 07/12/21


Discharge Date: 07/14/21


Disposition: ROUTINE DISCHARGE


Discharge Condition: FAIR


Reason for Admission: SOB





- Problems


(1) Acute respiratory failure with hypoxia


Current Visit: Yes   Status: Acute   





(2) Suspected pulmonary embolism


Current Visit: Yes   Status: Acute   





(3) Humeral fracture


Current Visit: No   Status: Acute   





(4) COPD exacerbation


Current Visit: Yes   Status: Acute   





(5) Rheumatoid arthritis


Current Visit: No   Status: Acute   





(6) Acute worsening of stage 3 chronic kidney disease


Current Visit: Yes   Status: Acute   


Brief History of Present Illness: 


70 yo F with RA, Raynaud's, scleoderma, hypothyroidism, and COPD presented to 

the emergency department with a complaint of SOB that has been worsening over 4 

weeks.  Patient reported worsening symptoms with anxiety.  She was sent to ED by

PCP for rule out of PE.  Cold not do CTPE due to impaired kidney function. CT 

w/o contrast showed extensive fribro emphysematous lung pattern with no focal 

consolidation or mass. Quit smoking 10 years ago. BUN 32, Cr 2.36, GFR 20. Ca2+ 

12.1. Trop 0.08, BNP 6151. EKG wnl. Ddimer 4178. Received lasix, zoponex, 

ativan, and solumedrol in the ED.  Paient admitted for further evaluation and 

treatment. 





Hospital Course: 


V/Q scan performed showed low to moderate probability for pulmonary embolus.  D-

dimer elevated.  Patient started on renally dosed Eliquis given her impaired 

renal function.  Patient also treated for COPD exacerbation with IV steroid, 

scheduled bronchodilators.  No pneumonia and no indication for antibiotic.  She 

was initially requiring oxygen but was eventually weaned off oxygen to room when

which she tolerated liquid oxygen saturation.  Patient is ambulatory without 

shortness of breath.  CT chest shows emphysema.  Patient diagnosed with COPD 

exacerbation.  She is discharged with Advair, nebs and short course prednisone 

therapy.  She may resume her Xanax for anxiety.  Repeat x-ray of the right 

humerus shows mild worsening displacement of the humeral shaft fracture.  

Patient advised to wear her sling all the time.





Vital Signs/Physical Exam: 














Temp Pulse Resp BP Pulse Ox


 


 98.6 F   67   16   122/56 L  99 


 


 07/14/21 12:00  07/14/21 12:00  07/14/21 12:00  07/14/21 12:00  07/14/21 12:00








General: Alert, In no apparent distress, Oriented x3


HEENT: Mucous membr. moist/pink


Neck: JVD not distended


Respiratory: Clear to auscultation bilaterally, Normal air movement


Cardiovascular: No edema, Regular rate/rhythm, Normal S1 S2


Gastrointestinal: Normal bowel sounds, Soft and benign, Non-distended


Musculoskeletal: No contractures


Integumentary: No rashes


Neurological: Normal strength at 5/5 x4 extr


Laboratory Data at Discharge: 














WBC  13.90 K/uL (4.3-10.9)  H D 07/14/21  05:15    


 


Hgb  11.1 g/dL (12.0-15.0)  L  07/14/21  05:15    


 


Hct  34.8 % (36.0-45.0)  L  07/14/21  05:15    


 


Plt Count  234 K/uL (152-406)   07/14/21  05:15    


 


PT  12.8 SECONDS (9.5-12.5)  H  07/12/21  16:36    


 


INR  1.11   07/12/21  16:36    


 


APTT  32.4 SECONDS (24.3-36.9)   07/12/21  16:36    


 


Sodium  138 mmol/L (136-145)   07/14/21  05:15    


 


Potassium  4.4 mmol/L (3.5-5.1)   07/14/21  05:15    


 


BUN  47 mg/dL (7-18)  H  07/14/21  05:15    


 


Creatinine  2.30 mg/dL (0.55-1.3)  H  07/14/21  05:15    


 


Glucose  125 mg/dL ()  H  07/14/21  05:15    


 


Phosphorus  4.5 mg/dL (2.5-4.9)   07/13/21  05:22    


 


Magnesium  2.0 mg/dL (1.8-2.4)   07/13/21  05:22    


 


Total Bilirubin  0.7 mg/dL (0.2-1.0)   07/13/21  05:22    


 


AST  16 U/L (15-37)   07/13/21  05:22    


 


ALT  17 U/L (12-78)   07/13/21  05:22    


 


Alkaline Phosphatase  103 U/L ()   07/13/21  05:22    


 


Troponin I  0.09 ng/mL (0.0-0.045)  H  07/13/21  14:30    


 


Triglycerides  136 mg/dL (<150)   07/13/21  05:22    


 


Cholesterol  187 mg/dL (<200)   07/13/21  05:22    


 


HDL Cholesterol  47 mg/dL (40-60)   07/13/21  05:22    


 


Cholesterol/HDL Ratio  3.98   07/13/21  05:22    








Home Medications: 








Levothyroxine Sodium [Levothyroxine] 25 mcg PO DAILY 07/01/21 


sulfaSALAzine [Sulfasalazine] 500 mg PO DAILY 07/01/21 


traMADol HCL [Ultram*] 50 mg PO Q6H PRN #20 tab 07/04/21 


Albuterol Neb [Proventil 0.083% Neb Soln] 2.5 mg NEB [id number] #120 amp 07/14/21 


Amitriptyline [Elavil] 25 mg PO BEDTIME #30 tab 07/14/21 


Apixaban [Eliquis *] 2.5 mg PO BID #60 tablet 07/14/21 


Cholecalciferol (Vitamin D3) [Vitamin D 5,000 IU Cap*] 5,000 unit PO DAILY #30 

cap 07/14/21 


Fluticasone/Salmeterol [Advair 250-50 Diskus] 1 each IH BID #60 blst.w.dev 

07/14/21 


Ipratropium Neb [Atrovent*] 0.5 mg NEB [id number] #120 amp 07/14/21 


Nebulizer [Truneb Nebulizer] 1 each  Q6H #1 each 07/14/21 


predniSONE [Deltasone] 40 mg PO DAILY #10 tab 07/14/21 





New Medications: 


Ipratropium Neb [Atrovent*] 0.5 mg NEB [id number] #120 amp


Albuterol Neb [Proventil 0.083% Neb Soln] 2.5 mg NEB [id number] #120 amp


Fluticasone/Salmeterol [Advair 250-50 Diskus] 1 each IH BID #60 blst.w.dev


Amitriptyline [Elavil] 25 mg PO BEDTIME #30 tab


Apixaban [Eliquis *] 2.5 mg PO BID #60 tablet


predniSONE [Deltasone] 40 mg PO DAILY #10 tab


Nebulizer [Truneb Nebulizer] 1 each MC Q6H #1 each


Cholecalciferol (Vitamin D3) [Vitamin D 5,000 IU Cap*] 5,000 unit PO DAILY #30 

cap


Physician Discharge Instructions: 


Keep right upper extremity in a sling all the time.


Diet: AHA


Activity: Ad neo


Followup: 


Fred Mari MD [Primary Care Provider] - 1-2 Weeks


Time spent managing pt's care (in minutes): 38

## 2021-07-15 VITALS — OXYGEN SATURATION: 92 %

## 2021-09-29 ENCOUNTER — HOSPITAL ENCOUNTER (EMERGENCY)
Dept: HOSPITAL 97 - ER | Age: 71
Discharge: ANOTHER HOSPITAL | End: 2021-09-29
Payer: COMMERCIAL

## 2021-09-29 VITALS — SYSTOLIC BLOOD PRESSURE: 119 MMHG | OXYGEN SATURATION: 96 % | DIASTOLIC BLOOD PRESSURE: 96 MMHG | TEMPERATURE: 98.7 F

## 2021-09-29 DIAGNOSIS — E03.9: ICD-10-CM

## 2021-09-29 DIAGNOSIS — N20.1: Primary | ICD-10-CM

## 2021-09-29 DIAGNOSIS — Z88.2: ICD-10-CM

## 2021-09-29 DIAGNOSIS — Z88.5: ICD-10-CM

## 2021-09-29 DIAGNOSIS — Z20.822: ICD-10-CM

## 2021-09-29 DIAGNOSIS — N39.0: ICD-10-CM

## 2021-09-29 DIAGNOSIS — J44.9: ICD-10-CM

## 2021-09-29 DIAGNOSIS — Z79.01: ICD-10-CM

## 2021-09-29 LAB
ALBUMIN SERPL BCP-MCNC: 3.6 G/DL (ref 3.4–5)
ALP SERPL-CCNC: 78 U/L (ref 45–117)
ALT SERPL W P-5'-P-CCNC: 11 U/L (ref 12–78)
AST SERPL W P-5'-P-CCNC: 9 U/L (ref 15–37)
BUN BLD-MCNC: 19 MG/DL (ref 7–18)
GLUCOSE SERPLBLD-MCNC: 90 MG/DL (ref 74–106)
HCT VFR BLD CALC: 34.8 % (ref 36–45)
LIPASE SERPL-CCNC: 64 U/L (ref 73–393)
LYMPHOCYTES # SPEC AUTO: 0.8 K/UL (ref 0.7–4.9)
PMV BLD: 7.3 FL (ref 7.6–11.3)
POTASSIUM SERPL-SCNC: 4.6 MMOL/L (ref 3.5–5.1)
RBC # BLD: 3.59 M/UL (ref 3.86–4.86)

## 2021-09-29 PROCEDURE — 80076 HEPATIC FUNCTION PANEL: CPT

## 2021-09-29 PROCEDURE — 51702 INSERT TEMP BLADDER CATH: CPT

## 2021-09-29 PROCEDURE — 85025 COMPLETE CBC W/AUTO DIFF WBC: CPT

## 2021-09-29 PROCEDURE — 36415 COLL VENOUS BLD VENIPUNCTURE: CPT

## 2021-09-29 PROCEDURE — 87088 URINE BACTERIA CULTURE: CPT

## 2021-09-29 PROCEDURE — 96375 TX/PRO/DX INJ NEW DRUG ADDON: CPT

## 2021-09-29 PROCEDURE — 96372 THER/PROPH/DIAG INJ SC/IM: CPT

## 2021-09-29 PROCEDURE — 87077 CULTURE AEROBIC IDENTIFY: CPT

## 2021-09-29 PROCEDURE — 81003 URINALYSIS AUTO W/O SCOPE: CPT

## 2021-09-29 PROCEDURE — 93005 ELECTROCARDIOGRAM TRACING: CPT

## 2021-09-29 PROCEDURE — 87186 SC STD MICRODIL/AGAR DIL: CPT

## 2021-09-29 PROCEDURE — 87086 URINE CULTURE/COLONY COUNT: CPT

## 2021-09-29 PROCEDURE — 96374 THER/PROPH/DIAG INJ IV PUSH: CPT

## 2021-09-29 PROCEDURE — 83690 ASSAY OF LIPASE: CPT

## 2021-09-29 PROCEDURE — 74176 CT ABD & PELVIS W/O CONTRAST: CPT

## 2021-09-29 PROCEDURE — 80048 BASIC METABOLIC PNL TOTAL CA: CPT

## 2021-09-29 PROCEDURE — 99285 EMERGENCY DEPT VISIT HI MDM: CPT

## 2021-09-29 PROCEDURE — 74018 RADEX ABDOMEN 1 VIEW: CPT

## 2021-09-29 NOTE — EDPHYS
Physician Documentation                                                                           

 [facility]                                                                 

Name: Yarelis Maurice                                                                                

Age: 71 yrs                                                                                       

Sex: Female                                                                                       

: 1950                                                                                   

MRN: [id number]                                                                                   

Arrival Date: 2021                                                                          

Time: 11:39                                                                                       

Account#: [id number]                                                                            

Bed 24                                                                                            

Private MD:                                                                                       

ED Physician Mike Mya                                                                       

HPI:                                                                                              

                                                                                             

12:14 This 71 yrs old  Female presents to ER via Wheelchair with complaints of       [facility] 

      Abdominal Pain.                                                                             

12:14 The patient presents with abdominal pain. Onset: The symptoms/episode began/occurred    jmm 

      gradually, 3 month(s) ago. The symptoms do not radiate. Associated signs and symptoms:      

      Pertinent negatives: diarrhea, vomiting. The symptoms are described as achy, sharp.         

      Modifying factors: The symptoms are alleviated by nothing, the symptoms are aggravated      

      by nothing. The patient has not experienced similar symptoms in the past. This is a         

      71-year-old female with history of COPD and atrophy of the left kidney that presents to     

      the ED with complaints of right sided abdominal pain which has been chronic over the        

      past 3 months but intensified severely this morning. Patient denies vomiting, fever,        

      diarrhea..                                                                                  

                                                                                                  

Historical:                                                                                       

- Allergies:                                                                                      

11:48 Sulfa (Sulfonamide Antibiotics);                                                        tw2 

11:48 Codeine; nausea;                                                                        tw2 

- Home Meds:                                                                                      

11:48 VItamin D \T\ E [Active]; sulfasalazine 500 mg Oral tab 2 tab after meals [Active];       tw2

      omeprazole 40 mg oral cpDR 1 cap once daily [Active]; levothyroxine 25 mcg tab 1.5 tabs     

      once daily [Active]; Eliquis 2.5 mg oral tab 1 tab 2 times per day [Active];                

      orphenadrine citrate 100 mg oral TbER 1 tab 2 times per day [Active]; amitriptyline 25      

      mg oral tab 1 tab once daily [Active]; Symbicort 160-4.5 mcg/actuation inhalation HFAA      

      2 puffs 2 times per day [Active]; albuterol sulfate 90 mcg/actuation Inhl aebs 2 puffs      

      every 4-6 hours [Active];                                                                   

- PMHx:                                                                                           

11:48 born with 1 kidney; COPD; Hypothyroidism; Osteoporosis; Raynaud's Disease; Rheumatoid   tw2 

      Arthritis; Scleroderma;                                                                     

- PSHx:                                                                                           

11:48 hysterectomy;                                                                           tw2 

                                                                                                  

- Immunization history:: Client reports receiving the 2nd dose of the Covid vaccine,              

  Flu vaccine is up to date.                                                                      

- Social history:: Smoking status: Patient denies any tobacco usage or history of.                

                                                                                                  

                                                                                                  

ROS:                                                                                              

12:14 Constitutional: Negative for fever, chills, and weight loss, Cardiovascular: Negative   jmm 

      for chest pain, palpitations, and edema, Respiratory: Negative for shortness of breath,     

      cough, wheezing, and pleuritic chest pain.                                                  

12:14 Abdomen/GI: Positive for abdominal pain.                                                    

12:14 All other systems are negative.                                                             

                                                                                                  

Exam:                                                                                             

12:14 Constitutional:  This is a well developed, well nourished patient who is awake, alert,  jmm 

      and in no acute distress. Head/Face:  atraumatic. Eyes:  EOMI, no conjunctival erythema     

      appreciated ENT:  Moist Mucus Membranes Neck:  Trachea midline, Supple Chest/axilla:        

      Normal chest wall appearance and motion.   Cardiovascular:  Regular rate and rhythm.        

      No edema appreciated Respiratory:  Normal respirations, no respiratory distress             

      appreciated                                                                                 

12:14 Back:  Normal ROM Skin:  General appearance color normal MS/ Extremity:  Moves all          

      extremities, no obvious deformities appreciated, no edema noted to the lower                

      extremities  Neuro:  Awake and alert, normal gait Psych:  Behavior is normal, Mood is       

      normal, Patient is cooperative and pleasant                                                 

12:14 Abdomen/GI: Inspection: abdomen appears normal, Bowel sounds: normal, Palpation: soft,      

      moderate abdominal tenderness, in all quadrants.                                            

                                                                                                  

Vital Signs:                                                                                      

11:43  / 70; Pulse 73; Resp 17; Temp 97.7(TE); Pulse Ox 97% on R/A; Weight 62.6 kg (R); tw2 

      Height 5 ft. 4 in. (162.56 cm); Pain 10/10;                                                 

13:19  / 92; Pulse 81; Resp 19; Pulse Ox 98% ;                                          oh  

17:33  / 96; Pulse 90; Resp 20; Temp 98.7; Pulse Ox 96% on R/A;                         oh  

11:43 Body Mass Index 23.69 (62.60 kg, 162.56 cm)                                             tw2 

                                                                                                  

MDM:                                                                                              

12:14 Patient medically screened.                                                             [facility] 

16:30 Data reviewed: vital signs, nurses notes.                                               [facility] 

16:51 Counseling: I had a detailed discussion with the patient and/or guardian regarding: the [facility] 

      historical points, exam findings, and any diagnostic results supporting the                 

      discharge/admit diagnosis, lab results, radiology results, the need to transfer to          

      another facility. ED course: No urology available. Due to UTI, intractable pain, and        

      having only one functional kidney, will transfer for further evaluation. .                  

17:38 ED course: i discussed the patient Dr. Aguillon.                                            [facility] 

17:51 ED course: I discussed the case with Dr. Sanchez from [facility] in the [facility]. kdr 

      He agreed to consult on the case. The hospitalist had already accepted the case and         

      transfer.                                                                                   

                                                                                                  

                                                                                             

12:19 Order name: Basic Metabolic Panel                                                       [facility] 

                                                                                             

12:19 Order name: CBC with Diff; Complete Time: 12:55                                         [facility] 

                                                                                             

12:19 Order name: Hepatic Function; Complete Time: 13:19                                      [facility] 

                                                                                             

12:19 Order name: Lipase; Complete Time: 13:19                                                [facility] 

                                                                                             

12:19 Order name: Basic Metabolic Panel; Complete Time: 13:19                                 [facility]

                                                                                             

15:46 Order name: Urine Dipstick-Ancillary; Complete Time: 16:05                              [facility]

                                                                                             

13:20 Order name: CT Abd/Pelvis - Without Contrast; Complete Time: 14:10                      [facility] 

                                                                                             

16:05 Order name: Abdomen 1 View (KUB) XRAY; Complete Time: 17:14                             [facility] 

                                                                                             

16:07 Order name: Urine Culture                                                               [facility] 

                                                                                             

16:07 Order name: Urine Culture                                                               [facility]

                                                                                             

16:24 Order name: COVID-19 : Document "Date of Symptom Onset" if Symptomatic.                   

                                                                                             

19:13 Order name: SARS-COV-2 RT PCR; Complete Time: 11:53                                     [facility]

                                                                                             

12:19 Order name: IV Saline Lock; Complete Time: 12:40                                        [facility] 

                                                                                             

12:19 Order name: Labs collected and sent; Complete Time: 12:40                               [facility] 

                                                                                             

12:19 Order name: Urine Dipstick-Ancillary (obtain specimen); Complete Time: 16:41            [facility] 

                                                                                             

16:03 Order name: Phillips; Complete Time: 16:14                                                 vg1 

                                                                                                  

Administered Medications:                                                                         

12:28 Drug: morphine 4 mg Route: IM; Site: left deltoid;                                      oh  

17:38 Follow up: Response: No adverse reaction                                                oh  

12:28 Drug: Zofran (Ondansetron) 4 mg Route: PO;                                              oh  

12:59 Drug: diphenhydrAMINE 12.5 mg Route: IVP; Site: left antecubital;                       oh  

17:38 Follow up: Response: No adverse reaction                                                oh  

13:31 Drug: fentaNYL (PF) 50 mcg Route: IVP; Site: left antecubital;                          oh  

17:38 Follow up: Response: No adverse reaction                                                oh  

15:20 Drug: Flomax (tamsulosin) 0.4 mg Route: PO;                                             oh  

17:38 Follow up: Response: No adverse reaction                                                oh  

15:23 Drug: NS 0.9% 250 ml Route: IV; Rate: bolus; Site: left antecubital;                    oh  

15:24 Drug: Zofran (Ondansetron) 4 mg Route: IVP; Site: left antecubital;                     oh  

17:38 Follow up: Response: No adverse reaction                                                oh  

16:18 Drug: Rocephin (cefTRIAXone) 1 grams Route: IV; Rate: calculated rate; Site: left       oh  

      antecubital;                                                                                

17:49 Drug: Rocephin (cefTRIAXone) 1 grams Route: IV; Rate: calculated rate; Site: left       oh  

      antecubital;                                                                                

                                                                                                  

                                                                                                  

Disposition:                                                                                      

17:51 Co-signature as Attending Physician, Mike May MD I agree with the assessment and   kdr 

      plan of care.                                                                               

                                                                                                  

Disposition Summary:                                                                              

21 16:53                                                                                    

Transfer Ordered                                                                                  

      Transfer Location: [facility] 

      Reason: Higher level of care                                                            jmm 

      Condition: Stable                                                                       jmm 

      Problem: new                                                                            jmm 

      Symptoms: are unchanged                                                                 jmm 

      Accepting Physician: Dr. Aguillon(21 20:27)                                           cw2 

      Diagnosis                                                                                   

        - Calculus of ureter                                                                  jmm 

        - Urinary Tract Infection                                                             jmm 

      Forms:                                                                                      

        - Medication Reconciliation Form                                                      jmm 

        - SBAR form                                                                           jmm 

Signatures:                                                                                       

Dispatcher [employer]                           EDMS                                                 

Mike May MD MD kdr Mickail, Joel, PA                       PA   jmm                                                  

Pippa Guerra RN                          RN   tw2                                                  

Jane Marie, RN                    RN   vg1                                                  

Chris York, RN               RN   cw2                                                  

Jordan Mcbride RN                     RN   oh                                                   

                                                                                                  

Corrections: (The following items were deleted from the chart)                                    

11:50 11:48 Allergies: Sulfa; tw2                                                             tw2 

13:22 12:20 Abdomen Pelvis W Con+CT.RAD.BRZ ordered. EDMS                                     EDMS

17:39 16:53 Urology jmm                                                                       jmm 

18:16 16:25 CORONAVIRUS ordered. EDMS                                                         EDMS

20:27 17:39 Dr. Henna sharpe                                                                      cw2 

                                                                                                  

**************************************************************************************************

## 2021-09-29 NOTE — ER
Nurse's Notes                                                                                     

 [facility]                                                                 

Name: Yarelis Maurice                                                                                

Age: 71 yrs                                                                                       

Sex: Female                                                                                       

: 1950                                                                                   

MRN: [id number]                                                                                   

Arrival Date: 2021                                                                          

Time: 11:39                                                                                       

Account#: [id number]                                                                            

Bed 24                                                                                            

Private MD:                                                                                       

Diagnosis: Calculus of ureter;Urinary Tract Infection                                             

                                                                                                  

Presentation:                                                                                     

                                                                                             

11:43 Chief complaint: Patient states: i am having pain. its in my right side of my stomach   tw2 

      and it has been going on for a long time. the pain just got worse and today the pain        

      was bad on her side. Chief complaint: Patient states: i fell . Spouse and/or       

      significant other states: she broke her right upper arm. Chief complaint: Spouse and/or     

      significant other states: she has no tolerance for pain. the past couple of months          

      since she broke her arm she has been totally sedentary. she lays in bed or sits in          

      recliner. she eats or goes to the bathroom. and several occasions over the past couple      

      of months where maybe she gets trapped gas and she appears to have what is severe pain.     

      i suspect this is what is going on today but her reaction is so bad that i just dont        

      know. i called Dr. Mari's office and i listened to the recording. Coronavirus screen:      

      At this time, the client does not indicate any symptoms associated with coronavirus-19.     

      Ebola Screen: Patient denies travel to an Ebola-affected area in the 21 days before         

      illness onset. Initial Sepsis Screen: Does the patient meet any 2 criteria? No.             

      Patient's initial sepsis screen is negative. Does the patient have a suspected source       

      of infection? No. Patient's initial sepsis screen is negative. Risk Assessment: Do you      

      want to hurt yourself or someone else? Patient reports no desire to harm self or            

      others. Onset of symptoms was 2021.                                           

11:43 Method Of Arrival: Wheelchair                                                           tw2 

11:43 Acuity: VALERIE 3                                                                           tw2 

20:24 Note ATTEMPT TO CALL REPORT MULTIPLE TIMES BY DAY SHIFT AND NIGHT SHIFT. NO ANSWER ON   cw2 

      NIGHT SHIFT FOR REPORT. EMS BEDSIDE TO TAKE PT TO [facility]. PT AWAKE ALERT AOX4            

      SPEAKING IN FULL SENTENCES. PT STABLE FOR TRANSPORT.                                        

                                                                                                  

Triage Assessment:                                                                                

11:47 General: Appears in no apparent distress. uncomfortable, Behavior is anxious. Pain:     tw2 

      Complains of pain in abdomen. GI: Reports lower abdominal pain, upper abdominal pain,       

      LBM today.                                                                                  

                                                                                                  

Historical:                                                                                       

- Allergies:                                                                                      

11:48 Sulfa (Sulfonamide Antibiotics);                                                        tw2 

11:48 Codeine; nausea;                                                                        tw2 

- Home Meds:                                                                                      

11:48 VItamin D \T\ E [Active]; sulfasalazine 500 mg Oral tab 2 tab after meals [Active];       tw2

      omeprazole 40 mg oral cpDR 1 cap once daily [Active]; levothyroxine 25 mcg tab 1.5 tabs     

      once daily [Active]; Eliquis 2.5 mg oral tab 1 tab 2 times per day [Active];                

      orphenadrine citrate 100 mg oral TbER 1 tab 2 times per day [Active]; amitriptyline 25      

      mg oral tab 1 tab once daily [Active]; Symbicort 160-4.5 mcg/actuation inhalation HFAA      

      2 puffs 2 times per day [Active]; albuterol sulfate 90 mcg/actuation Inhl aebs 2 puffs      

      every 4-6 hours [Active];                                                                   

- PMHx:                                                                                           

11:48 born with 1 kidney; COPD; Hypothyroidism; Osteoporosis; Raynaud's Disease; Rheumatoid   tw2 

      Arthritis; Scleroderma;                                                                     

- PSHx:                                                                                           

11:48 hysterectomy;                                                                           tw2 

                                                                                                  

- Immunization history:: Client reports receiving the 2nd dose of the Covid vaccine,              

  Flu vaccine is up to date.                                                                      

- Social history:: Smoking status: Patient denies any tobacco usage or history of.                

                                                                                                  

                                                                                                  

Screenin:46 Abuse screen: Denies threats or abuse. Nutritional screening: No deficits noted.        oh  

      Tuberculosis screening: No symptoms or risk factors identified. Fall Risk Gait-.            

                                                                                                  

Assessment:                                                                                       

12:46 GI: Bowel sounds present X 4 quads. Abdomen is tender to palpation.                     oh  

12:47 General: Appears distressed, Behavior is agitated, Reports abdominal pain, nausea and   oh  

      right shoulder pain. Pain: Complains of pain in abdomen. Neuro: No deficits noted.          

      Cardiovascular: Rhythm is sinus bradycardia. Respiratory: No deficits noted. : No         

      deficits noted. Musculoskeletal: Reports pain in right shoulder.                            

13:32 Reassessment: fentanyl 25 mcg given will assess upon return. pt off the floor to CT.    oh  

18:35 Reassessment: attempt to call report, per [facility] room is not clean, nurse not ready   oh  

      to get report.                                                                              

                                                                                                  

Vital Signs:                                                                                      

11:43  / 70; Pulse 73; Resp 17; Temp 97.7(TE); Pulse Ox 97% on R/A; Weight 62.6 kg (R); tw2 

      Height 5 ft. 4 in. (162.56 cm); Pain 10/10;                                                 

13:19  / 92; Pulse 81; Resp 19; Pulse Ox 98% ;                                          oh  

17:33  / 96; Pulse 90; Resp 20; Temp 98.7; Pulse Ox 96% on R/A;                         oh  

11:43 Body Mass Index 23.69 (62.60 kg, 162.56 cm)                                             tw2 

                                                                                                  

ED Course:                                                                                        

11:39 Patient arrived in ED.                                                                  [id number] 

11:47 Triage completed.                                                                       tw2 

11:47 Arm band placed on.                                                                     tw2 

11:52 Jordan Mcbride, DEMETRIA is Primary Nurse.                                                   oh  

11:52 Jose Juan Enamorado PA is PHCP.                                                              m 

11:52 Mike May MD is Attending Physician.                                              jmm 

12:40 Inserted saline lock: 22 gauge in left antecubital area, using aseptic technique. Blood ss  

      collected. Insertoin VIA ultrasound guided.                                                 

12:46 Bed in low position. Call light in reach. Side rails up X2.                             oh  

13:47 CT Abd/Pelvis - Without Contrast In Process Unspecified.                                EDMS

16:14 Phillips cath inserted, using sterile technique, 16 Fr., by me, balloon inflated, to       vg1 

      gravity drainage, urine specimen collected. returned cloudy urine. Patient tolerated        

      well. 300 mL output.                                                                        

16:19 Urine Culture Sent.                                                                     oh  

16:19 Urine Culture Sent.                                                                     oh  

16:34 initiated transfer to [facility].                                              bd  

16:37 COVID-19 : Document "Date of Symptom Onset" if Symptomatic. Sent.                       kh1 

16:52 Abdomen 1 View (KUB) XRAY In Process Unspecified.                                       EDMS

17:25 initiated transfer to [facility].                                                   bd  

18:25 transfer to [facility] cancelled.                                                             bd  

18:25 pt accepted in transfer to [facility] by Dr Aguillon, admin approval given by MAR Franco.                                                                                      

19:10 Basic Metabolic Panel Sent.                                                             cw2 

                                                                                                  

Administered Medications:                                                                         

12:28 Drug: morphine 4 mg Route: IM; Site: left deltoid;                                      oh  

17:38 Follow up: Response: No adverse reaction                                                oh  

12:28 Drug: Zofran (Ondansetron) 4 mg Route: PO;                                              oh  

12:59 Drug: diphenhydrAMINE 12.5 mg Route: IVP; Site: left antecubital;                       oh  

17:38 Follow up: Response: No adverse reaction                                                oh  

13:31 Drug: fentaNYL (PF) 50 mcg Route: IVP; Site: left antecubital;                          oh  

17:38 Follow up: Response: No adverse reaction                                                oh  

15:20 Drug: Flomax (tamsulosin) 0.4 mg Route: PO;                                             oh  

17:38 Follow up: Response: No adverse reaction                                                oh  

15:23 Drug: NS 0.9% 250 ml Route: IV; Rate: bolus; Site: left antecubital;                    oh  

15:24 Drug: Zofran (Ondansetron) 4 mg Route: IVP; Site: left antecubital;                     oh  

17:38 Follow up: Response: No adverse reaction                                                oh  

16:18 Drug: Rocephin (cefTRIAXone) 1 grams Route: IV; Rate: calculated rate; Site: left       oh  

      antecubital;                                                                                

17:49 Drug: Rocephin (cefTRIAXone) 1 grams Route: IV; Rate: calculated rate; Site: left       oh  

      antecubital;                                                                                

                                                                                                  

                                                                                                  

Outcome:                                                                                          

16:53 ER care complete, transfer ordered by MD.                                               annemarie 

20:27 Patient left the ED.                                                                    cw2 

                                                                                                  

Addendum:                                                                                         

10/03/2021                                                                                        

     07:59 Addendum: Culture Results: Positive urine culture. called [facility] 21st floor and e
isamar

           spoke with Felicia Nunez/ faxed positive culture to [phone].                           

                                                                                                  

Signatures:                                                                                       

Dispatcher [employer]                           EDMS                                                 

Cece Banerjee Joel, PA PA jmm Smirch, Shelby, RN                      RN   ss                                                   

Pippa Guerra RN                          RN   tw2                                                  

Arlen Watson Victoria, RN                    RN   [email or web address]                                                  

Mirta Cuba                                [facility]                                                  

Chris York RN               RN   cw2                                                  

Jordan Mcbride RN                     Tamera Quintana9                                                  

                                                                                                  

Corrections: (The following items were deleted from the chart)                                    

                                                                                             

11:50 11:48 Allergies: Sulfa; tw2                                                             tw2 

12:41 12:40 Inserted saline lock: 22 gauge in left antecubital area, using aseptic technique.   

      Blood collected.                                                                          

16:17 16:14 Phillips cath inserted, using sterile technique, 16 Fr., by me, balloon inflated, to vg1 

      gravity drainage, vg1                                                                       

16:22 16:14 Phillips cath inserted, using sterile technique, 16 Fr., by me, balloon inflated, to vg1 

      gravity drainage, returned 300 ml output. Patient tolerated well. vg1                       

17:40 17:33  / 96; Pulse 90bpm; Resp 20bpm; Pulse Ox 96% RA; oh                         oh  

18:16 16:37 CORONAVIRUS drawn and sent. 1                                                   EDMS

                                                                                                  

**************************************************************************************************

## 2021-09-29 NOTE — RAD REPORT
EXAM DESCRIPTION:  RAD - Abdomen 1 View (KUB) - 9/29/2021 4:51 pm

 

CLINICAL HISTORY:  evaluate for kidney stone

Pain

 

COMPARISON:  Abdomen   Pelvis Wo Contrast dated 9/29/2021

 

FINDINGS:  The bowel gas pattern is non-obstructive. No evidence of free air or pneumatosis. [place] c
alculus in the right inferior pelvis likely represents distal right ureter stone.

## 2021-09-29 NOTE — RAD REPORT
EXAM DESCRIPTION:  CT - Abdomen   Pelvis Wo Contrast - 9/29/2021 1:47 pm

 

CLINICAL HISTORY:  Abdominal pain.

abdominal pain

 

COMPARISON:  No comparisons

 

TECHNIQUE:  CT imaging of the abdomen and pelvis was performed without contrast. Solid organ, bowel a
nd vascular assessment is limited due to lack of IV and oral contrast.

 

All CT scans are performed using dose optimization technique as appropriate and may include automated
 exposure control or mA/KV adjustment according to patient size.

 

FINDINGS:  Prominent emphysematous changes present in both lower lobes.Small to moderate hiatal herni
a.

 

Noncontrast assessment of the liver demonstrates no focal mass or biliary dilatation. The spleen, pan
creas, adrenal glands are normal.Atrophy left kidney is present.

 

Moderate right kidney hydronephrosis is present caused by 10 mm calculus at the right UVJ level.

 

No bowel obstruction, free air, free fluid or abscess.  The appendix is normal.

 

The osseous structures are within normal limits.

 

IMPRESSION:  Moderate hydronephrosis and hydroureter on the right is seen caused by oblong 8 mm stone
 at the right UVJ.

 

A limited non-contrast examination was performed as detailed.

## 2021-09-30 NOTE — EKG
Test Date:    2021-09-29               Test Time:    12:02:38

Technician:   OH                                     

                                                     

MEASUREMENT RESULTS:                                       

Intervals:                                           

Rate:         56                                     

MD:           250                                    

QRSD:         90                                     

QT:           488                                    

QTc:          470                                    

Axis:                                                

P:            49                                     

MD:           250                                    

QRS:          -28                                    

T:            62                                     

                                                     

INTERPRETIVE STATEMENTS:                                       

                                                     

Sinus bradycardia with 1st degree AV block

Voltage criteria for left ventricular hypertrophy

Abnormal ECG

Compared to ECG 07/12/2021 17:16:33

Sinus rhythm no longer present

Left-axis deviation no longer present



Electronically Signed On 09-30-21 15:56:32 CDT by Wicho Arora

## 2021-11-19 ENCOUNTER — HOSPITAL ENCOUNTER (EMERGENCY)
Dept: HOSPITAL 97 - ER | Age: 71
Discharge: HOME | End: 2021-11-19
Payer: COMMERCIAL

## 2021-11-19 VITALS — OXYGEN SATURATION: 94 % | DIASTOLIC BLOOD PRESSURE: 67 MMHG | SYSTOLIC BLOOD PRESSURE: 134 MMHG

## 2021-11-19 VITALS — TEMPERATURE: 98.2 F

## 2021-11-19 DIAGNOSIS — M25.562: Primary | ICD-10-CM

## 2021-11-19 PROCEDURE — 73562 X-RAY EXAM OF KNEE 3: CPT

## 2021-11-19 PROCEDURE — 96372 THER/PROPH/DIAG INJ SC/IM: CPT

## 2021-11-19 PROCEDURE — 96374 THER/PROPH/DIAG INJ IV PUSH: CPT

## 2021-11-19 PROCEDURE — 93971 EXTREMITY STUDY: CPT

## 2021-11-19 PROCEDURE — 99284 EMERGENCY DEPT VISIT MOD MDM: CPT

## 2021-11-19 PROCEDURE — 96375 TX/PRO/DX INJ NEW DRUG ADDON: CPT

## 2021-11-19 NOTE — RAD REPORT
EXAM DESCRIPTION:  USExtremity Venous Uni Ltd11/19/2021 7:41 pm

 

CLINICAL HISTORY:  left leg pain

 

COMPARISON:  None.

 

FINDINGS:  Left common femoral, superficial femoral, popliteal and  posterior tibial veins are compre
ssible and demonstrate augmentation. Doppler demonstrates good flow.

 

2.3 centimeter Baker's cyst

 

IMPRESSION:  No evidence of deep venous thrombosis involving the left lower extremity.

## 2021-11-19 NOTE — ER
Nurse's Notes                                                                                     

 [facility]                                                                 

Name: Yarelis Maurice                                                                                

Age: 71 yrs                                                                                       

Sex: Female                                                                                       

: 1950                                                                                   

MRN: [id number]                                                                                   

Arrival Date: 2021                                                                          

Time: 18:20                                                                                       

Account#: [id number]                                                                            

Bed 9                                                                                             

Private MD:                                                                                       

Diagnosis: Pain in left knee                                                                      

                                                                                                  

Presentation:                                                                                     

                                                                                             

18:42 Chief complaint: Patient states: has an old injury to left knee, pain has gotten worse  iw  

      today. Coronavirus screen: At this time, the client does not indicate any symptoms          

      associated with coronavirus-19. Ebola Screen: Patient negative for fever greater than       

      or equal to 101.5 degrees Fahrenheit, and additional compatible Ebola Virus Disease         

      symptoms Patient denies exposure to infectious person. Patient denies travel to an          

      Ebola-affected area in the 21 days before illness onset. No symptoms or risks               

      identified at this time. Initial Sepsis Screen: Does the patient meet any 2 criteria?       

      No. Patient's initial sepsis screen is negative. Does the patient have a suspected          

      source of infection? No. Patient's initial sepsis screen is negative. Risk Assessment:      

      Do you want to hurt yourself or someone else? Patient reports no desire to harm self or     

      others. Onset of symptoms was 2021.                                            

18:42 Method Of Arrival: Wheelchair                                                           iw  

18:42 Acuity: VALERIE 4                                                                           iw  

                                                                                                  

Historical:                                                                                       

- Allergies:                                                                                      

18:43 No Known Allergies;                                                                     iw  

- PMHx:                                                                                           

18:43 born with 1 kidney; COPD; Hypothyroidism; Osteoporosis; Raynaud's Disease; Rheumatoid   iw  

      Arthritis; Scleroderma;                                                                     

                                                                                                  

- Immunization history:: Client reports receiving the 2nd dose of the Covid vaccine.              

- Social history:: Smoking status: Patient/guardian denies using tobacco.                         

                                                                                                  

                                                                                                  

Screenin:46 Abuse screen: Denies threats or abuse. Denies injuries from another. Nutritional        aj1 

      screening: No deficits noted. Tuberculosis screening: No symptoms or risk factors           

      identified.                                                                                 

                                                                                                  

Assessment:                                                                                       

19:43 General: Appears in no apparent distress. uncomfortable, Behavior is calm, cooperative, aj1 

      appropriate for age. Pain: Complains of pain in left knee Pain does not radiate. Pain       

      currently is 10 out of 10 on a pain scale. Quality of pain is described as sharp.           

      Neuro: Level of Consciousness is awake, alert, obeys commands, Oriented to person,          

      place, time, situation. Cardiovascular: Patient's skin is warm and dry. Respiratory:        

      Airway is patent Respiratory effort is even, unlabored, Respiratory pattern is regular,     

      symmetrical. GI: No signs and/or symptoms were reported involving the gastrointestinal      

      system. : No signs and/or symptoms were reported regarding the genitourinary system.      

      EENT: No signs and/or symptoms were reported regarding the EENT system. Derm: No signs      

      and/or symptoms reported regarding the dermatologic system. Skin is pink, warm \T\ dry.     

      normal. Musculoskeletal: Range of motion: limited in left knee.                             

19:44 Reassessment: Patient medicated for pain per orders on MAR. Patient states "none of     aj1 

      those are very strong, but I guess together they might help. I usually get a shot of        

      something stronger for my pain." Patient was asked what pain medication typically works     

      for her. Patient states that she doesn't know the name of it. Patient states that she       

      is willing to try the pain medications that are currently ordered.                          

19:56 Reassessment: Patient states that she needs something different for pain. Instructed    aj1 

      patient that I would notify the PA, but the medications that had been administered had      

      not had enough time to start working yet. Patient states that it didn't matter because      

      those pain medications would not work for her.                                              

22:21 Reassessment: Patient is alert, oriented x 3, equal unlabored respirations, skin        bb  

      warm/dry/pink. pt verbalized understanding of and agrees to plan of care discharge          

      instructions given pt assisted to exit via wheelchair accompanied by ED tech and family.    

                                                                                                  

Vital Signs:                                                                                      

18:42  / 51; Pulse 98; Resp 16; Temp 98.2; Pulse Ox 90% on R/A; Weight 67.13 kg; Height iw  

      5 ft. 4 in. (162.56 cm); Pain 10/10;                                                        

22:10  / 67; Pulse 66; Resp 18; Pulse Ox 94% on R/A;                                    mw2 

18:42 Body Mass Index 25.40 (67.13 kg, 162.56 cm)                                             iw  

                                                                                                  

ED Course:                                                                                        

18:20 Patient arrived in ED.                                                                  ds1 

18:43 Triage completed.                                                                       iw  

18:44 Arm band placed on.                                                                     iw  

19:00 Robert Mack PA is PHCP.                                                                cp  

19:00 Neal Mejia MD is Attending Physician.                                             cp  

19:24 Jaleesa Bryant RN is Primary Nurse.                                                   aj1 

19:41 US Extremity Venous Unilateral Ltd In Process Unspecified.                              EDMS

19:46 Patient has correct armband on for positive identification. Bed in low position. Call   aj1 

      light in reach.                                                                             

19:46 No provider procedures requiring assistance completed.                                  aj1 

19:49 XRAY Knee LEFT 3 view In Process Unspecified.                                           EDMS

20:45 Inserted saline lock: 22 gauge in left forearm, using aseptic technique. Missed         ds4 

      attempt(s): 22 gauge in left forearm. Bleeding controlled, band aid applied, catheter       

      tip intact.                                                                                 

22:04 William Carter MD is Referral Physician.                                                cp  

22:09 Assisted with bedpan.                                                                   mw2 

22:22 IV discontinued, intact, bleeding controlled, No redness/swelling at site. Pressure     bb  

      dressing applied.                                                                           

                                                                                                  

Administered Medications:                                                                         

19:38 Drug: Ibuprofen 800 mg Route: PO;                                                       aj1 

19:38 Drug: Tylenol 650 mg Route: PO;                                                         aj1 

19:38 Drug: UltRAM (traMADol) 50 mg Route: PO;                                                aj1 

20:16 Drug: fentaNYL (PF) 25 mcg Route: IM; Site: left deltoid;                               aj1 

21:09 Drug: NS 0.9% 500 ml Route: IV; Rate: 500 ml/hr; Site: left antecubital;                aj1 

21:09 Drug: fentaNYL (PF) 25 mcg Route: IVP; Site: left antecubital;                          aj1 

22:03 Drug: Ketorolac 15 mg Route: IVP; Site: left antecubital;                               aj1 

22:04 Drug: morphine 2 mg Route: IVP; Site: left antecubital;                                 aj1 

                                                                                                  

                                                                                                  

Outcome:                                                                                          

22:05 Discharge ordered by MD.                                                                cp  

22:22 Discharged to home via wheelchair, with family.                                         bb  

22:22 Condition: stable                                                                           

22:22 Discharge instructions given to patient, family, Instructed on discharge instructions,      

      follow up and referral plans. medication usage, Demonstrated understanding of               

      instructions, follow-up care, medications, Prescriptions given X 2.                         

22:23 Patient left the ED.                                                                    bb  

                                                                                                  

Signatures:                                                                                       

Dispatcher [employer]                           EDMS                                                 

Jaleesa Bryant RN RN   aj1                                                  

Ne Sommer                                ds1                                                  

Yarelis Aquino RN RN bb Williams, Irene, RN RN iw Swanson, Donovan                             ds4                                                  

Robert Mack PA PA cp [place], MyKena                            mw2                                                  

                                                                                                  

Corrections: (The following items were deleted from the chart)                                    

18:44 18:42  / 51; Resp 16bpm; 67.13 kg; Height 5 ft. 4 in.; BMI: 25.4; Pain 10/10; iw  iw  

18 18:43 Allergies: Codeine; nausea; iw                                                    iw  

18 18:43 Allergies: Sulfa (Sulfonamide Antibiotics); iw                                    iw  

19:58 19:56 Reassessment: Patient states that she needs something different for pain.         aj1 

      Instructed patient that I would notify the PA, but the medications that had been            

      administered had not had enough time to start working yet. Patient states that it           

      didn't matter because those pain medications would not work for her. Notified MITCHELL Baltazar. No orders received at this time aj1                                                     

21:20 21:19 fentaNYL (PF) 25 mcg IVP in left antecubital aj1                                  aj1 

                                                                                                  

**************************************************************************************************

## 2021-11-19 NOTE — RAD REPORT
EXAM DESCRIPTION:  RAD - Knee Left 3 View - 11/19/2021 7:49 pm

 

CLINICAL HISTORY:   Left knee pain

 

FINDINGS:  No fracture or dislocation is seen.

 

The bones appear osteoporotic. Small joint effusion.

## 2021-11-19 NOTE — XMS REPORT
Continuity of Care Document

                          Created on:2021



Patient:MARKIE CORTES

Sex:Female

:1950

External Reference #:[id number]





Demographics







                          Address                   [address]



                                                    [place] [address]

 

                          Home Phone                [phone]

 

                          Work Phone                [phone]

 

                          Mobile Phone              [phone]

 

                          Email Address             [email or web address]

 

                          Preferred Language        English

 

                          Marital Status            Unknown

 

                          Yazdanism Affiliation     Unknown

 

                          Race                      Unknown

 

                          Additional Race(s)        Unavailable



                                                    White

 

                          Ethnic Group              Unknown









Author







                          Organization              [facility]

t

 

                          Address                   [address]



                                                    [place] [address]

 

                          Phone                     [phone]









Support







                Name            Relationship    Address         Phone

 

                JOSAFAT JOSHI               Unavailable     Unavailable

 

                MEZULIC         D               Unavailable     [phone]

 

                Unavailable     E               Unavailable     Unavailable

 

                Josafat           Spouse          [address]   [phone]



                                                [place] [address] 









Care Team Providers







                    Name                Role                Phone

 

                    SIS ROUSSEAU Primary Care Physician Unavailable

 

                    Angela-Vangieo_A_AH    Attending Clinician Unavailable

 

                    MELANIE CARRERO           Attending Clinician Unavailable

 

                    JAQUELIN         Attending Clinician Unavailable

 

                    Doctor Unassigned,  Name Attending Clinician Unavailable

 

                    Angela-Mbayo_A_AH    Admitting Clinician Unavailable

 

                    SHON      Admitting Clinician Unavailable









Payers







           Payer Name Policy Type Policy Number Effective Date Expiration Date S

delilah

 

           [facility] [place] -            [id number]  2020            



           [employer]                        00:00:00              



           (MEDICARE                                              



           REPLACEMENT/ADVANT                                             



           AGE - HMO)                                             

 

           [employer] MED[employer] HMO            [id number] 2021            



           POS                              00:00:00              







Problems

This patient has no known problems.



Allergies, Adverse Reactions, Alerts







       Allergy Allergy Status Severity Reaction(s) Onset  Inactive Treating Comm

ents 

Source



       Name   Type                        Date   Date   Clinician        

 

       SULFA  Allergy Active        N\T\V                        [facility]



       (SULFONA                             9-30                        [facility]



       [facility]                               [facility]                      [facility]



       [facility]                                                       [facility]



       [facility])                                                           

 

       NO KNOWN Allergy Active                                           [facility]



       ALLERGIE                                                         



       S                                                              







Medications

This patient has no known medications.



Vital Signs







             Vital Name   Observation Time Observation Value Comments     Source

 

             WEIGHT       2021-10-04 12:00:00 59.149 kg                 

 

             HEIGHT       2021-10-04 12:00:00 162.6 cm                  

 

             WEIGHT       2021-10-04 12:00:00 59.149 kg                 

 

             HEIGHT       2021-10-04 12:00:00 162.6 cm                  







Procedures

This patient has no known procedures.



Encounters







        Start   End     Encounter Admission Attending Care    Care    Encounter 

Source



        Date/Time Date/Time Type    Type    Clinicians Facility Department ID   

   

 

        2021         [facility]     [id number]

[facility] [facility]



        21:37:11                         _A_AH                   [id number]   Family



                                                                        Practic



                                                                        e

 

        2021 2021-10-05 [facility]    [facility] 

[id number] [facility]



        21:06:00 18:37:00                 , GITA                         

 

        2020 Orders          Doctor  LUIS    [id number]

 



        00:00:00 00:00:00 Only            Unassigned, LC   [id number]       

  



                                        [facility] [id number]         



                                                        [id number]         



                                                        009             







Results







           Test Description Test Time  Test Comments Results    Result Comments 

Source









                    AFB CULTURE + SMEAR (NON-SPUTUM) 2021 12:08:57 









                      Test Item  Value      Reference Range Interpretation Comme

nts









             CULTURE (BEAKER) (test code = 1095) No acid-fast bacilli isolated i

n 42 days                           

 

             AFB SMEAR (BEAKER) (test code = 994) No acid fast bacilli seen     

                      



FUNGUS CULTURE +  SMEAR2021-10-28 01:15:05





             Test Item    Value        Reference Range Interpretation Comments

 

             CULTURE (BEAKER) (test No fungus isolated in                       

    



             code = 1095) 28 days                                

 

             FUNGUS SMEAR (BEAKER) No fungi seen                           



             (test code = 1406)                                        



BLOOD CULTURE2021-10-06 20:01:08





             Test Item    Value        Reference Range Interpretation Comments

 

             CULTURE (BEAKER) (test No growth in 5 days                         

  



             code = 1095)                                        



The specimen volume collected for this blood culture was below the optimum (10 
mL per bottle or 20 mL total).  Use of lower volumes may adversely affect 
recovery and/or detection times of some organisms.SURGICALLY OBTAINED CULTURE + 
GRAM STAIN2021-10-05 12:12:16





             Test Item    Value        Reference    Interpretation Comments



                                       Range                     

 

             CULTURE (BEAKER) (test ENTEROCOCCUS              A            3+ En

terococcus



             code = 1095) FAECALIS                               faecalis

 

             Ampicillin (test code              See_Comment  S             [Auto

mated



             = 26)                                               message] The



                                                                 system which



                                                                 generated this



                                                                 result



                                                                 transmitted



                                                                 reference range

:



                                                                 Susceptible 0-8

 ,



                                                                 Resistant <0 or



                                                                 >8 . The



                                                                 reference range



                                                                 was not used to



                                                                 interpret this



                                                                 result as



                                                                 normal/abnormal

.

 

             Linezolid (test code =              See_Comment  S             [Aut

omated



             40)                                                 message] The



                                                                 system which



                                                                 generated this



                                                                 result



                                                                 transmitted



                                                                 reference range

:



                                                                 Susceptible 0-2

 ,



                                                                 Resistant <0 or



                                                                 >2 . The



                                                                 reference range



                                                                 was not used to



                                                                 interpret this



                                                                 result as



                                                                 normal/abnormal

.

 

             Vancomycin (test code              See_Comment  S             [Auto

mated



             = 13)                                               message] The



                                                                 system which



                                                                 generated this



                                                                 result



                                                                 transmitted



                                                                 reference range

:



                                                                 Susceptible 0-4

 ,



                                                                 Resistant <0 or



                                                                 >4 . The



                                                                 reference range



                                                                 was not used to



                                                                 interpret this



                                                                 result as



                                                                 normal/abnormal

.

 

             CULTURE (BEAKER) (test PROTEUS MIRABILIS              A            

3+ Proteus



             code = 1095)                                        mirabilis

 

             Amikacin (test code =                           S            



             1)                                                  

 

             Ampicillin + Sulbactam                           S            



             (test code = 6)                                        

 

             Aztreonam (test code =                           S            



             32)                                                 

 

             Cefepime (test code =                           S            



             51)                                                 

 

             Cefoxitin (test code =                           S            



             68)                                                 

 

             Ceftazidime (test code                           S            



             = 27)                                               

 

             Ceftriaxone (test code                           S            



             = 52)                                               

 

             Ertapenem (test code =                           S            



             38)                                                 

 

             Gentamicin (test code                           S            



             = 18)                                               

 

             Levofloxacin (test                           S            



             code = 22)                                          

 

             Meropenem (test code =                           S            



             34)                                                 

 

             Nitrofurantoin (test                           R            



             code = 23)                                          

 

             Piperacillin +                           S            



             Tazobactam (test code                                        



             = 29)                                               

 

             Tetracycline (test                           R            



             code = 2)                                           

 

             Tobramycin (test code                           S            



             = 25)                                               

 

             Trimethoprim +                           S            



             Sulfamethoxazole (test                                        



             code = 47)                                          

 

             GRAM STAIN RESULT <1+ WBCs                               



             (BEAKER) (test code =                                        



             1123)                                               

 

             GRAM STAIN RESULT No organisms seen                           



             (BEAKER) (test code =                                        



             894354)                                             



BASIC METABOLIC PANEL2021-10-05 06:57:48





             Test Item    Value        Reference Range Interpretation Comments

 

             SODIUM (BEAKER) 137 meq/L    136-145                   



             (test code = 381)                                        

 

             POTASSIUM (BEAKER) 4.0 meq/L    3.5-5.1                   



             (test code = 379)                                        

 

             CHLORIDE (BEAKER) 109 meq/L           H            



             (test code = 382)                                        

 

             CO2 (BEAKER) (test 21 meq/L     22-29        L            



             code = 355)                                         

 

             BLOOD UREA NITROGEN 15 mg/dL     7-21                      



             (BEAKER) (test code                                        



             = 354)                                              

 

             CREATININE (BEAKER) 1.04 mg/dL   0.57-1.25                 



             (test code = 358)                                        

 

             GLUCOSE RANDOM 73 mg/dL                         



             (BEAKER) (test code                                        



             = 652)                                              

 

             CALCIUM (BEAKER) 9.4 mg/dL    8.4-10.2                  



             (test code = 697)                                        

 

             EGFR (BEAKER) (test 52 mL/min/1.73                           ESTIMA

BRAEDEN GFR IS



             code = 1092) sq m                                   NOT AS ACCURATE

 AS



                                                                 CREATININE



                                                                 CLEARANCE IN



                                                                 PREDICTING



                                                                 GLOMERULAR



                                                                 FILTRATION RATE

.



                                                                 ESTIMATED GFR I

S



                                                                 NOT APPLICABLE 

FOR



                                                                 DIALYSIS PATIEN

TS.



 ID - [place] [facility] W/PLT COUNT &amp; AUTO DIFFERENTIAL2021-10-05 
06:08:09





             Test Item    Value        Reference Range Interpretation Comments

 

             WHITE BLOOD CELL COUNT (BEAKER) 6.8 K/ L     3.5-10.5              

    



             (test code = 775)                                        

 

             RED BLOOD CELL COUNT (BEAKER) 2.95 M/ L    3.93-5.22    L          

  



             (test code = 761)                                        

 

             HEMOGLOBIN (BEAKER) (test code = 8.9 GM/DL    11.2-15.7    L       

     



             410)                                                

 

             HEMATOCRIT (BEAKER) (test code = 28.5 %       34.1-44.9    L       

     



             411)                                                

 

             MEAN CORPUSCULAR VOLUME (BEAKER) 96.6 fL      79.4-94.8    H       

     



             (test code = 753)                                        

 

             MEAN CORPUSCULAR HEMOGLOBIN 30.2 pg      25.6-32.2                 



             (BEAKER) (test code = 751)                                        

 

             MEAN CORPUSCULAR HEMOGLOBIN CONC 31.2 GM/DL   32.2-35.5    L       

     



             (BEAKER) (test code = 752)                                        

 

             RED CELL DISTRIBUTION WIDTH 18.2 %       11.7-14.4    H            



             (BEAKER) (test code = 412)                                        

 

             PLATELET COUNT (BEAKER) (test 133 K/CU MM  150-450      L          

  



             code = 756)                                         

 

             MEAN PLATELET VOLUME (BEAKER) 10.9 fL      9.4-12.3                

  



             (test code = 754)                                        

 

             NUCLEATED RED BLOOD CELLS 0 /100 WBC   0-0                       



             (BEAKER) (test code = 413)                                        

 

             NEUTROPHILS RELATIVE PERCENT 55 %                                  

 



             (BEAKER) (test code = 429)                                        

 

             LYMPHOCYTES RELATIVE PERCENT 25 %                                  

 



             (BEAKER) (test code = 430)                                        

 

             MONOCYTES RELATIVE PERCENT 11 %                                   



             (BEAKER) (test code = 431)                                        

 

             EOSINOPHILS RELATIVE PERCENT 3 %                                   

 



             (BEAKER) (test code = 432)                                        

 

             BASOPHILS RELATIVE PERCENT 0 %                                    



             (BEAKER) (test code = 437)                                        

 

             NEUTROPHILS ABSOLUTE COUNT 3.70 K/ L    1.56-6.13                 



             (BEAKER) (test code = 670)                                        

 

             LYMPHOCYTES ABSOLUTE COUNT 1.71 K/ L    1.18-3.74                 



             (BEAKER) (test code = 414)                                        

 

             MONOCYTES ABSOLUTE COUNT (BEAKER) 0.75 K/ L    0.24-0.36    H      

      



             (test code = 415)                                        

 

             EOSINOPHILS ABSOLUTE COUNT 0.21 K/ L    0.04-0.36                 



             (BEAKER) (test code = 416)                                        

 

             BASOPHILS ABSOLUTE COUNT (BEAKER) 0.02 K/ L    0.01-0.08           

      



             (test code = 417)                                        

 

             IMMATURE GRANULOCYTES-RELATIVE 5 %          0-1          H         

   



             PERCENT (BEAKER) (test code =                                      

  



             2801)                                               



BASIC METABOLIC PANEL2021-10-04 02:59:06





             Test Item    Value        Reference Range Interpretation Comments

 

             SODIUM (BEAKER) 136 meq/L    136-145                   



             (test code = 381)                                        

 

             POTASSIUM (BEAKER) 4.8 meq/L    3.5-5.1                   Specimen 

slightly



             (test code = 379)                                        hemolyzed

 

             CHLORIDE (BEAKER) 110 meq/L           H            



             (test code = 382)                                        

 

             CO2 (BEAKER) (test 18 meq/L     22-29        L            



             code = 355)                                         

 

             BLOOD UREA NITROGEN 15 mg/dL     7-21                      



             (BEAKER) (test code                                        



             = 354)                                              

 

             CREATININE (BEAKER) 0.90 mg/dL   0.57-1.25                 Specimen

 slightly



             (test code = 358)                                        hemolyzed

 

             GLUCOSE RANDOM 78 mg/dL                         



             (BEAKER) (test code                                        



             = 652)                                              

 

             CALCIUM (BEAKER) 9.4 mg/dL    8.4-10.2                  



             (test code = 697)                                        

 

             EGFR (BEAKER) (test 62 mL/min/1.73                           ESTIMA

BRAEDEN GFR IS



             code = 1092) sq m                                   NOT AS ACCURATE

 AS



                                                                 CREATININE



                                                                 CLEARANCE IN



                                                                 PREDICTING



                                                                 GLOMERULAR



                                                                 FILTRATION RATE

.



                                                                 ESTIMATED GFR I

S



                                                                 NOT APPLICABLE 

FOR



                                                                 DIALYSIS PATIEN

TS.



 ID - [facility] W/PLT COUNT &amp; AUTO DIFFERENTIAL2021-10-04 02:30:27





             Test Item    Value        Reference Range Interpretation Comments

 

             WHITE BLOOD CELL COUNT (BEAKER) 6.9 K/ L     3.5-10.5              

    



             (test code = 775)                                        

 

             RED BLOOD CELL COUNT (BEAKER) 2.63 M/ L    3.93-5.22    L          

  



             (test code = 761)                                        

 

             HEMOGLOBIN (BEAKER) (test code = 8.1 GM/DL    11.2-15.7    L       

     



             410)                                                

 

             HEMATOCRIT (BEAKER) (test code = 25.1 %       34.1-44.9    L       

     



             411)                                                

 

             MEAN CORPUSCULAR VOLUME (BEAKER) 95.4 fL      79.4-94.8    H       

     



             (test code = 753)                                        

 

             MEAN CORPUSCULAR HEMOGLOBIN 30.8 pg      25.6-32.2                 



             (BEAKER) (test code = 751)                                        

 

             MEAN CORPUSCULAR HEMOGLOBIN CONC 32.3 GM/DL   32.2-35.5            

     



             (BEAKER) (test code = 752)                                        

 

             RED CELL DISTRIBUTION WIDTH 18.0 %       11.7-14.4    H            



             (BEAKER) (test code = 412)                                        

 

             PLATELET COUNT (BEAKER) (test 131 K/CU MM  150-450      L          

  



             code = 756)                                         

 

             MEAN PLATELET VOLUME (BEAKER) 10.2 fL      9.4-12.3                

  



             (test code = 754)                                        

 

             NUCLEATED RED BLOOD CELLS 0 /100 WBC   0-0                       



             (BEAKER) (test code = 413)                                        

 

             NEUTROPHILS RELATIVE PERCENT 70 %                                  

 



             (BEAKER) (test code = 429)                                        

 

             LYMPHOCYTES RELATIVE PERCENT 21 %                                  

 



             (BEAKER) (test code = 430)                                        

 

             MONOCYTES RELATIVE PERCENT 7 %                                    



             (BEAKER) (test code = 431)                                        

 

             EOSINOPHILS RELATIVE PERCENT 2 %                                   

 



             (BEAKER) (test code = 432)                                        

 

             BASOPHILS RELATIVE PERCENT 0 %                                    



             (BEAKER) (test code = 437)                                        

 

             NEUTROPHILS ABSOLUTE COUNT 4.77 K/ L    1.56-6.13                 



             (BEAKER) (test code = 670)                                        

 

             LYMPHOCYTES ABSOLUTE COUNT 1.41 K/ L    1.18-3.74                 



             (BEAKER) (test code = 414)                                        

 

             MONOCYTES ABSOLUTE COUNT (BEAKER) 0.48 K/ L    0.24-0.36    H      

      



             (test code = 415)                                        

 

             EOSINOPHILS ABSOLUTE COUNT 0.12 K/ L    0.04-0.36                 



             (BEAKER) (test code = 416)                                        

 

             BASOPHILS ABSOLUTE COUNT (BEAKER) 0.01 K/ L    0.01-0.08           

      



             (test code = 417)                                        

 

             IMMATURE GRANULOCYTES-RELATIVE 1 %          0-1                    

   



             PERCENT (BEAKER) (test code =                                      

  



             2801)                                               



ANAEROBIC [id number] 11:36:21





             Test Item    Value        Reference Range Interpretation Comments

 

             CULTURE (BEAKER) (test No anaerobes isolated                       

    



             code = 1095)                                        



CBC W/PLT COUNT &amp; AUTO DIFFERENTIAL2021-10-03 07:01:08





             Test Item    Value        Reference Range Interpretation Comments

 

             WHITE BLOOD CELL COUNT 7.6 K/ L     3.5-10.5                  



             (BEAKER) (test code =                                        



             775)                                                

 

             RED BLOOD CELL COUNT 2.54 M/ L    3.93-5.22    L            



             (BEAKER) (test code =                                        



             761)                                                

 

             HEMOGLOBIN (BEAKER) 7.7 GM/DL    11.2-15.7    L            



             (test code = 410)                                        

 

             HEMATOCRIT (BEAKER) 24.5 %       34.1-44.9    L            



             (test code = 411)                                        

 

             MEAN CORPUSCULAR 96.5 fL      79.4-94.8    H            Discordant 

results



             VOLUME (BEAKER) (test                                        compar

ed to



             code = 753)                                         previous, clini

charmaine



                                                                 correlation



                                                                 required.

 

             MEAN CORPUSCULAR 30.3 pg      25.6-32.2                 



             HEMOGLOBIN (BEAKER)                                        



             (test code = 751)                                        

 

             MEAN CORPUSCULAR 31.4 GM/DL   32.2-35.5    L            



             HEMOGLOBIN CONC                                        



             (BEAKER) (test code =                                        



             752)                                                

 

             RED CELL DISTRIBUTION 18.5 %       11.7-14.4    H            



             WIDTH (BEAKER) (test                                        



             code = 412)                                         

 

             PLATELET COUNT 106 K/CU MM  150-450      L            



             (BEAKER) (test code =                                        



             756)                                                

 

             MEAN PLATELET VOLUME 10.8 fL      9.4-12.3                  



             (BEAKER) (test code =                                        



             754)                                                

 

             NUCLEATED RED BLOOD 0 /100 WBC   0-0                       



             CELLS (BEAKER) (test                                        



             code = 413)                                         

 

             NEUTROPHILS RELATIVE 82 %                                   



             PERCENT (BEAKER) (test                                        



             code = 429)                                         

 

             LYMPHOCYTES RELATIVE 13 %                                   



             PERCENT (BEAKER) (test                                        



             code = 430)                                         

 

             MONOCYTES RELATIVE 4 %                                    



             PERCENT (BEAKER) (test                                        



             code = 431)                                         

 

             EOSINOPHILS RELATIVE 1 %                                    



             PERCENT (BEAKER) (test                                        



             code = 432)                                         

 

             BASOPHILS RELATIVE 0 %                                    



             PERCENT (BEAKER) (test                                        



             code = 437)                                         

 

             NEUTROPHILS ABSOLUTE 6.22 K/ L    1.56-6.13    H            



             COUNT (BEAKER) (test                                        



             code = 670)                                         

 

             LYMPHOCYTES ABSOLUTE 0.97 K/ L    1.18-3.74    L            



             COUNT (BEAKER) (test                                        



             code = 414)                                         

 

             MONOCYTES ABSOLUTE 0.27 K/ L    0.24-0.36                 



             COUNT (BEAKER) (test                                        



             code = 415)                                         

 

             EOSINOPHILS ABSOLUTE 0.08 K/ L    0.04-0.36                 



             COUNT (BEAKER) (test                                        



             code = 416)                                         

 

             BASOPHILS ABSOLUTE 0.01 K/ L    0.01-0.08                 



             COUNT (BEAKER) (test                                        



             code = 417)                                         

 

             IMMATURE     1 %          0-1                       



             GRANULOCYTES-RELATIVE                                        



             PERCENT (BEAKER) (test                                        



             code = 2801)                                        



BASIC METABOLIC PANEL2021-10-03 06:54:01





             Test Item    Value        Reference Range Interpretation Comments

 

             SODIUM (BEAKER) 137 meq/L    136-145                   



             (test code = 381)                                        

 

             POTASSIUM (BEAKER) 4.6 meq/L    3.5-5.1                   



             (test code = 379)                                        

 

             CHLORIDE (BEAKER) 114 meq/L           H            



             (test code = 382)                                        

 

             CO2 (BEAKER) (test 18 meq/L     22-29        L            



             code = 355)                                         

 

             BLOOD UREA NITROGEN 19 mg/dL     7-21                      



             (BEAKER) (test code                                        



             = 354)                                              

 

             CREATININE (BEAKER) 1.15 mg/dL   0.57-1.25                 



             (test code = 358)                                        

 

             GLUCOSE RANDOM 72 mg/dL                         



             (BEAKER) (test code                                        



             = 652)                                              

 

             CALCIUM (BEAKER) 9.9 mg/dL    8.4-10.2                  



             (test code = 697)                                        

 

             EGFR (BEAKER) (test 47 mL/min/1.73                           ESTIMA

BRAEDEN GFR IS



             code = 1092) sq m                                   NOT AS ACCURATE

 AS



                                                                 CREATININE



                                                                 CLEARANCE IN



                                                                 PREDICTING



                                                                 GLOMERULAR



                                                                 FILTRATION RATE

.



                                                                 ESTIMATED GFR I

S



                                                                 NOT APPLICABLE 

FOR



                                                                 DIALYSIS PATIEN

TS.



 ID - DARRELL WVITAMIN B12 AND FOLATE2021-10-02 15:03:06





             Test Item    Value        Reference Range Interpretation Comments

 

             VITAMIN B12 (BEAKER) 347 pg/mL    213-816                   



             (test code = 774)                                        

 

             FOLATE (BEAKER) 1.70 ng/mL   See_Comment  L             [Automated 

message]



             (test code = 362)                                        The system

 which



                                                                 generated this 

result



                                                                 transmitted ref

erence



                                                                 range: >=7.00. 

The



                                                                 reference range

 was not



                                                                 used to interpr

et this



                                                                 result as



                                                                 normal/abnormal

.



 ID - CELIO GFERRITIN2021-10-02 15:03:05





             Test Item    Value        Reference Range Interpretation Comments

 

             FERRITIN (BEAKER) (test code = 351.19 ng/mL 5..00  H         

   



             361)                                                



 ID - CELIO RASCONON, TIBC, % SAT. (WITHOUT FERRITIN)2021-10-02 14:27:22





             Test Item    Value        Reference Range Interpretation Comments

 

             IRON (BEAKER) (test code = 547) 14.0 ug/dL   40.0-160.0   L        

    

 

             TOTAL IRON BINDING CAPACITY 149 ug/dL    250-450      L            



             (BEAKER) (test code = 769)                                        

 

             IRON % SATURATION (2) (BEAKER) 9 %          20-55        L         

   



             (test code = 2590)                                        



 ID - CELIO GCBC W/PLT COUNT &amp; AUTO DIFFERENTIAL2021-10-02 07:58:36





             Test Item    Value        Reference Range Interpretation Comments

 

             WHITE BLOOD CELL COUNT (BEAKER) 10.1 K/ L    3.5-10.5              

    



             (test code = 775)                                        

 

             RED BLOOD CELL COUNT (BEAKER) 2.83 M/ L    3.93-5.22    L          

  



             (test code = 761)                                        

 

             HEMOGLOBIN (BEAKER) (test code = 8.7 GM/DL    11.2-15.7    L       

     



             410)                                                

 

             HEMATOCRIT (BEAKER) (test code = 29.8 %       34.1-44.9    L       

     



             411)                                                

 

             MEAN CORPUSCULAR VOLUME (BEAKER) 105.3 fL     79.4-94.8    H       

     



             (test code = 753)                                        

 

             MEAN CORPUSCULAR HEMOGLOBIN 30.7 pg      25.6-32.2                 



             (BEAKER) (test code = 751)                                        

 

             MEAN CORPUSCULAR HEMOGLOBIN CONC 29.2 GM/DL   32.2-35.5    L       

     



             (BEAKER) (test code = 752)                                        

 

             RED CELL DISTRIBUTION WIDTH 19.0 %       11.7-14.4    H            



             (BEAKER) (test code = 412)                                        

 

             PLATELET COUNT (BEAKER) (test 102 K/CU MM  150-450      L          

  



             code = 756)                                         

 

             MEAN PLATELET VOLUME (BEAKER) 11.8 fL      9.4-12.3                

  



             (test code = 754)                                        

 

             NUCLEATED RED BLOOD CELLS 0 /100 WBC   0-0                       



             (BEAKER) (test code = 413)                                        

 

             NEUTROPHILS RELATIVE PERCENT 90 %                                  

 



             (BEAKER) (test code = 429)                                        

 

             LYMPHOCYTES RELATIVE PERCENT 7 %                                   

 



             (BEAKER) (test code = 430)                                        

 

             MONOCYTES RELATIVE PERCENT 3 %                                    



             (BEAKER) (test code = 431)                                        

 

             EOSINOPHILS RELATIVE PERCENT 1 %                                   

 



             (BEAKER) (test code = 432)                                        

 

             BASOPHILS RELATIVE PERCENT 0 %                                    



             (BEAKER) (test code = 437)                                        

 

             NEUTROPHILS ABSOLUTE COUNT 9.00 K/ L    1.56-6.13    H            



             (BEAKER) (test code = 670)                                        

 

             LYMPHOCYTES ABSOLUTE COUNT 0.70 K/ L    1.18-3.74    L            



             (BEAKER) (test code = 414)                                        

 

             MONOCYTES ABSOLUTE COUNT (BEAKER) 0.26 K/ L    0.24-0.36           

      



             (test code = 415)                                        

 

             EOSINOPHILS ABSOLUTE COUNT 0.05 K/ L    0.04-0.36                 



             (BEAKER) (test code = 416)                                        

 

             BASOPHILS ABSOLUTE COUNT (BEAKER) 0.00 K/ L    0.01-0.08    L      

      



             (test code = 417)                                        

 

             IMMATURE GRANULOCYTES-RELATIVE 0 %          0-1                    

   



             PERCENT (BEAKER) (test code =                                      

  



             2801)                                               



BASIC METABOLIC PANEL202-10-02 07:02:31





             Test Item    Value        Reference Range Interpretation Comments

 

             SODIUM (BEAKER) 132 meq/L    136-145      L            



             (test code = 381)                                        

 

             POTASSIUM (BEAKER) 5.0 meq/L    3.5-5.1                   



             (test code = 379)                                        

 

             CHLORIDE (BEAKER) 111 meq/L           H            



             (test code = 382)                                        

 

             CO2 (BEAKER) (test 14 meq/L     22-29        L            



             code = 355)                                         

 

             BLOOD UREA NITROGEN 26 mg/dL     7-21         H            



             (BEAKER) (test code                                        



             = 354)                                              

 

             CREATININE (BEAKER) 1.65 mg/dL   0.57-1.25    H            



             (test code = 358)                                        

 

             GLUCOSE RANDOM 71 mg/dL                         



             (BEAKER) (test code                                        



             = 652)                                              

 

             CALCIUM (BEAKER) 9.4 mg/dL    8.4-10.2                  



             (test code = 697)                                        

 

             EGFR (BEAKER) (test 31 mL/min/1.73                           ESTIMA

BRAEDEN GFR IS



             code = 1092) sq m                                   NOT AS ACCURATE

 AS



                                                                 CREATININE



                                                                 CLEARANCE IN



                                                                 PREDICTING



                                                                 GLOMERULAR



                                                                 FILTRATION RATE

.



                                                                 ESTIMATED GFR I

S



                                                                 NOT APPLICABLE 

FOR



                                                                 DIALYSIS PATIEN

TS.



 ID - DBCT, ABDOMEN2021-10-01 19:04:00s/p right ureteral stent placement
Unlisted Reason for Exam - Click Yes and Enter Reason Below-&gt;YesUnlisted 
Reason for Exam-&gt;intra abdominal bleedingWill this procedure require oral 
contrast?-&gt;No************************************************************KORTNEY 
[facility]        : 1950     
  Sex: F************************************************************FINAL REPORT
PATIENT ID:   [id number]  ABDOMINAL AND PELVIS CT DATED 10/1/2021 CLINICAL INFO
RMATION:  Hypotensionintra abdominal bleeding TECHNIQUE:  Axial images of the 
abdomen and pelvis were obtained from diaphragm to the pubic symphysis without 
GI or intravenous contrast.  This exam was performed according to our 
departmental dose-optimization program, which includes automated exposure co
ntrol, adjustment of the mA and/or kV according to patient size and/or use of 
interactive reconstruction technique. COMMENT: There is trace bilateral pleural 
effusion. Interstitial pulmonary disease isseen in both lung bases with 
honeycombing formation suggestive of fibrotic changes. Liver and spleenare 
normal in size without focal abnormality.  Gallbladder is distended. No 
gallstone or biliary dilatation is noted.  Pancreas and adrenals are 
unremarkable.   Left kidney is atrophic. There is compensatory enlargement of 
the right kidney. A double-J ureter stent is seen on the right. No hydronephrosi
s or hydroureter is seen. Several cysts are seen in the right kidney with 
largest measuring 1.9 cm. Diverticular disease is seen in the large bowel 
without diverticulitis. The small bowel and appendix are normal in caliber. 
Vascular calcification is seen in the abdominal aorta and bilateral iliac arter
ies. Uterus is atrophic. The urinary bladder is minimally distended. Normal in 
caliber. No mass, adenopathy or ascites is present. IMPRESSION:  1. Trace 
bilateral pleural effusion.2. Findings suggestive of pulmonary fibrosis.3. 
Atrophic left kidney with compensatory enlargement of the right kidney.3. D
iverticulosis without diverticulitis.4. No hematoma or hemorrhage in the abdomen
or pelvis. Signed: Catherine Mars MDReport Verified Date/Time:  10/01/2021 19:04:40
Reading Location: [address] Body Reading Room      Electronically signed 
by: CATHERINE MARS M.D. on 10/01/2021 07:04 [id number] 16:57:43





             Test Item    Value        Reference Range Interpretation Comments

 

             CORTISOL, TOTAL (BEAKER) (test code 6.2 ug/dL    3.7-19.4          

        



             = 2755)                                             



 ID - DBCOMPREHENSIVE METABOLIC PANEL2021-10-01 16:55:00





             Test Item    Value        Reference Range Interpretation Comments

 

             TOTAL PROTEIN 4.6 gm/dL    6.0-8.3      L            



             (BEAKER) (test code =                                        



             770)                                                

 

             ALBUMIN (BEAKER) 2.5 g/dL     3.5-5.0      L            



             (test code = 1145)                                        

 

             ALKALINE PHOSPHATASE 60 U/L                           



             (BEAKER) (test code =                                        



             346)                                                

 

             BILIRUBIN TOTAL 0.3 mg/dL    0.2-1.2                   



             (BEAKER) (test code =                                        



             377)                                                

 

             SODIUM (BEAKER) (test 133 meq/L    136-145      L            



             code = 381)                                         

 

             POTASSIUM (BEAKER) 4.1 meq/L    3.5-5.1                   



             (test code = 379)                                        

 

             CHLORIDE (BEAKER) 110 meq/L           H            



             (test code = 382)                                        

 

             CO2 (BEAKER) (test 15 meq/L     22-29        L            



             code = 355)                                         

 

             BLOOD UREA NITROGEN 26 mg/dL     7-21         H            



             (BEAKER) (test code =                                        



             354)                                                

 

             CREATININE (BEAKER) 1.73 mg/dL   0.57-1.25    H            



             (test code = 358)                                        

 

             GLUCOSE RANDOM 64 mg/dL            L            



             (BEAKER) (test code =                                        



             652)                                                

 

             CALCIUM (BEAKER) 8.1 mg/dL    8.4-10.2     L            



             (test code = 697)                                        

 

             AST (SGOT) (BEAKER) 15 U/L       5-34                      



             (test code = 353)                                        

 

             ALT (SGPT) (BEAKER) 8 U/L        6-55                      



             (test code = 347)                                        

 

             EGFR (BEAKER) (test 29 mL/min/1.73                           ESTIMA

BRAEDEN GFR IS



             code = 1092) sq m                                   NOT AS ACCURATE

 AS



                                                                 CREATININE



                                                                 CLEARANCE IN



                                                                 PREDICTING



                                                                 GLOMERULAR



                                                                 FILTRATION RATE

.



                                                                 ESTIMATED GFR I

S



                                                                 NOT APPLICABLE 

FOR



                                                                 DIALYSIS PATIEN

TS.



 ID - DB(CELLAVISION MANUAL DIFF)2021-10-01 16:49:11





             Test Item    Value        Reference Range Interpretation Comments

 

             NEUTROPHILS - REL 98 %                                   



             (CELLAVISION)(BEAKER) (test code =                                 

       



             2816)                                               

 

             LYMPHOCYTES - REL 1 %                                    



             (CELLAVISION)(BEAKER) (test code =                                 

       



             2817)                                               

 

             BANDS - REL (CELLAVISION)(BEAKER) 1 %          0-10                

      



             (test code = 2826)                                        

 

             NEUTROPHILS - ABS 11.07 K/ul   1.56-6.13    H            



             (CELLAVISION)(BEAKER) (test code =                                 

       



             2830)                                               

 

             LYMPHOCYTES - ABS 0.11 K/ul    1.18-3.74    L            



             (CELLAVISION)(BEAKER) (test code =                                 

       



             2831)                                               

 

             BANDS - ABS (CELLAVISION)(BEAKER) 0.11 K/uL    0.00-0.80           

      



             (test code = 2840)                                        

 

             TOTAL COUNTED (BEAKER) (test code 100                              

      



             = 1351)                                             

 

             PLT MORPHOLOGY (BEAKER) (test code Normal                          

       



             = 486)                                              

 

             VACUOLATED NEUTROPHILS (BEAKER) Present                            

    



             (test code = 483)                                        

 

             ANISOCYTOSIS (BEAKER) (test code = 1+ few                          

       



             961)                                                

 

             MACROCYTES (BEAKER) (test code = 1+ few                            

     



             964)                                                

 

             POIKILOCYTES (BEAKER) (test code = 1+ few                          

       



             966)                                                

 

             BRIANNA CELLS (BEAKER) (test code = 1+ few                            

     



             474)                                                

 

             ARTIFACT (CELLAVISION)(BEAKER) Present                             

   



             (test code = 3432)                                        

 

             PLATELET CONCENTRATION Decreased                              



             (CELLAVISION)(BEAKER) (test code =                                 

       



             3438)                                               



 ID - Hodan Delarosa comments: Slide comments:C-REACTIVE PROTEIN
2021-10-01 16:37:03





             Test Item    Value        Reference Range Interpretation Comments

 

             C-REACTIVE PROTEIN (BEAKER) (test 16.36 mg/dL  0.00-0.50    H      

      



             code = 676)                                         



 ID - DBLACTIC ACID, VENOUS2021-10-01 16:32:57





             Test Item    Value        Reference Range Interpretation Comments

 

             LACTATE BLOOD VENOUS (2) (BEAKER) 1.25 mmol/L  0.50-2.20           

      



             (test code = 2872)                                        



 ID - DBCBC W/PLT COUNT &amp; AUTO DIFFERENTIAL2021-10-01 16:22:05





             Test Item    Value        Reference Range Interpretation Comments

 

             WHITE BLOOD CELL COUNT (BEAKER) 11.3 K/ L    3.5-10.5     H        

    



             (test code = 775)                                        

 

             RED BLOOD CELL COUNT (BEAKER) 2.66 M/ L    3.93-5.22    L          

  



             (test code = 761)                                        

 

             HEMOGLOBIN (BEAKER) (test code = 8.1 GM/DL    11.2-15.7    L       

     



             410)                                                

 

             HEMATOCRIT (BEAKER) (test code = 26.9 %       34.1-44.9    L       

     



             411)                                                

 

             MEAN CORPUSCULAR VOLUME (BEAKER) 101.1 fL     79.4-94.8    H       

     



             (test code = 753)                                        

 

             MEAN CORPUSCULAR HEMOGLOBIN 30.5 pg      25.6-32.2                 



             (BEAKER) (test code = 751)                                        

 

             MEAN CORPUSCULAR HEMOGLOBIN CONC 30.1 GM/DL   32.2-35.5    L       

     



             (BEAKER) (test code = 752)                                        

 

             RED CELL DISTRIBUTION WIDTH 18.6 %       11.7-14.4    H            



             (BEAKER) (test code = 412)                                        

 

             PLATELET COUNT (BEAKER) (test 100 K/CU MM  150-450      L          

  



             code = 756)                                         

 

             MEAN PLATELET VOLUME (BEAKER) 12.2 fL      9.4-12.3                

  



             (test code = 754)                                        

 

             NUCLEATED RED BLOOD CELLS 0 /100 WBC   0-0                       



             (BEAKER) (test code = 413)                                        



CBC W/PLT COUNT &amp; AUTO DIFFERENTIAL2021-10-01 12:28:55





             Test Item    Value        Reference Range Interpretation Comments

 

             WHITE BLOOD CELL COUNT (BEAKER) 10.8 K/ L    3.5-10.5     H        

    



             (test code = 775)                                        

 

             RED BLOOD CELL COUNT (BEAKER) 2.48 M/ L    3.93-5.22    L          

  



             (test code = 761)                                        

 

             HEMOGLOBIN (BEAKER) (test code = 7.7 GM/DL    11.2-15.7    L       

     



             410)                                                

 

             HEMATOCRIT (BEAKER) (test code = 24.9 %       34.1-44.9    L       

     



             411)                                                

 

             MEAN CORPUSCULAR VOLUME (BEAKER) 100.4 fL     79.4-94.8    H       

     



             (test code = 753)                                        

 

             MEAN CORPUSCULAR HEMOGLOBIN 31.0 pg      25.6-32.2                 



             (BEAKER) (test code = 751)                                        

 

             MEAN CORPUSCULAR HEMOGLOBIN CONC 30.9 GM/DL   32.2-35.5    L       

     



             (BEAKER) (test code = 752)                                        

 

             RED CELL DISTRIBUTION WIDTH 18.5 %       11.7-14.4    H            



             (BEAKER) (test code = 412)                                        

 

             PLATELET COUNT (BEAKER) (test code 83 K/CU MM   150-450      L     

       



             = 756)                                              

 

             MEAN PLATELET VOLUME (BEAKER) 10.9 fL      9.4-12.3                

  



             (test code = 754)                                        

 

             NUCLEATED RED BLOOD CELLS (BEAKER) 0 /100 WBC   0-0                

       



             (test code = 413)                                        

 

             NEUTROPHILS RELATIVE PERCENT 93 %                                  

 



             (BEAKER) (test code = 429)                                        

 

             LYMPHOCYTES RELATIVE PERCENT 3 %                                   

 



             (BEAKER) (test code = 430)                                        

 

             MONOCYTES RELATIVE PERCENT 2 %                                    



             (BEAKER) (test code = 431)                                        

 

             EOSINOPHILS RELATIVE PERCENT 1 %                                   

 



             (BEAKER) (test code = 432)                                        

 

             BASOPHILS RELATIVE PERCENT 0 %                                    



             (BEAKER) (test code = 437)                                        

 

             NEUTROPHILS ABSOLUTE COUNT 10.02 K/ L   1.56-6.13    H            



             (BEAKER) (test code = 670)                                        

 

             LYMPHOCYTES ABSOLUTE COUNT 0.30 K/ L    1.18-3.74    L            



             (BEAKER) (test code = 414)                                        

 

             MONOCYTES ABSOLUTE COUNT (BEAKER) 0.20 K/ L    0.24-0.36    L      

      



             (test code = 415)                                        

 

             EOSINOPHILS ABSOLUTE COUNT 0.09 K/ L    0.04-0.36                 



             (BEAKER) (test code = 416)                                        

 

             BASOPHILS ABSOLUTE COUNT (BEAKER) 0.02 K/ L    0.01-0.08           

      



             (test code = 417)                                        

 

             IMMATURE GRANULOCYTES-RELATIVE 2 %          0-1          H         

   



             PERCENT (BEAKER) (test code =                                      

  



             2801)                                               



BLOOD GAS, [id number] 11:55:59





             Test Item    Value        Reference Range Interpretation Comments

 

             PH ARTERIAL (BEAKER) (test code = 7.32         7.35-7.45    L      

      



             383)                                                

 

             PCO2 ARTERIAL (BEAKER) (test code 34 mm Hg     35-45        L      

      



             = 384)                                              

 

             PO2 ARTERIAL (BEAKER) (test code 102 mm Hg    80-90        H       

     



             = 385)                                              

 

             O2 SATURATION ARTERIAL (BEAKER) 97.3 %       96.0-97.0    H        

    



             (test code = 386)                                        

 

             HCO3 ARTERIAL (BEAKER) (test code 17 mmol/L    21-29        L      

      



             = 388)                                              

 

             BASE EXCESS ARTERIAL (BEAKER) -8.5 mmol/L  -2.0-3.0     L          

  



             (test code = 387)                                        

 

             PATIENT TEMPERATURE (BEAKER) 37.0                                  

 



             (test code = 1818)                                        

 

             FIO2 (BEAKER) (test code = 1819) 32.0                              

     



SPIN/CONCENTRATION [id number] 09:05:01





             Test Item    Value        Reference Range Interpretation Comments

 

             CONCENTRATION CHARGED (BEAKER) (test Done                          

         



             code = 2657)                                        



RAD, CHEST, 1 VIEW, NON DEPT2021-10-01 08:55:00Reason for exam:-&gt;low [id number]
this be performed at the bedside?-&gt;Yes
[facility]        : 1950        Sex: 
F************************************************************FINAL REPORT 
PATIENT ID:   [id number] INDICATION: low o2 COMPARISON: None TECHNIQUE: Single
frontal view of the chest. FINDINGS: Lungs and pleura: Bilateral airspace 
opacities concerning for multifocal pneumonia versus multifocal edema. No 
effusion.Heart and mediastinum: Normal heart size. Unremarkable mediastinal 
contours.Osseous structures: No acute abnormality.Other: None.  IMPRESSION: Daljit
ateral airspace opacities concerning for multifocal pneumonia versus multifocal 
edema.  Signed: Lorena Kaufmanepjanes Verified Date/Time:  10/01/2021 
08:55:25 Reading Location: [facility]iology Reading Room  Electronically 
signed by: LORENA KAUFMAN MD on 10/01/2021 08:55 AMBASIC METABOLIC 
PANEL2021-10-01 06:45:49





             Test Item    Value        Reference Range Interpretation Comments

 

             SODIUM (BEAKER) 134 meq/L    136-145      L            



             (test code = 381)                                        

 

             POTASSIUM (BEAKER) 4.8 meq/L    3.5-5.1                   



             (test code = 379)                                        

 

             CHLORIDE (BEAKER) 108 meq/L           H            



             (test code = 382)                                        

 

             CO2 (BEAKER) (test 16 meq/L     22-29        L            



             code = 355)                                         

 

             BLOOD UREA NITROGEN 29 mg/dL     7-21         H            



             (BEAKER) (test code                                        



             = 354)                                              

 

             CREATININE (BEAKER) 2.26 mg/dL   0.57-1.25    H            



             (test code = 358)                                        

 

             GLUCOSE RANDOM 63 mg/dL            L            



             (BEAKER) (test code                                        



             = 652)                                              

 

             CALCIUM (BEAKER) 8.4 mg/dL    8.4-10.2                  



             (test code = 697)                                        

 

             EGFR (BEAKER) (test 21 mL/min/1.73                           ESTIMA

BRAEDEN GFR IS



             code = 1092) sq m                                   NOT AS ACCURATE

 AS



                                                                 CREATININE



                                                                 CLEARANCE IN



                                                                 PREDICTING



                                                                 GLOMERULAR



                                                                 FILTRATION RATE

.



                                                                 ESTIMATED GFR I

S



                                                                 NOT APPLICABLE 

FOR



                                                                 DIALYSIS PATIEN

TS.



 ID - KIESHA LHEPATIC FUNCTION PANEL2021-10-01 06:34:17





             Test Item    Value        Reference Range Interpretation Comments

 

             TOTAL PROTEIN (BEAKER) (test code = 4.6 gm/dL    6.0-8.3      L    

        



             770)                                                

 

             ALBUMIN (BEAKER) (test code = 1145) 2.5 g/dL     3.5-5.0      L    

        

 

             BILIRUBIN TOTAL (BEAKER) (test code 0.3 mg/dL    0.2-1.2           

        



             = 377)                                              

 

             BILIRUBIN DIRECT (BEAKER) (test 0.2 mg/dL    0.1-0.5               

    



             code = 706)                                         

 

             ALKALINE PHOSPHATASE (BEAKER) (test 55 U/L                   

        



             code = 346)                                         

 

             AST (SGOT) (BEAKER) (test code = 20 U/L       5-34                 

     



             353)                                                

 

             ALT (SGPT) (BEAKER) (test code = 6 U/L        6-55                 

     



             347)                                                



 ID Jonel POP LMAGNESIUM2021-10-01 06:34:16





             Test Item    Value        Reference Range Interpretation Comments

 

             MAGNESIUM (BEAKER) (test code = 2.1 mg/dL    1.6-2.6               

    



             627)                                                



 ID Jonel POP LCBC W/PLT COUNT &amp; AUTO DIFFERENTIAL2021-10-01 05:22:25





             Test Item    Value        Reference Range Interpretation Comments

 

             WHITE BLOOD CELL COUNT 11.2 K/ L    3.5-10.5     H            



             (BEAKER) (test code =                                        



             775)                                                

 

             RED BLOOD CELL COUNT 2.88 M/ L    3.93-5.22    L            



             (BEAKER) (test code =                                        



             761)                                                

 

             HEMOGLOBIN (BEAKER) 8.8 GM/DL    11.2-15.7    L            Discorda

nt result



             (test code = 410)                                        compared t

o previous



                                                                 result. Clinica

l



                                                                 correlation req

uired

 

             HEMATOCRIT (BEAKER) 29.0 %       34.1-44.9    L            



             (test code = 411)                                        

 

             MEAN CORPUSCULAR 100.7 fL     79.4-94.8    H            



             VOLUME (BEAKER) (test                                        



             code = 753)                                         

 

             MEAN CORPUSCULAR 30.6 pg      25.6-32.2                 



             HEMOGLOBIN (BEAKER)                                        



             (test code = 751)                                        

 

             MEAN CORPUSCULAR 30.3 GM/DL   32.2-35.5    L            



             HEMOGLOBIN CONC                                        



             (BEAKER) (test code =                                        



             752)                                                

 

             RED CELL DISTRIBUTION 18.7 %       11.7-14.4    H            



             WIDTH (BEAKER) (test                                        



             code = 412)                                         

 

             PLATELET COUNT 94 K/CU MM   150-450      L            



             (BEAKER) (test code =                                        



             756)                                                

 

             MEAN PLATELET VOLUME 11.3 fL      9.4-12.3                  



             (BEAKER) (test code =                                        



             754)                                                

 

             NUCLEATED RED BLOOD 0 /100 WBC   0-0                       



             CELLS (BEAKER) (test                                        



             code = 413)                                         

 

             NEUTROPHILS RELATIVE 89 %                                   



             PERCENT (BEAKER) (test                                        



             code = 429)                                         

 

             LYMPHOCYTES RELATIVE 6 %                                    



             PERCENT (BEAKER) (test                                        



             code = 430)                                         

 

             MONOCYTES RELATIVE 3 %                                    



             PERCENT (BEAKER) (test                                        



             code = 431)                                         

 

             EOSINOPHILS RELATIVE 2 %                                    



             PERCENT (BEAKER) (test                                        



             code = 432)                                         

 

             BASOPHILS RELATIVE 0 %                                    



             PERCENT (BEAKER) (test                                        



             code = 437)                                         

 

             NEUTROPHILS ABSOLUTE 9.95 K/ L    1.56-6.13    H            



             COUNT (BEAKER) (test                                        



             code = 670)                                         

 

             LYMPHOCYTES ABSOLUTE 0.64 K/ L    1.18-3.74    L            



             COUNT (BEAKER) (test                                        



             code = 414)                                         

 

             MONOCYTES ABSOLUTE 0.35 K/ L    0.24-0.36                 



             COUNT (BEAKER) (test                                        



             code = 415)                                         

 

             EOSINOPHILS ABSOLUTE 0.17 K/ L    0.04-0.36                 



             COUNT (BEAKER) (test                                        



             code = 416)                                         

 

             BASOPHILS ABSOLUTE 0.02 K/ L    0.01-0.08                 



             COUNT (BEAKER) (test                                        



             code = 417)                                         

 

             IMMATURE     1 %          0-1                       



             GRANULOCYTES-RELATIVE                                        



             PERCENT (BEAKER) (test                                        



             code = 2801)                                        



FL, FLUORO, NON-SPECIFIC, UP TO 1 [id number] 09:48:00Reason for exam:-
&gt;CYSTOSCOPY LEFT URETERAL STENT PLACEMENT
********************************[facility]        : 1950        Sex: 
F************************************************************Fluoroscopic unit 
utilized for a procedure performed in the OR.  No interpretation was requested. 
Refer to the operative report for findings.  Refer to PACS for patient radiation
dose information.BASIC METABOLIC [id number] 01:44:56





             Test Item    Value        Reference Range Interpretation Comments

 

             SODIUM (BEAKER) 136 meq/L    136-145                   



             (test code = 381)                                        

 

             POTASSIUM (BEAKER) 4.9 meq/L    3.5-5.1                   Specimen 

slightly



             (test code = 379)                                        hemolyzed

 

             CHLORIDE (BEAKER) 109 meq/L           H            



             (test code = 382)                                        

 

             CO2 (BEAKER) (test 14 meq/L     22-29        L            



             code = 355)                                         

 

             BLOOD UREA NITROGEN 24 mg/dL     7-21         H            



             (BEAKER) (test code                                        



             = 354)                                              

 

             CREATININE (BEAKER) 2.45 mg/dL   0.57-1.25    H            Specimen

 slightly



             (test code = 358)                                        hemolyzed

 

             GLUCOSE RANDOM 93 mg/dL                         



             (BEAKER) (test code                                        



             = 652)                                              

 

             CALCIUM (BEAKER) 10.6 mg/dL   8.4-10.2     H            



             (test code = 697)                                        

 

             EGFR (BEAKER) (test 19 mL/min/1.73                           ESTIMA

BRAEDEN GFR IS



             code = 1092) sq m                                   NOT AS ACCURATE

 AS



                                                                 CREATININE



                                                                 CLEARANCE IN



                                                                 PREDICTING



                                                                 GLOMERULAR



                                                                 FILTRATION RATE

.



                                                                 ESTIMATED GFR I

S



                                                                 NOT APPLICABLE 

FOR



                                                                 DIALYSIS PATIEN

TS.



 ID - PIAYA [id number] 01:41:38





             Test Item    Value        Reference Range Interpretation Comments

 

             PHOSPHORUS (BEAKER) 2.9 mg/dL    2.3-4.7                   Specimen

 slightly



             (test code = 604)                                        hemolyzed



 ID - PIKEN [id number] 01:41:37





             Test Item    Value        Reference Range Interpretation Comments

 

             MAGNESIUM (BEAKER) 1.5 mg/dL    1.6-2.6      L            Specimen 

slightly



             (test code = 627)                                        hemolyzed



 ID - PIKEN [id number] 01:25:13





             Test Item    Value        Reference Range Interpretation Comments

 

             PARTIAL THROMBOPLASTIN TIME 34.8 seconds 22.5-36.0                 



             (BEAKER) (test code = 760)                                        



PROTHROMBIN TIME/[id number] 01:24:29





             Test Item    Value        Reference Range Interpretation Comments

 

             PROTIME (BEAKER) 14.5 seconds 11.9-14.2    H            



             (test code = 759)                                        

 

             INR (BEAKER) (test 1.15         See_Comment                [Automat

ed message]



             code = 370)                                         The system [facility]



                                                                 generated this 

result



                                                                 transmitted ref

erence



                                                                 range: <=5.90. 

The



                                                                 reference range

 was



                                                                 not used to int

erpret



                                                                 this result as



                                                                 normal/abnormal

.



RECOMMENDED COUMADIN/WARFARIN INR THERAPY RANGESSTANDARD DOSE: 2.0 - 3.0   
Includes: PROPHYLAXIS forvenous thrombosis, systemic embolization; TREATMENT for
venous thrombosis and/or pulmonary embolus.HIGH RISK: Target INR is 2.5-3.5 for 
patients with mechanical heart valves.CBC (HEMOGRAM ONLY)2021 01:09:46





             Test Item    Value        Reference Range Interpretation Comments

 

             WHITE BLOOD CELL COUNT (BEAKER) 26.2 K/ L    3.5-10.5     H        

    



             (test code = 775)                                        

 

             RED BLOOD CELL COUNT (BEAKER) 3.81 M/ L    3.93-5.22    L          

  



             (test code = 761)                                        

 

             HEMOGLOBIN (BEAKER) (test code = 11.8 GM/DL   11.2-15.7            

     



             410)                                                

 

             HEMATOCRIT (BEAKER) (test code = 37.7 %       34.1-44.9            

     



             411)                                                

 

             MEAN CORPUSCULAR VOLUME (BEAKER) 99.0 fL      79.4-94.8    H       

     



             (test code = 753)                                        

 

             MEAN CORPUSCULAR HEMOGLOBIN 31.0 pg      25.6-32.2                 



             (BEAKER) (test code = 751)                                        

 

             MEAN CORPUSCULAR HEMOGLOBIN CONC 31.3 GM/DL   32.2-35.5    L       

     



             (BEAKER) (test code = 752)                                        

 

             RED CELL DISTRIBUTION WIDTH 18.7 %       11.7-14.4    H            



             (BEAKER) (test code = 412)                                        

 

             PLATELET COUNT (BEAKER) (test 187 K/CU MM  150-450                 

  



             code = 756)                                         

 

             MEAN PLATELET VOLUME (BEAKER) 11.8 fL      9.4-12.3                

  



             (test code = 754)                                        

 

             NUCLEATED RED BLOOD CELLS 0 /100 WBC   0-0                       



             (BEAKER) (test code = 413)                                        



SARS-COV2/RT-PCR ([facility])2021 00:24:17





             Test Item    Value        Reference Range Interpretation Comments

 

             SARS-COV2/RT-PCR Negative     Negative                  The SARS-Co

V-2 target



             (test code =                                        nucleic acids a

re not



             [id number])                                            detected in thi

s specimen.



                                                                 Negative result

s do not



                                                                 preclude SARS-C

oV-2



                                                                 infection and s

hould not be



                                                                 used as the sol

e basis for



                                                                 patient managem

ent



                                                                 decisions. Nega

tive results



                                                                 must be combine

d with



                                                                 clinical observ

ations,



                                                                 patient history

, and



                                                                 epidemiological

 information.



                                                                 A false negativ

e result may



                                                                 occur if a spec

imen is



                                                                 improperly byron

ected,



                                                                 transported or 

handled.



                                                                 This SARS CoV-2

 test is a



                                                                 rapid, real-juaquin

e RT-PCR test



                                                                 intended for th

e qualitative



                                                                 detection of nu

cleic acid



                                                                 from SARS-CoV-2

 in a



                                                                 nasopharyngeal 

swab specimen



                                                                 collected from 

individuals



                                                                 suspected of CO

VID-19 by



                                                                 their healthcar

e provider.



This test has been authorized by FDA under an EUA for use by authorized 
laboratories.  This test is only authorized for the duration of the declaration 
that circumstances exist justifying the authorization of emergency use of in 
vitro diagnostic tests for detection and/or diagnosis of COVID-19 under Section 
564(b)(1) of the Federal Food, Drug and Cosmetic Act, 21 U.S.C.   360bbb-
3(b)(1), unless the authorization is terminated or revoked sooner.   Fact Sheet 
for Healthcare Providers: https://www.[employer]/Documents/Xpert%20Xpress%20SARS%20CoV-2/Fact%20Sheets/130-9922%20SARS-COV

-2%20HEALTHCARE%20PROVIDERS%20FACT%20SHEET.pdf Fact Sheet for Healthcare 
Patients: https://www.[employer]/Documents/Xpert
%20Xpress%20SARS%20CoV-2/Fact%20Sheets/3023801%58UYTC-TRB-6%20PATIENT%20FACT%20

SHEET.pdf

## 2022-04-08 ENCOUNTER — HOSPITAL ENCOUNTER (OUTPATIENT)
Dept: HOSPITAL 97 - DS | Age: 72
Discharge: HOME | End: 2022-04-08
Attending: INTERNAL MEDICINE
Payer: COMMERCIAL

## 2022-04-08 VITALS — TEMPERATURE: 97.2 F | DIASTOLIC BLOOD PRESSURE: 66 MMHG | OXYGEN SATURATION: 96 % | SYSTOLIC BLOOD PRESSURE: 148 MMHG

## 2022-04-08 VITALS — BODY MASS INDEX: 19 KG/M2

## 2022-04-08 DIAGNOSIS — E83.52: ICD-10-CM

## 2022-04-08 DIAGNOSIS — E21.1: Primary | ICD-10-CM

## 2022-04-08 LAB
ALBUMIN SERPL BCP-MCNC: 3.1 G/DL (ref 3.4–5)
ALP SERPL-CCNC: 57 U/L (ref 45–117)
ALT SERPL W P-5'-P-CCNC: 22 U/L (ref 12–78)
AST SERPL W P-5'-P-CCNC: 25 U/L (ref 15–37)
BUN BLD-MCNC: 11 MG/DL (ref 7–18)
GLUCOSE SERPLBLD-MCNC: 92 MG/DL (ref 74–106)
POTASSIUM SERPL-SCNC: 4 MMOL/L (ref 3.5–5.1)

## 2022-04-08 PROCEDURE — 36415 COLL VENOUS BLD VENIPUNCTURE: CPT

## 2022-04-08 PROCEDURE — 96365 THER/PROPH/DIAG IV INF INIT: CPT

## 2022-04-08 PROCEDURE — 96366 THER/PROPH/DIAG IV INF ADDON: CPT

## 2022-04-08 PROCEDURE — 80053 COMPREHEN METABOLIC PANEL: CPT

## 2023-01-10 ENCOUNTER — HOSPITAL ENCOUNTER (EMERGENCY)
Dept: HOSPITAL 97 - ER | Age: 73
Discharge: HOME | End: 2023-01-10
Payer: COMMERCIAL

## 2023-01-10 VITALS — TEMPERATURE: 98 F | DIASTOLIC BLOOD PRESSURE: 80 MMHG | OXYGEN SATURATION: 94 % | SYSTOLIC BLOOD PRESSURE: 128 MMHG

## 2023-01-10 DIAGNOSIS — I73.00: ICD-10-CM

## 2023-01-10 DIAGNOSIS — N18.6: ICD-10-CM

## 2023-01-10 DIAGNOSIS — Z51.5: ICD-10-CM

## 2023-01-10 DIAGNOSIS — M34.9: ICD-10-CM

## 2023-01-10 DIAGNOSIS — M79.10: Primary | ICD-10-CM

## 2023-01-10 LAB
HCT VFR BLD CALC: 38.4 % (ref 36–45)
LYMPHOCYTES # SPEC AUTO: 1.1 K/UL (ref 0.7–4.9)
MCV RBC: 87.2 FL (ref 80–100)
PMV BLD: 8.2 FL (ref 7.6–11.3)
RBC # BLD: 4.4 M/UL (ref 3.86–4.86)

## 2023-01-10 PROCEDURE — 81003 URINALYSIS AUTO W/O SCOPE: CPT

## 2023-01-10 PROCEDURE — 87086 URINE CULTURE/COLONY COUNT: CPT

## 2023-01-10 PROCEDURE — 99284 EMERGENCY DEPT VISIT MOD MDM: CPT

## 2023-01-10 PROCEDURE — 87088 URINE BACTERIA CULTURE: CPT

## 2023-01-10 PROCEDURE — 85025 COMPLETE CBC W/AUTO DIFF WBC: CPT

## 2023-01-10 PROCEDURE — 36415 COLL VENOUS BLD VENIPUNCTURE: CPT

## 2023-01-10 PROCEDURE — 81015 MICROSCOPIC EXAM OF URINE: CPT

## 2023-01-10 PROCEDURE — 71045 X-RAY EXAM CHEST 1 VIEW: CPT

## 2023-01-10 PROCEDURE — 93005 ELECTROCARDIOGRAM TRACING: CPT

## 2023-01-10 NOTE — EDPHYS
Physician Documentation                                                                           

 [facility]                                                                 

Name: Yarelis Maurice                                                                                

Age: 72 yrs                                                                                       

Sex: Female                                                                                       

: 1950                                                                                   

MRN: [id number]                                                                                   

Arrival Date: 01/10/2023                                                                          

Time: 11:10                                                                                       

Account#: [id number]                                                                            

Bed 8                                                                                             

Private MD:                                                                                       

ED Physician Milo Mckoy                                                                         

HPI:                                                                                              

01/10                                                                                             

13:14 This 72 yrs old Female presents to ER via EMS with complaints of Dizziness, Pain All    sp3 

      Over.                                                                                       

13:14 73-year-old female with history of scleroderma, Raynaud's disease, rheumatoid           sp3 

      arthritis, osteoporosis presents with her  by EMS for "pain all over". After         

      further discussion with patient and family, the main reason she was brought in was that     

      she is no longer wanting any care or does not want to take any of her medications. She      

      also does not want anybody bathing her or changing her soiled underclothing.  is     

      frustrated and wants to place her in nursing home but she refuses. Due to social            

      situation, he activated EMS who brought her here. There is no acute process to her pain     

      or any other symptoms that she is having. Review of systems is somewhat limited             

      secondary to patient answering yes to multiple things however she is alert and oriented     

      but does not give reasonable reasons for refusing her care. There is also an insurance      

      issue and her PCP got changed to Dr. Delvalle whom she has not seen.  is in the          

      process of trying to arrange an appointment but he states that she refuses to get into      

      the car. Get social work to come see patient in the ED as well..                            

                                                                                                  

Historical:                                                                                       

- Allergies:                                                                                      

11:13 No Known Allergies;                                                                     ll1 

- PMHx:                                                                                           

11:13 Scleroderma; Raynaud's Disease; Rheumatoid Arthritis; Osteoporosis; Hypothyroidism;     ll1 

      COPD; born with 1 kidney;                                                                   

11:15 Hypothyroidism; ESRD;                                                                   ll1 

- PSHx:                                                                                           

11:13 hysterectomy;                                                                           ll1 

                                                                                                  

- Immunization history:: Client reports receiving the 2nd dose of the Covid vaccine.              

- Social history:: Smoking status: Patient reports the use of cigarette tobacco                   

  products, denies chronic smoking, but will smoke occasionally.                                  

                                                                                                  

                                                                                                  

ROS:                                                                                              

13:18 Unable to obtain ROS due to patient being uncooperative.                                sp3 

                                                                                                  

Exam:                                                                                             

13:18 Constitutional:  This is a well developed, well nourished patient who is awake, alert,  sp3 

      and in no acute distress. Head/Face:  Normocephalic, atraumatic. Eyes:  Pupils equal        

      round and reactive to light, extra-ocular motions intact.  Lids and lashes normal.          

      Conjunctiva and sclera are non-icteric and not injected.  Cornea within normal limits.      

      Periorbital areas with no swelling, redness, or edema. ENT:  Nares patent. No nasal         

      discharge, no septal abnormalities noted.  External auditory canals are clear.              

      Oropharynx with no redness, swelling, or masses, exudates, or evidence of obstruction,      

      uvula midline.  Mucous membranes moist. Neck:  Trachea midline, no thyromegaly or           

      masses palpated, and no cervical lymphadenopathy.  Supple, full range of motion without     

      nuchal rigidity, or vertebral point tenderness.  No Meningismus. Chest/axilla:  Normal      

      chest wall appearance and motion.  Nontender with no deformity.  No lesions are             

      appreciated. Cardiovascular:  Regular rate and rhythm with a normal S1 and S2.  No          

      gallops, murmurs, or rubs.  Normal PMI, no JVD.  No pulse deficits. Respiratory:  Lungs     

      have equal breath sounds bilaterally, clear to auscultation and percussion.  No rales,      

      rhonchi or wheezes noted.  No increased work of breathing, no retractions or nasal          

      flaring. Neuro:  Awake and alert, GCS 15, oriented to person, place, time, and              

      situation.  Cranial nerves II-XII grossly intact.  Motor strength 5/5 in all                

      extremities.  Sensory grossly intact.  Cerebellar exam normal.  Normal gait.                

13:18 Skin: No significant rashes or bleeding seen.  Skin is tight in some areas over the         

      joints but patient is able to move all fingers.  There is a small ulcer not high and        

      grade is of a thumbnail on her lower posterior sacral area.  There are no signs of          

      infection.  .                                                                               

                                                                                                  

Vital Signs:                                                                                      

11:10  / 76; Pulse 87; Resp 17; Temp 97.7; Weight 45.36 kg; Height 5 ft. 4 in. (162.56  ll1 

      cm); Pain 10/10;                                                                            

14:07  / 80; Pulse 78; Resp 18; Temp 98.0; Pulse Ox 94% on R/A;                         ph  

11:10 Body Mass Index 17.16 (45.36 kg, 162.56 cm)                                             ll1 

                                                                                                  

MDM:                                                                                              

11:18 Patient medically screened.                                                             sp3 

13:20 Data reviewed: vital signs, nurses notes.                                               sp3 

13:20 ED course:  is at the bedside discussing options with the patient and      sp3 

      family to assist him further in their long-term needs. There is no acute emergency at       

      this time and patient will be safely discharged home. We will treat the bacteria found      

      on her urine analysis with outpatient medicine. Remainder of her laboratory values are      

      normal with troponin pending. EKG demonstrates no acute abnormality. Pending that final     

      data point, we will discharge patient safely home in the custody of her ..           

13:34 ED course: After  conversation, patient elects to be hospice care going  sp3 

      forward. She is refusing all treatment and food. Out of hospital DNR was executed. We       

      will discharge patient home at this time after another 2 mg of morphine IV with home        

      hospice coordinated..                                                                       

13:37 ED course: Is a recollect secondary to hemolysis and patient is declining. She also     sp3 

      refuses treatment for her UTI. Given that she is going home on hospice, we will honor       

      her wishes and discharge her..                                                              

                                                                                                  

01/10                                                                                             

11:19 Order name: CBC with Diff; Complete Time: 13:19                                         [facility] 

01/10                                                                                             

11:19 Order name: UA MICROSCOPIC; Complete Time: 13:19                                        [facility] 

01/10                                                                                             

11:19 Order name: XRAY Chest (1 view); Complete Time: 13:19                                   [facility] 

01/10                                                                                             

11:19 Order name: EKG; Complete Time: 11:19                                                   [facility] 

01/10                                                                                             

11:19 Order name: Cardiac monitoring; Complete Time: 12:57                                    [facility] 

01/10                                                                                             

12:24 Order name: Urine Dipstick-Ancillary; Complete Time: 13:19                              [facility]

01/10                                                                                             

12:49 Order name: Urine Culture                                                               [facility]

01/10                                                                                             

14:34 Order name: Social Service Consult                                                      [facility]

01/10                                                                                             

11:19 Order name: EKG - Nurse/Tech; Complete Time: 12:38                                      3 

01/10                                                                                             

11:19 Order name: IV Saline Lock; Complete Time: 11:37                                        3 

01/10                                                                                             

11:19 Order name: Labs collected and sent; Complete Time: 11:37                               3 

01/10                                                                                             

11:19 Order name: O2 Sat Monitoring; Complete Time: 11:37                                     3 

01/10                                                                                             

11:19 Order name: Urine Dipstick-Ancillary (obtain specimen); Complete Time: 12:16            [facility] 

01/10                                                                                             

11:56 Order name: Labs - recollect needed: recollect c7; Complete Time: 12:38                 bd  

01/10                                                                                             

13:31 Order name: Misc. Order:  consult; Complete Time: 13:51                  sp3 

                                                                                                  

Administered Medications:                                                                         

12:45 Drug: morphine 2 mg Route: IVP; Infused Over: 4 mins; Site: left forearm;               ph  

13:15 Follow up: Response: No adverse reaction; RASS: Alert and Calm (0)                      ph  

14:07 Drug: morphine 2 mg Route: IVP; Infused Over: 4 mins; Site: right forearm;              ph  

14:44 Follow up: Response: No adverse reaction; RASS: Alert and Calm (0)                      ph  

                                                                                                  

                                                                                                  

Disposition Summary:                                                                              

01/10/23 13:42                                                                                    

Discharge Ordered                                                                                 

      Location: Home                                                                          sp3 

      Condition: Stable                                                                       sp3 

      Diagnosis                                                                                   

        - Body pain, Hospice care                                                             sp3 

      Followup:                                                                               sp3 

        - With: Private Physician                                                                  

        - When: Upon discharge from the Emergency Department                                       

        - Reason: Continuance of care                                                              

      Discharge Instructions:                                                                     

        - Discharge Summary Sheet                                                             sp3 

        - Hospice                                                                             sp3 

      Forms:                                                                                      

        - Medication Reconciliation Form                                                      sp3 

        - Thank You Letter                                                                    sp3 

        - Antibiotic Education                                                                sp3 

        - Prescription Opioid Use                                                             sp3 

Signatures:                                                                                       

Dispatcher [employer]                           EDMS                                                 

Cece Banerjee Patricia, RN                      RN   Antionette Beck RN                       RN   ll1                                                  

Milo Mckoy MD MD   sp3                                                  

                                                                                                  

Corrections: (The following items were deleted from the chart)                                    

13:20 13:18 Skin: No significant rashes or bleeding seen. Skin is tight in some areas over    sp3 

      the joints but patient is able to move all fingers. There is a small ulcer not high and     

      grade is of a thumbnail on her lower posterior sacral area. There are no signs of           

      infection.  is at the bedside discussing options with the patient and          

      family to assist him further in their long-term needs. There is no acute emergency at       

      this time and patient will be safely discharged home.. sp3                                  

13:45 11:19 BASIC METABOLIC PANEL+C.LAB.BRZ ordered. EDMS                                     EDMS

13:45 11:19 HEPATIC FUNCTION+C.LAB.BRZ ordered. EDMS                                          EDMS

13:45 11:19 MAGNESIUM+C.LAB.BRZ ordered. EDMS                                                 EDMS

13:45 11:19 Troponin High Sensitivity+C.LAB.BRZ ordered. EDMS                                 EDMS

                                                                                                  

**************************************************************************************************

## 2023-01-10 NOTE — ER
Nurse's Notes                                                                                     

 [facility]                                                                 

Name: Yarelis Maurice                                                                                

Age: 72 yrs                                                                                       

Sex: Female                                                                                       

: 1950                                                                                   

MRN: [id number]                                                                                   

Arrival Date: 01/10/2023                                                                          

Time: 11:10                                                                                       

Account#: [id number]                                                                            

Bed 8                                                                                             

Private MD:                                                                                       

Diagnosis: Body pain, Hospice care                                                                

                                                                                                  

Presentation:                                                                                     

01/10                                                                                             

11:10 Chief complaint: EMS states:  reports to EMS: dizziness, pain all over, and      ll1 

      weakness that started today. Has a pressure ulcer to coccyx area, possibly infected.        

      Has old R arm FX from approx. 2 months ago. Blood sugar 118, 's systolic. No          

      fever. IV attempted L AC, no success. Coronavirus screen: Vaccine status: Patient           

      reports receiving the 2nd dose of the covid vaccine. Client denies travel out of the        

      [place] in the last 14 days. At this time, the client does not indicate any symptoms           

      associated with coronavirus-19. Ebola Screen: Patient denies travel to an                   

      Ebola-affected area in the 21 days before illness onset. Initial Sepsis Screen: Does        

      the patient meet any 2 criteria? No. Patient's initial sepsis screen is negative. Does      

      the patient have a suspected source of infection? Yes: Skin breakdown/wound. Risk           

      Assessment: Do you want to hurt yourself or someone else? Patient reports no desire to      

      harm self or others. Onset of symptoms was January 10, 2023.                                

11:10 Method Of Arrival: EMS                                                                  ll1 

11:10 Acuity: VALERIE 3                                                                           ll1 

                                                                                                  

Historical:                                                                                       

- Allergies:                                                                                      

11:13 No Known Allergies;                                                                     ll1 

- PMHx:                                                                                           

11:13 Scleroderma; Raynaud's Disease; Rheumatoid Arthritis; Osteoporosis; Hypothyroidism;     ll1 

      COPD; born with 1 kidney;                                                                   

11:15 Hypothyroidism; ESRD;                                                                   ll1 

- PSHx:                                                                                           

11:13 hysterectomy;                                                                           ll1 

                                                                                                  

- Immunization history:: Client reports receiving the 2nd dose of the Covid vaccine.              

- Social history:: Smoking status: Patient reports the use of cigarette tobacco                   

  products, denies chronic smoking, but will smoke occasionally.                                  

                                                                                                  

                                                                                                  

Screenin:19 Abuse screen: Denies threats or abuse. Denies injuries from another. Tuberculosis       ph  

      screening: No symptoms or risk factors identified.                                          

14:40 [facility] ED Fall Risk Assessment (Adult) History of falling in the last 3 months,       ph  

      including since admission No falls in past 3 months (0 pts) Confusion or Disorientation     

      Yes (5 pts) Intoxicated or Sedated No (0 pts) Impaired Gait Yes (1 pt) Mobility Assist      

      Device Used Yes (1 pt) Altered Elimination Yes (1 pt) Score/Fall Risk Level 3 or more       

      points = High Risk Oriented to surroundings, Maintained a safe environment, Remained        

      with patient while ambulating. Nutritional screening: No deficits noted.                    

                                                                                                  

Assessment:                                                                                       

12:00 General: Appears in no apparent distress. uncomfortable, slender, Behavior is           ph  

      cooperative, fussy, Denies fever. Pain: Complains of pain in "all over". Neuro: Level       

      of Consciousness is awake, alert, obeys commands, Oriented to person, place, situation.     

      Cardiovascular: Capillary refill is sluggish in bilateral fingers fingers cold to           

      touch, hx of Raynauds. Respiratory: Airway is patent Respiratory effort is even,            

      unlabored. GI: Abdomen is flat. : No signs and/or symptoms were reported regarding        

      the genitourinary system. Derm: Skin is fragile, is thin, Skin is pink, warm \T\ dry.       

13:30 Reassessment: Patient appears in no apparent distress at this time. No changes from       

      previously documented assessment.                                                           

14:41 Reassessment: [facility] at bedside for transport home.                           ph  

                                                                                                  

Vital Signs:                                                                                      

11:10  / 76; Pulse 87; Resp 17; Temp 97.7; Weight 45.36 kg; Height 5 ft. 4 in. (162.56  ll1 

      cm); Pain 10/10;                                                                            

14:07  / 80; Pulse 78; Resp 18; Temp 98.0; Pulse Ox 94% on R/A;                         ph  

11:10 Body Mass Index 17.16 (45.36 kg, 162.56 cm)                                             ll1 

                                                                                                  

ED Course:                                                                                        

11:10 Patient arrived in ED.                                                                  ll1 

11:12 Milo Mckoy MD is Attending Physician.                                                sp3 

11:13 Triage completed.                                                                       ll1 

11:14 Arm band placed on Patient placed in an exam room, on a stretcher.                      ll1 

11:17 Tawnya Becerra, RN is Primary Nurse.                                                    ph  

11:36 Initial lab(s) drawn, by me, sent to lab. Inserted saline lock: 22 gauge in left        ph  

      forearm, using aseptic technique. Blood collected.                                          

12:00 Patient has correct armband on for positive identification. Bed in low position. Call   ph  

      light in reach. Side rails up X2. Client placed on continuous cardiac and pulse             

      oximetry monitoring. NIBP monitoring applied. Door closed. Noise minimized. Warm            

      blanket given.                                                                              

12:30 XRAY Chest (1 view) In Process Unspecified.                                             EDMS

14:40 No provider procedures requiring assistance completed. IV discontinued, intact,         ph  

      bleeding controlled, No redness/swelling at site. Pressure dressing applied.                

                                                                                                  

Administered Medications:                                                                         

12:45 Drug: morphine 2 mg Route: IVP; Infused Over: 4 mins; Site: left forearm;               ph  

13:15 Follow up: Response: No adverse reaction; RASS: Alert and Calm (0)                      ph  

14:07 Drug: morphine 2 mg Route: IVP; Infused Over: 4 mins; Site: right forearm;              ph  

14:44 Follow up: Response: No adverse reaction; RASS: Alert and Calm (0)                      ph  

                                                                                                  

                                                                                                  

Medication:                                                                                       

14:42 VIS not applicable for this client.                                                     ph  

                                                                                                  

Outcome:                                                                                          

13:42 Discharge ordered by MD.                                                                sp3 

14:44 Patient left the ED.                                                                    ph  

14:44 Discharged to home via ambulance.                                                       ph  

14:44 Condition: stable                                                                           

                                                                                                  

Signatures:                                                                                       

Dispatcher [employer]                           EDMS                                                 

Tawnya Becerra, RN                      RN   ph                                                   

Antionette Balderrama RN                       RN   ll1                                                  

Milo Mckoy MD MD   sp3                                                  

                                                                                                  

**************************************************************************************************

## 2023-01-10 NOTE — RAD REPORT
EXAM DESCRIPTION:  Jarodt Single View1/10/2023 12:28 pm

 

CLINICAL HISTORY:  Chest pain

 

COMPARISON:  2021

 

FINDINGS:  Progression in diffuse bilateral pulmonary opacities.

 

Heart is mildly to moderately enlarged.

 

Calcified breast implants The heart is normal size

 

IMPRESSION:  Progression in diffuse bilateral pulmonary opacities. This probably represents a combina
tion of COPD and pulmonary fibrosis. There could also be a superimposed acute pneumonitis or intersti
tial pulmonary edema

## 2023-01-10 NOTE — XMS REPORT
Continuity of Care Document

                           Created on:January 10, 2023



Patient:MARKIE MAURICE

Sex:Female

:1950

External Reference #:[id number]





Demographics







                          Address                   [address]



                                                    [place] [address]

 

                          Home Phone                [phone] HUSB

 

                          Work Phone                [phone]

 

                          Mobile Phone              [phone]

 

                          Email Address             [email or web address]

 

                          Preferred Language        English

 

                          Marital Status            Unknown

 

                          Mormonism Affiliation     Unknown

 

                          Race                      Unknown

 

                          Additional Race(s)        White



                                                    Unavailable

 

                          Ethnic Group              Unknown









Author







                          Organization              [facility]

t

 

                          Address                   [address]



                                                    [place] [address]

 

                          Phone                     [phone]









Support







                Name            Relationship    Address         Phone

 

                David Maurice  Spouse          [address]   [phone]



                                                [place] [address] 

 

                Joan Huitron Daughter        Unavailable     [phone]

[phone]

 

                DAVID MAURICE               Unavailable     Unavailable









Care Team Providers







                    Name                Role                Phone

 

                    Fred Bacon Primary Care Physician Unavailable

 

                    SID BUCHANAN     Attending Clinician Unavailable

 

                    GITA HAMMER Attending Clinician Unavailable

 

                    RADHA CARRERO   Attending Clinician Unavailable

 

                    Doctor Unassigned, No Name Attending Clinician Unavailable

 

                    Angela-Vangieo_A_AH    Attending Clinician Unavailable

 

                    SID BUCHANAN     Admitting Clinician Unavailable

 

                    BOLIVAR MENENDEZ Admitting Clinician Unavailable

 

                    Angela-Mbayo_A_AH    Admitting Clinician Unavailable









Payers







           Payer Name Policy Type Policy Number Effective Date Expiration Date S

delilah

 

           [employer] MED[employer]ARE HMO            [id number] 2021            



           POS                              00:00:00              

 

           [facility]            [id number]  2020            



           [employer]                        00:00:00              



           (MEDICARE                                              



           REPLACEMENT/ADVANT                                             



           AGE - HMO)                                             







Problems







       Condition Condition Condition Status Onset  Resolution Last   Treating Co

mments 

Source



       Name   Details Category        Date   Date   Treatment Clinician        



                                                 Date                 

 

       Renal  Renal  Disease Active 2021                             [facility]



       atrophy atrophy               2-17                               [facility]



                                   00:00:                             [facility]



                                   00                                 [facility]

 

       Hydronephr Hydronephr Disease Active 2021                             [facility]

[facility]



       osis with osis with               2-17                               [facility]

s



       renal  renal                00:00:                             Medical



       calculous calculous               00                                 Cent

er



       obstructio obstructio                                                  



       n      n                                                       

 

       Right  Right  Disease Active 2021                             [facility]



       renal  renal                2-17                               [facility]



       stone  stone                00:00:                             Medical



                                   00                                 [facility]

 

       Acute  Acute  Disease Active                              [facility]



       cystitis cystitis                                              [facility]



       without without               00:00:                             Medical



       hematuria hematuria               00                                 Cent

er

 

       Stage 3b Stage 3b Disease Active                              [facility]

t



       chronic chronic                                              [facility]



       kidney kidney               00:00:                             Medical



       disease disease               00                                 Center

 

       Rheumatoid Rheumatoid Disease Active                              [facility]

[facility]



       arteritis arteritis                                              [facility]

s



                                   00:00:                             Medical



                                   00                                 Center

 

       Acquired Acquired Disease Active                              [facility]

[facility]



       hypothyroi hypothyroi                                              [facility]

[facility]



       dism   dism                 00:00:                             Medical



                                   00                                 [facility]

 

       Nephrolith Nephrolith Disease Active                              [facility]

[facility]



       iasis  iasis                                               [facility]



                                   00:00:                             Medical



                                   00                                 Center







Allergies, Adverse Reactions, Alerts







       Allergy Allergy Status Severity Reaction(s) Onset  Inactive Treating Comm

ents 

Source



       Name   Type                        Date   Date   Clinician        

 

       SULFA  Allergy Active        N\T\V                        [facility]



       (SULFONA                                                     [facility]



       MIDE                               00:00:                      Medical



       ANTIBIOT                             00                          Center



       ICS)                                                           

 

       NO KNOWN Allergy Active                                           [facility]



       ALLERGIE                                                         



       S                                                              







Social History







           Social Habit Start Date Stop Date  Quantity   Comments   Source

 

           Alcohol intake 2021 Ex-drinker            [facility] Layne Lynnette

es



                      00:00:00   00:00:00   (finding)             [facility]

 

           Tobacco use and 2021 Never used            [facility]

kes



           exposure   00:00:00   00:00:00                         [facility]

 

           Sex Assigned At 1950                       [facility] Layne [place]

kes



           Birth      00:00:00   00:00:00                         [facility]









                Smoking Status  Start Date      Stop Date       Source

 

                Former smoker   2021 00:00:00 2021 00:00:00 [facility]







Medications







       Ordered Filled Start  Stop   Current Ordering Indication Dosage Frequency

 Signature

                    Comments            Components          Source



     Medication Medication Date Date Medication? Clinician                (SIG) 

          



     Name Name                                                   

 

     cefUROXime      2021      Yes            250mg Q.5D Take 250           [facility]



     (CEFTIN)      2-19                               mg by           [facility]



     250 MG      10:44:                               mouth 2           Medical



     tablet      00                                 (two)           Center



                                                  times           



                                                  daily.           

 

     sulfaSALAzi      2021      Yes            1000mg Q.5D Take 1,000         

  [facility]



     ne        2-18                               mg by           [facility]



     (AZULFIDINE      10:44:                               mouth 2           Med

ical



     ) 500 mg      18                                 (two)           Center



     tablet                                         times           



                                                  daily.           

 

     pantoprazol      2021      Yes            40mg QD   Take 40 mg           

[facility]



     e         2-18                               by mouth           [facility]



     (PROTONIX)      10:44:                               daily.           Medic

al



     40 MG      18                                                Center



     tablet                                                        

 

     levothyroxi      2021      Yes            37.5ug      Take 37.5          

 [facility]



     ne        2-18                               mcg by           [facility]



     (SYNTHROID,      10:44:                               mouth           Medic

al



     LEVOTHROID)      18                                 Every           Center



     25 MCG                                         morning on           



     tablet                                         an empty           



                                                  stomach.           

 

     apixaban      2021      Yes            2.5mg Q.5D Take 2.5           [facility]



     (Eliquis)      2-18                               mg by           [facility]



     2.5 mg Tab      10:44:                               mouth 2           Medi

charmaine



     tablet      18                                 (two)           Center



                                                  times           



                                                  daily.           

 

     predniSONE      2021      Yes            20mg QD   Take 20 mg           [facility]

[facility]



     (DELTASONE)      2-18                               by mouth           [facility]

s



     20 MG      10:44:                               daily.           Medical



     tablet      18                                                Center

 

     ferrous      2021      Yes            325mg      Take 325           [facility]

t



     sulfate      2-18                               mg by           [facility]



     (iron) 325      10:44:                               mouth           Medica

l



     (65 FE) MG      18                                 daily with           [facility]

ter



     tablet                                         breakfast.           

 

     budesonide-      2021      Yes            2{puff} Q.5D Inhale 2          

 [facility]



     formoteroL      2-18                               puffs by           [facility]



     (SYMBICORT)      10:44:                               mouth via           M

edical



     160-4.5      18                                 inhaler 2           Center



     mcg/actuati                                         (two)           



     on inhaler                                         times           



                                                  daily.           

 

     tamsulosin      2021- No             .4mg QD   Take 1           [facility]



     (FLOMAX)      2-18 -17                          capsule           [facility]



     0.4 mg Cap      00:00: 23:59                          (0.4 mg           Med

ical



     24 hr      00   :00                           total) by           Center



     capsule                                         mouth           



                                                  daily for           



                                                  30 days.           







Vital Signs







             Vital Name   Observation Time Observation Value Comments     Source

 

             HEIGHT       2021 06:58:00 162.6 cm                  

 

             WEIGHT       2021 06:58:00 60.8 kg                   

 

             HEIGHT       2021 16:28:00 162.6 cm                  

 

             WEIGHT       2021 16:28:00 62.596 kg                 

 

             HEIGHT       2021 06:58:00 162.6 cm                  

 

             WEIGHT       2021 06:58:00 60.8 kg                   

 

             HEIGHT       2021 16:28:00 162.6 cm                  

 

             WEIGHT       2021 16:28:00 62.596 kg                 

 

             WEIGHT       2021-10-04 12:00:00 59.149 kg                 

 

             HEIGHT       2021-10-04 12:00:00 162.6 cm                  

 

             WEIGHT       2021-10-04 12:00:00 59.149 kg                 

 

             HEIGHT       2021-10-04 12:00:00 162.6 cm                  







Procedures

This patient has no known procedures.



Plan of Care







             Planned Activity Planned Date Details      Comments     Source

 

             Future Scheduled 2023   DEPRESSION SCREENING              [facility]



             Test         00:00:00     (12+) [code =              Medical Center



                                       DEPRESSION SCREENING              



                                       (12+)]                    

 

             Future Scheduled 2023   FALLS RISK SCREENING              [facility]



             Test         00:00:00     [code = FALLS RISK              Medical C

enter



                                       SCREENING]                

 

             Future Scheduled 2022   Tobacco Cessation              [facility]



             Test         00:00:00     Counseling and              Medical Cente

r



                                       Screening (12+) [code =              



                                       Tobacco Cessation              



                                       Counseling and              



                                       Screening (12+)]              

 

             Future Scheduled 2022   INFLUENZA VACCINE (#1)              [facility]

[facility]



             Test         00:00:00     [code = INFLUENZA              Medical Ce

nter



                                       VACCINE (#1)]              

 

             Future Scheduled 2022   MEDICARE ANNUAL              [facility]

[facility]



             Test         00:00:00     WELLNESS (YEAR 2 or              Medical 

Center



                                       FIRST YEAR if no IPPE)              



                                       [code = MEDICARE ANNUAL              



                                       WELLNESS (YEAR 2 or              



                                       FIRST YEAR if no IPPE)]              

 

             Future Scheduled 2021   COVID-19 VACCINE (3 -              [facility]



             Test         00:00:00     Booster for Pfizer              Medical C

enter



                                       series) [code =              



                                       COVID-19 VACCINE (3 -              



                                       Booster for Pfizer              



                                       series)]                  

 

             Future Scheduled 2015   PNEUMOCOCCAL 65+ YRS (1              

[facility]



             Test         00:00:00     - PCV) [code =              Medical Cente

r



                                       PNEUMOCOCCAL 65+ YRS (1              



                                       - PCV)]                   

 

             Future Scheduled 2000   SHINGLES VACCINES (1 of              

[facility]



             Test         00:00:00     2) [code = SHINGLES              Medical 

Center



                                       VACCINES (1 of 2)]              

 

             Future Scheduled 1969   DTAP/TDAP/TD VACCINES              [facility]



             Test         00:00:00     (1 - Tdap) [code =              Medical C

enter



                                       DTAP/TDAP/TD VACCINES              



                                       (1 - Tdap)]               

 

             Future Scheduled 1968   HEPATITIS C SCREENING              [facility]



             Test         00:00:00     [code = HEPATITIS C              Medical 

Center



                                       SCREENING]                

 

             Future Scheduled 1950   Screening for malignant              

[facility]



             Test         00:00:00     neoplasm of breast              Medical C

enter



                                       (procedure) [code =              



                                       050518025]                

 

             Future Scheduled 1950   CT Colonography (combo)              

[facility]



             Test         00:00:00     [code = CT Colonography              [facility]



                                       (combo)]                  

 

             Future Scheduled 1950   Screening for malignant              

[facility]



             Test         00:00:00     neoplasm of colon              Medical Ce

nter



                                       (procedure) [code =              



                                       545932205]                

 

             Future Scheduled 1950   Screening for malignant              

[facility]



             Test         00:00:00     neoplasm of colon              Medical Ce

nter



                                       (procedure) [code =              



                                       124779685]                

 

             Future Scheduled 1950   DXA SCAN [code = DXA              [facility]



             Test         00:00:00     SCAN]                     [facility]

 

             Future Scheduled 1950   Screening for malignant              

[facility]



             Test         00:00:00     neoplasm of colon              Medical Ce

nter



                                       (procedure) [code =              



                                       650804528]                

 

             Future Scheduled 1950   Screening for malignant              

[facility]



             Test         00:00:00     neoplasm of colon              Medical Ce

nter



                                       (procedure) [code =              



                                       249829319]                

 

             Future Scheduled 1950   Sigmoidoscopy [code =              [facility]



             Test         00:00:00     Sigmoidoscopy]              Medical Cente

r







Encounters







        Start   End     Encounter Admission Attending Care    Care    Encounter 

Source



        Date/Time Date/Time Type    Type    Clinicians Facility Department ID   

   

 

        [id number]

[facility]



        06:11:00 10:44:00                 SID                         

 

        2021 Outpatient [facility]    [id number]

[facility]



        14:46:49 23:59:00                                                 

 

        2021 2021-10-05 Inpatient ER      ELLYA [facility]    Surgery 

[id number] [facility]



        21:06:00 18:37:00                 GITA                         

 

        2020 Orders          Doctor SMITH    [id number]

[id number] 



        00:00:00 00:00:00 Only            Unassigned, LC   [id number]       

  



                                        [facility] [id number]         



                                                        [phone]         



                                                        009             

 

        2020 Outpatient         Angela-Jodi [facility]     [address]



        07:12:00 07:12:00                 _A_                   [id number]   Family



                                                                        Practic



                                                                        e







Results







           Test Description Test Time  Test Comments Results    Result Comments 

Source









                    HEMOGLOBIN AND HEMATOCRIT 2021 07:15:13 









                      Test Item  Value      Reference Range Interpretation Comme

nts









             HEMOGLOBIN (BEAKER) (test code = 410) 8.7 GM/DL    11.2-15.7    L  

          

 

             HEMATOCRIT (BEAKER) (test code = 411) 27.7 %       34.1-44.9    L  

          



 ID - [id number] CULTURE + SMEAR (NON-SPUTUM)2021 12:08:57





             Test Item    Value        Reference Range Interpretation Comments

 

             CULTURE (BEAKER) (test No acid-fast bacilli                        

   



             code = 1095) isolated in 42 days                           

 

             AFB SMEAR (BEAKER) No acid fast bacilli                           



             (test code = 994) seen                                   



FUNGUS CULTURE + SMEAR2021-10-28 01:15:05





             Test Item    Value        Reference Range Interpretation Comments

 

             CULTURE (BEAKER) (test No fungus isolated in                       

    



             code = 1095) 28 days                                

 

             FUNGUS SMEAR (BEAKER) No fungi seen                           



             (test code = 1406)                                        



BLOOD CULTURE2021-10-06 20:01:08





             Test Item    Value        Reference Range Interpretation Comments

 

             CULTURE (BEAKER) (test No growth in 5 days                         

  



             code = 1095)                                        



The specimen volume collected for this blood culture was below the optimum (10 
mL per bottle or 20 mL total). Use of lower volumes may adversely affect 
recovery and/or detection times of some organisms.SURGICALLY OBTAINED CULTURE + 
GRAM STAIN2021-10-05 12:12:16





             Test Item    Value        Reference    Interpretation Comments



                                       Range                     

 

             CULTURE (BEAKER) (test ENTEROCOCCUS              A            3+ En

terococcus



             code = 1095) FAECALIS                               faecalis

 

             Ampicillin (test code              See_Comment  S             [Auto

mated



             = 26)                                               message] The



                                                                 system which



                                                                 generated this



                                                                 result



                                                                 transmitted



                                                                 reference range

:



                                                                 Susceptible 0-8

 ,



                                                                 Resistant <0 or



                                                                 >8 . The



                                                                 reference range



                                                                 was not used to



                                                                 interpret this



                                                                 result as



                                                                 normal/abnormal

.

 

             Linezolid (test code =              See_Comment  S             [Aut

omated



             40)                                                 message] The



                                                                 system which



                                                                 generated this



                                                                 result



                                                                 transmitted



                                                                 reference range

:



                                                                 Susceptible 0-2

 ,



                                                                 Resistant <0 or



                                                                 >2 . The



                                                                 reference range



                                                                 was not used to



                                                                 interpret this



                                                                 result as



                                                                 normal/abnormal

.

 

             Vancomycin (test code              See_Comment  S             [Auto

mated



             = 13)                                               message] The



                                                                 system which



                                                                 generated this



                                                                 result



                                                                 transmitted



                                                                 reference range

:



                                                                 Susceptible 0-4

 ,



                                                                 Resistant <0 or



                                                                 >4 . The



                                                                 reference range



                                                                 was not used to



                                                                 interpret this



                                                                 result as



                                                                 normal/abnormal

.

 

             CULTURE (BEAKER) (test PROTEUS MIRABILIS              A            

3+ Proteus



             code = 1095)                                        mirabilis

 

             Amikacin (test code =                           S            



             1)                                                  

 

             Ampicillin + Sulbactam                           S            



             (test code = 6)                                        

 

             Aztreonam (test code =                           S            



             32)                                                 

 

             Cefepime (test code =                           S            



             51)                                                 

 

             Cefoxitin (test code =                           S            



             68)                                                 

 

             Ceftazidime (test code                           S            



             = 27)                                               

 

             Ceftriaxone (test code                           S            



             = 52)                                               

 

             Ertapenem (test code =                           S            



             38)                                                 

 

             Gentamicin (test code                           S            



             = 18)                                               

 

             Levofloxacin (test                           S            



             code = 22)                                          

 

             Meropenem (test code =                           S            



             34)                                                 

 

             Nitrofurantoin (test                           R            



             code = 23)                                          

 

             Piperacillin +                           S            



             Tazobactam (test code                                        



             = 29)                                               

 

             Tetracycline (test                           R            



             code = 2)                                           

 

             Tobramycin (test code                           S            



             = 25)                                               

 

             Trimethoprim +                           S            



             Sulfamethoxazole (test                                        



             code = 47)                                          

 

             GRAM STAIN RESULT <1+ WBCs                               



             (BEAKER) (test code =                                        



             1123)                                               

 

             GRAM STAIN RESULT No organisms seen                           



             (BEAKER) (test code =                                        



             879302)                                             



BASIC METABOLIC PANEL2021-10-05 06:57:48





             Test Item    Value        Reference Range Interpretation Comments

 

             SODIUM (BEAKER) 137 meq/L    136-145                   



             (test code = 381)                                        

 

             POTASSIUM (BEAKER) 4.0 meq/L    3.5-5.1                   



             (test code = 379)                                        

 

             CHLORIDE (BEAKER) 109 meq/L           H            



             (test code = 382)                                        

 

             CO2 (BEAKER) (test 21 meq/L     22-29        L            



             code = 355)                                         

 

             BLOOD UREA NITROGEN 15 mg/dL     7-21                      



             (BEAKER) (test code                                        



             = 354)                                              

 

             CREATININE (BEAKER) 1.04 mg/dL   0.57-1.25                 



             (test code = 358)                                        

 

             GLUCOSE RANDOM 73 mg/dL                         



             (BEAKER) (test code                                        



             = 652)                                              

 

             CALCIUM (BEAKER) 9.4 mg/dL    8.4-10.2                  



             (test code = 697)                                        

 

             EGFR (BEAKER) (test 52 mL/min/1.73                           ESTIMA

BRAEDEN GFR IS



             code = 1092) sq m                                   NOT AS ACCURATE

 AS



                                                                 CREATININE



                                                                 CLEARANCE IN



                                                                 PREDICTING



                                                                 GLOMERULAR



                                                                 FILTRATION RATE

.



                                                                 ESTIMATED GFR I

S



                                                                 NOT APPLICABLE 

FOR



                                                                 DIALYSIS PATIEN

TS.



 ID - MAU FCBC W/PLT COUNT &amp; AUTO DIFFERENTIAL2021-10-05 
06:08:09





             Test Item    Value        Reference Range Interpretation Comments

 

             WHITE BLOOD CELL COUNT (BEAKER) 6.8 K/ L     3.5-10.5              

    



             (test code = 775)                                        

 

             RED BLOOD CELL COUNT (BEAKER) 2.95 M/ L    3.93-5.22    L          

  



             (test code = 761)                                        

 

             HEMOGLOBIN (BEAKER) (test code = 8.9 GM/DL    11.2-15.7    L       

     



             410)                                                

 

             HEMATOCRIT (BEAKER) (test code = 28.5 %       34.1-44.9    L       

     



             411)                                                

 

             MEAN CORPUSCULAR VOLUME (BEAKER) 96.6 fL      79.4-94.8    H       

     



             (test code = 753)                                        

 

             MEAN CORPUSCULAR HEMOGLOBIN 30.2 pg      25.6-32.2                 



             (BEAKER) (test code = 751)                                        

 

             MEAN CORPUSCULAR HEMOGLOBIN CONC 31.2 GM/DL   32.2-35.5    L       

     



             (BEAKER) (test code = 752)                                        

 

             RED CELL DISTRIBUTION WIDTH 18.2 %       11.7-14.4    H            



             (BEAKER) (test code = 412)                                        

 

             PLATELET COUNT (BEAKER) (test 133 K/CU MM  150-450      L          

  



             code = 756)                                         

 

             MEAN PLATELET VOLUME (BEAKER) 10.9 fL      9.4-12.3                

  



             (test code = 754)                                        

 

             NUCLEATED RED BLOOD CELLS 0 /100 WBC   0-0                       



             (BEAKER) (test code = 413)                                        

 

             NEUTROPHILS RELATIVE PERCENT 55 %                                  

 



             (BEAKER) (test code = 429)                                        

 

             LYMPHOCYTES RELATIVE PERCENT 25 %                                  

 



             (BEAKER) (test code = 430)                                        

 

             MONOCYTES RELATIVE PERCENT 11 %                                   



             (BEAKER) (test code = 431)                                        

 

             EOSINOPHILS RELATIVE PERCENT 3 %                                   

 



             (BEAKER) (test code = 432)                                        

 

             BASOPHILS RELATIVE PERCENT 0 %                                    



             (BEAKER) (test code = 437)                                        

 

             NEUTROPHILS ABSOLUTE COUNT 3.70 K/ L    1.56-6.13                 



             (BEAKER) (test code = 670)                                        

 

             LYMPHOCYTES ABSOLUTE COUNT 1.71 K/ L    1.18-3.74                 



             (BEAKER) (test code = 414)                                        

 

             MONOCYTES ABSOLUTE COUNT (BEAKER) 0.75 K/ L    0.24-0.36    H      

      



             (test code = 415)                                        

 

             EOSINOPHILS ABSOLUTE COUNT 0.21 K/ L    0.04-0.36                 



             (BEAKER) (test code = 416)                                        

 

             BASOPHILS ABSOLUTE COUNT (BEAKER) 0.02 K/ L    0.01-0.08           

      



             (test code = 417)                                        

 

             IMMATURE GRANULOCYTES-RELATIVE 5 %          0-1          H         

   



             PERCENT (BEAKER) (test code =                                      

  



             2801)                                               



BASIC METABOLIC PANEL2021-10-04 02:59:06





             Test Item    Value        Reference Range Interpretation Comments

 

             SODIUM (BEAKER) 136 meq/L    136-145                   



             (test code = 381)                                        

 

             POTASSIUM (BEAKER) 4.8 meq/L    3.5-5.1                   Specimen 

slightly



             (test code = 379)                                        hemolyzed

 

             CHLORIDE (BEAKER) 110 meq/L           H            



             (test code = 382)                                        

 

             CO2 (BEAKER) (test 18 meq/L     22-29        L            



             code = 355)                                         

 

             BLOOD UREA NITROGEN 15 mg/dL     7-21                      



             (BEAKER) (test code                                        



             = 354)                                              

 

             CREATININE (BEAKER) 0.90 mg/dL   0.57-1.25                 Specimen

 slightly



             (test code = 358)                                        hemolyzed

 

             GLUCOSE RANDOM 78 mg/dL                         



             (BEAKER) (test code                                        



             = 652)                                              

 

             CALCIUM (BEAKER) 9.4 mg/dL    8.4-10.2                  



             (test code = 697)                                        

 

             EGFR (BEAKER) (test 62 mL/min/1.73                           ESTIMA

BRAEDEN GFR IS



             code = 1092) sq m                                   NOT AS ACCURATE

 AS



                                                                 CREATININE



                                                                 CLEARANCE IN



                                                                 PREDICTING



                                                                 GLOMERULAR



                                                                 FILTRATION RATE

.



                                                                 ESTIMATED GFR I

S



                                                                 NOT APPLICABLE 

FOR



                                                                 DIALYSIS PATIEN

TS.



 ID - DBCBC W/PLT COUNT &amp; AUTO DIFFERENTIAL2021-10-04 02:30:27





             Test Item    Value        Reference Range Interpretation Comments

 

             WHITE BLOOD CELL COUNT (BEAKER) 6.9 K/ L     3.5-10.5              

    



             (test code = 775)                                        

 

             RED BLOOD CELL COUNT (BEAKER) 2.63 M/ L    3.93-5.22    L          

  



             (test code = 761)                                        

 

             HEMOGLOBIN (BEAKER) (test code = 8.1 GM/DL    11.2-15.7    L       

     



             410)                                                

 

             HEMATOCRIT (BEAKER) (test code = 25.1 %       34.1-44.9    L       

     



             411)                                                

 

             MEAN CORPUSCULAR VOLUME (BEAKER) 95.4 fL      79.4-94.8    H       

     



             (test code = 753)                                        

 

             MEAN CORPUSCULAR HEMOGLOBIN 30.8 pg      25.6-32.2                 



             (BEAKER) (test code = 751)                                        

 

             MEAN CORPUSCULAR HEMOGLOBIN CONC 32.3 GM/DL   32.2-35.5            

     



             (BEAKER) (test code = 752)                                        

 

             RED CELL DISTRIBUTION WIDTH 18.0 %       11.7-14.4    H            



             (BEAKER) (test code = 412)                                        

 

             PLATELET COUNT (BEAKER) (test 131 K/CU MM  150-450      L          

  



             code = 756)                                         

 

             MEAN PLATELET VOLUME (BEAKER) 10.2 fL      9.4-12.3                

  



             (test code = 754)                                        

 

             NUCLEATED RED BLOOD CELLS 0 /100 WBC   0-0                       



             (BEAKER) (test code = 413)                                        

 

             NEUTROPHILS RELATIVE PERCENT 70 %                                  

 



             (BEAKER) (test code = 429)                                        

 

             LYMPHOCYTES RELATIVE PERCENT 21 %                                  

 



             (BEAKER) (test code = 430)                                        

 

             MONOCYTES RELATIVE PERCENT 7 %                                    



             (BEAKER) (test code = 431)                                        

 

             EOSINOPHILS RELATIVE PERCENT 2 %                                   

 



             (BEAKER) (test code = 432)                                        

 

             BASOPHILS RELATIVE PERCENT 0 %                                    



             (BEAKER) (test code = 437)                                        

 

             NEUTROPHILS ABSOLUTE COUNT 4.77 K/ L    1.56-6.13                 



             (BEAKER) (test code = 670)                                        

 

             LYMPHOCYTES ABSOLUTE COUNT 1.41 K/ L    1.18-3.74                 



             (BEAKER) (test code = 414)                                        

 

             MONOCYTES ABSOLUTE COUNT (BEAKER) 0.48 K/ L    0.24-0.36    H      

      



             (test code = 415)                                        

 

             EOSINOPHILS ABSOLUTE COUNT 0.12 K/ L    0.04-0.36                 



             (BEAKER) (test code = 416)                                        

 

             BASOPHILS ABSOLUTE COUNT (BEAKER) 0.01 K/ L    0.01-0.08           

      



             (test code = 417)                                        

 

             IMMATURE GRANULOCYTES-RELATIVE 1 %          0-1                    

   



             PERCENT (BEAKER) (test code =                                      

  



             2801)                                               



ANAEROBIC [id number] 11:36:21





             Test Item    Value        Reference Range Interpretation Comments

 

             CULTURE (BEAKER) (test No anaerobes isolated                       

    



             code = 1095)                                        



CBC W/PLT COUNT &amp; AUTO DIFFERENTIAL2021-10-03 07:01:08





             Test Item    Value        Reference Range Interpretation Comments

 

             WHITE BLOOD CELL COUNT 7.6 K/ L     3.5-10.5                  



             (BEAKER) (test code =                                        



             775)                                                

 

             RED BLOOD CELL COUNT 2.54 M/ L    3.93-5.22    L            



             (BEAKER) (test code =                                        



             761)                                                

 

             HEMOGLOBIN (BEAKER) 7.7 GM/DL    11.2-15.7    L            



             (test code = 410)                                        

 

             HEMATOCRIT (BEAKER) 24.5 %       34.1-44.9    L            



             (test code = 411)                                        

 

             MEAN CORPUSCULAR 96.5 fL      79.4-94.8    H            Discordant 

results



             VOLUME (BEAKER) (test                                        compar

ed to



             code = 753)                                         previous, clini

charmaine



                                                                 correlation



                                                                 required.

 

             MEAN CORPUSCULAR 30.3 pg      25.6-32.2                 



             HEMOGLOBIN (BEAKER)                                        



             (test code = 751)                                        

 

             MEAN CORPUSCULAR 31.4 GM/DL   32.2-35.5    L            



             HEMOGLOBIN CONC                                        



             (BEAKER) (test code =                                        



             752)                                                

 

             RED CELL DISTRIBUTION 18.5 %       11.7-14.4    H            



             WIDTH (BEAKER) (test                                        



             code = 412)                                         

 

             PLATELET COUNT 106 K/CU MM  150-450      L            



             (BEAKER) (test code =                                        



             756)                                                

 

             MEAN PLATELET VOLUME 10.8 fL      9.4-12.3                  



             (BEAKER) (test code =                                        



             754)                                                

 

             NUCLEATED RED BLOOD 0 /100 WBC   0-0                       



             CELLS (BEAKER) (test                                        



             code = 413)                                         

 

             NEUTROPHILS RELATIVE 82 %                                   



             PERCENT (BEAKER) (test                                        



             code = 429)                                         

 

             LYMPHOCYTES RELATIVE 13 %                                   



             PERCENT (BEAKER) (test                                        



             code = 430)                                         

 

             MONOCYTES RELATIVE 4 %                                    



             PERCENT (BEAKER) (test                                        



             code = 431)                                         

 

             EOSINOPHILS RELATIVE 1 %                                    



             PERCENT (BEAKER) (test                                        



             code = 432)                                         

 

             BASOPHILS RELATIVE 0 %                                    



             PERCENT (BEAKER) (test                                        



             code = 437)                                         

 

             NEUTROPHILS ABSOLUTE 6.22 K/ L    1.56-6.13    H            



             COUNT (BEAKER) (test                                        



             code = 670)                                         

 

             LYMPHOCYTES ABSOLUTE 0.97 K/ L    1.18-3.74    L            



             COUNT (BEAKER) (test                                        



             code = 414)                                         

 

             MONOCYTES ABSOLUTE 0.27 K/ L    0.24-0.36                 



             COUNT (BEAKER) (test                                        



             code = 415)                                         

 

             EOSINOPHILS ABSOLUTE 0.08 K/ L    0.04-0.36                 



             COUNT (BEAKER) (test                                        



             code = 416)                                         

 

             BASOPHILS ABSOLUTE 0.01 K/ L    0.01-0.08                 



             COUNT (BEAKER) (test                                        



             code = 417)                                         

 

             IMMATURE     1 %          0-1                       



             GRANULOCYTES-RELATIVE                                        



             PERCENT (BEAKER) (test                                        



             code = 2801)                                        



BASIC METABOLIC PANEL2021-10-03 06:54:01





             Test Item    Value        Reference Range Interpretation Comments

 

             SODIUM (BEAKER) 137 meq/L    136-145                   



             (test code = 381)                                        

 

             POTASSIUM (BEAKER) 4.6 meq/L    3.5-5.1                   



             (test code = 379)                                        

 

             CHLORIDE (BEAKER) 114 meq/L           H            



             (test code = 382)                                        

 

             CO2 (BEAKER) (test 18 meq/L     22-29        L            



             code = 355)                                         

 

             BLOOD UREA NITROGEN 19 mg/dL     7-21                      



             (BEAKER) (test code                                        



             = 354)                                              

 

             CREATININE (BEAKER) 1.15 mg/dL   0.57-1.25                 



             (test code = 358)                                        

 

             GLUCOSE RANDOM 72 mg/dL                         



             (BEAKER) (test code                                        



             = 652)                                              

 

             CALCIUM (BEAKER) 9.9 mg/dL    8.4-10.2                  



             (test code = 697)                                        

 

             EGFR (BEAKER) (test 47 mL/min/1.73                           ESTIMA

BRAEDEN GFR IS



             code = 1092) sq m                                   NOT AS ACCURATE

 AS



                                                                 CREATININE



                                                                 CLEARANCE IN



                                                                 PREDICTING



                                                                 GLOMERULAR



                                                                 FILTRATION RATE

.



                                                                 ESTIMATED GFR I

S



                                                                 NOT APPLICABLE 

FOR



                                                                 DIALYSIS PATIEN

TS.



 ID Jonel PARKERVITAMIN B12 AND FOLATE2021-10-02 15:03:06





             Test Item    Value        Reference Range Interpretation Comments

 

             VITAMIN B12 (BEAKER) 347 pg/mL    213-816                   



             (test code = 774)                                        

 

             FOLATE (BEAKER) 1.70 ng/mL   See_Comment  L             [Automated 

message]



             (test code = 362)                                        The system

 which



                                                                 generated this 

result



                                                                 transmitted ref

erence



                                                                 range: >=7.00. 

The



                                                                 reference range

 was not



                                                                 used to interpr

et this



                                                                 result as



                                                                 normal/abnormal

.



 ID Jonel PICKENS GFERRITIN2021-10-02 15:03:05





             Test Item    Value        Reference Range Interpretation Comments

 

             FERRITIN (BEAKER) (test code = 351.19 ng/mL 5..00  H         

   



             361)                                                



 ID - CELIO JENKINS, TIBC, % SAT. (WITHOUT FERRITIN)2021-10-02 14:27:22





             Test Item    Value        Reference Range Interpretation Comments

 

             IRON (BEAKER) (test code = 547) 14.0 ug/dL   40.0-160.0   L        

    

 

             TOTAL IRON BINDING CAPACITY 149 ug/dL    250-450      L            



             (BEAKER) (test code = 769)                                        

 

             IRON % SATURATION (2) (BEAKER) 9 %          20-55        L         

   



             (test code = 2590)                                        



 ID Jonel PICKENS GCBC W/PLT COUNT &amp; AUTO DIFFERENTIAL2021-10-02 07:58:36





             Test Item    Value        Reference Range Interpretation Comments

 

             WHITE BLOOD CELL COUNT (BEAKER) 10.1 K/ L    3.5-10.5              

    



             (test code = 775)                                        

 

             RED BLOOD CELL COUNT (BEAKER) 2.83 M/ L    3.93-5.22    L          

  



             (test code = 761)                                        

 

             HEMOGLOBIN (BEAKER) (test code = 8.7 GM/DL    11.2-15.7    L       

     



             410)                                                

 

             HEMATOCRIT (BEAKER) (test code = 29.8 %       34.1-44.9    L       

     



             411)                                                

 

             MEAN CORPUSCULAR VOLUME (BEAKER) 105.3 fL     79.4-94.8    H       

     



             (test code = 753)                                        

 

             MEAN CORPUSCULAR HEMOGLOBIN 30.7 pg      25.6-32.2                 



             (BEAKER) (test code = 751)                                        

 

             MEAN CORPUSCULAR HEMOGLOBIN CONC 29.2 GM/DL   32.2-35.5    L       

     



             (BEAKER) (test code = 752)                                        

 

             RED CELL DISTRIBUTION WIDTH 19.0 %       11.7-14.4    H            



             (BEAKER) (test code = 412)                                        

 

             PLATELET COUNT (BEAKER) (test 102 K/CU MM  150-450      L          

  



             code = 756)                                         

 

             MEAN PLATELET VOLUME (BEAKER) 11.8 fL      9.4-12.3                

  



             (test code = 754)                                        

 

             NUCLEATED RED BLOOD CELLS 0 /100 WBC   0-0                       



             (BEAKER) (test code = 413)                                        

 

             NEUTROPHILS RELATIVE PERCENT 90 %                                  

 



             (BEAKER) (test code = 429)                                        

 

             LYMPHOCYTES RELATIVE PERCENT 7 %                                   

 



             (BEAKER) (test code = 430)                                        

 

             MONOCYTES RELATIVE PERCENT 3 %                                    



             (BEAKER) (test code = 431)                                        

 

             EOSINOPHILS RELATIVE PERCENT 1 %                                   

 



             (BEAKER) (test code = 432)                                        

 

             BASOPHILS RELATIVE PERCENT 0 %                                    



             (BEAKER) (test code = 437)                                        

 

             NEUTROPHILS ABSOLUTE COUNT 9.00 K/ L    1.56-6.13    H            



             (BEAKER) (test code = 670)                                        

 

             LYMPHOCYTES ABSOLUTE COUNT 0.70 K/ L    1.18-3.74    L            



             (BEAKER) (test code = 414)                                        

 

             MONOCYTES ABSOLUTE COUNT (BEAKER) 0.26 K/ L    0.24-0.36           

      



             (test code = 415)                                        

 

             EOSINOPHILS ABSOLUTE COUNT 0.05 K/ L    0.04-0.36                 



             (BEAKER) (test code = 416)                                        

 

             BASOPHILS ABSOLUTE COUNT (BEAKER) 0.00 K/ L    0.01-0.08    L      

      



             (test code = 417)                                        

 

             IMMATURE GRANULOCYTES-RELATIVE 0 %          0-1                    

   



             PERCENT (BEAKER) (test code =                                      

  



             2801)                                               



BASIC METABOLIC PANEL2021-10-02 07:02:31





             Test Item    Value        Reference Range Interpretation Comments

 

             SODIUM (BEAKER) 132 meq/L    136-145      L            



             (test code = 381)                                        

 

             POTASSIUM (BEAKER) 5.0 meq/L    3.5-5.1                   



             (test code = 379)                                        

 

             CHLORIDE (BEAKER) 111 meq/L           H            



             (test code = 382)                                        

 

             CO2 (BEAKER) (test 14 meq/L     22-29        L            



             code = 355)                                         

 

             BLOOD UREA NITROGEN 26 mg/dL     7-21         H            



             (BEAKER) (test code                                        



             = 354)                                              

 

             CREATININE (BEAKER) 1.65 mg/dL   0.57-1.25    H            



             (test code = 358)                                        

 

             GLUCOSE RANDOM 71 mg/dL                         



             (BEAKER) (test code                                        



             = 652)                                              

 

             CALCIUM (BEAKER) 9.4 mg/dL    8.4-10.2                  



             (test code = 697)                                        

 

             EGFR (BEAKER) (test 31 mL/min/1.73                           ESTIMA

BRAEDEN GFR IS



             code = 1092) sq m                                   NOT AS ACCURATE

 AS



                                                                 CREATININE



                                                                 CLEARANCE IN



                                                                 PREDICTING



                                                                 GLOMERULAR



                                                                 FILTRATION RATE

.



                                                                 ESTIMATED GFR I

S



                                                                 NOT APPLICABLE 

FOR



                                                                 DIALYSIS PATIEN

TS.



 ID - DBCT, ABDOMEN2021-10-01 19:04:00s/p right ureteral stent placement
Unlisted Reason for Exam - Click Yes and Enter Reason Below-&gt;YesUnlisted 
Reason for Exam-&gt;intra abdominal bleedingWill this procedure require oral 
contrast?-&gt;No************************************************************[facility] : 1950 Sex: 
F************************************************************FINAL REPORT 
PATIENT ID: [id number] ABDOMINAL AND PELVIS CT DATED 10/1/2021 CLINICAL 
INFORMATION: Hypotensionintra abdominal bleeding TECHNIQUE: Axial images of the 
abdomen and pelvis were obtained from diaphragm to the pubic symphysis without 
GI or intravenous contrast. This exam was performed according to our 
departmental dose-optimization program, which includes automated exposure 
control, adjustment of the mA and/or kV according to patient size and/or use of 
interactive reconstruction technique. COMMENT: There is trace bilateral pleural 
effusion. Interstitial pulmonary disease is seen in both lung bases with 
honeycombing formation suggestive of fibrotic changes. Liver and spleen are 
normal in size without focal abnormality. Gallbladder is distended. No gallstone
or biliary dilatation is noted. Pancreas and adrenals are unremarkable. Left 
kidney is atrophic. There is compensatory enlargement of the right kidney. A 
double-J ureter stent is seen on the right. No hydronephrosis or hydroureter is 
seen. Several cysts are seen in the right kidney with largest measuring 1.9 cm. 
Diverticular disease is seen in the large bowel without diverticulitis. The 
small bowel and appendix are normal in caliber. Vascular calcification is seen 
in the abdominal aorta and bilateral iliac arteries. Uterus is atrophic. The 
urinary bladder is minimally distended. Normal in caliber. No mass, adenopathy 
or ascites is present. IMPRESSION: 1. Trace bilateral pleural effusion.2. 
Findings suggestive of pulmonary fibrosis.3. Atrophic left kidney with 
compensatory enlargement of the right kidney.3. Diverticulosis without div
erticulitis.4. No hematoma or hemorrhage in the abdomen or pelvis. Signed: Catherine Mars MDReport Verified Date/Time: 10/01/2021 19:04:40 Reading Location: [address] Body Reading Room Electronically signed by: CATHERINE MARS M.D. on 
10/01/2021 07:04 [id number] 16:57:43





             Test Item    Value        Reference Range Interpretation Comments

 

             CORTISOL, TOTAL (BEAKER) (test code 6.2 ug/dL    3.7-19.4          

        



             = 2755)                                             



 ID - DBCOMPREHENSIVE METABOLIC PANEL2021-10-01 16:55:00





             Test Item    Value        Reference Range Interpretation Comments

 

             TOTAL PROTEIN 4.6 gm/dL    6.0-8.3      L            



             (BEAKER) (test code =                                        



             770)                                                

 

             ALBUMIN (BEAKER) 2.5 g/dL     3.5-5.0      L            



             (test code = 1145)                                        

 

             ALKALINE PHOSPHATASE 60 U/L                           



             (BEAKER) (test code =                                        



             346)                                                

 

             BILIRUBIN TOTAL 0.3 mg/dL    0.2-1.2                   



             (BEAKER) (test code =                                        



             377)                                                

 

             SODIUM (BEAKER) (test 133 meq/L    136-145      L            



             code = 381)                                         

 

             POTASSIUM (BEAKER) 4.1 meq/L    3.5-5.1                   



             (test code = 379)                                        

 

             CHLORIDE (BEAKER) 110 meq/L           H            



             (test code = 382)                                        

 

             CO2 (BEAKER) (test 15 meq/L     22-29        L            



             code = 355)                                         

 

             BLOOD UREA NITROGEN 26 mg/dL     7-21         H            



             (BEAKER) (test code =                                        



             354)                                                

 

             CREATININE (BEAKER) 1.73 mg/dL   0.57-1.25    H            



             (test code = 358)                                        

 

             GLUCOSE RANDOM 64 mg/dL            L            



             (BEAKER) (test code =                                        



             652)                                                

 

             CALCIUM (BEAKER) 8.1 mg/dL    8.4-10.2     L            



             (test code = 697)                                        

 

             AST (SGOT) (BEAKER) 15 U/L       5-34                      



             (test code = 353)                                        

 

             ALT (SGPT) (BEAKER) 8 U/L        6-55                      



             (test code = 347)                                        

 

             EGFR (BEAKER) (test 29 mL/min/1.73                           ESTIMA

BRAEDEN GFR IS



             code = 1092) sq m                                   NOT AS ACCURATE

 AS



                                                                 CREATININE



                                                                 CLEARANCE IN



                                                                 PREDICTING



                                                                 GLOMERULAR



                                                                 FILTRATION RATE

.



                                                                 ESTIMATED GFR I

S



                                                                 NOT APPLICABLE 

FOR



                                                                 DIALYSIS PATIEN

TS.



 ID - DB(CELLAVISION MANUAL DIFF)2021-10-01 16:49:11





             Test Item    Value        Reference Range Interpretation Comments

 

             NEUTROPHILS - REL 98 %                                   



             (CELLAVISION)(BEAKER) (test code =                                 

       



             2816)                                               

 

             LYMPHOCYTES - REL 1 %                                    



             (CELLAVISION)(BEAKER) (test code =                                 

       



             2817)                                               

 

             BANDS - REL (CELLAVISION)(BEAKER) 1 %          0-10                

      



             (test code = 2826)                                        

 

             NEUTROPHILS - ABS 11.07 K/ul   1.56-6.13    H            



             (CELLAVISION)(BEAKER) (test code =                                 

       



             2830)                                               

 

             LYMPHOCYTES - ABS 0.11 K/ul    1.18-3.74    L            



             (CELLAVISION)(BEAKER) (test code =                                 

       



             2831)                                               

 

             BANDS - ABS (CELLAVISION)(BEAKER) 0.11 K/uL    0.00-0.80           

      



             (test code = 2840)                                        

 

             TOTAL COUNTED (BEAKER) (test code 100                              

      



             = 1351)                                             

 

             PLT MORPHOLOGY (BEAKER) (test code Normal                          

       



             = 486)                                              

 

             VACUOLATED NEUTROPHILS (BEAKER) Present                            

    



             (test code = 483)                                        

 

             ANISOCYTOSIS (BEAKER) (test code = 1+ few                          

       



             961)                                                

 

             MACROCYTES (BEAKER) (test code = 1+ few                            

     



             964)                                                

 

             POIKILOCYTES (BEAKER) (test code = 1+ few                          

       



             966)                                                

 

             BRIANNA CELLS (BEAKER) (test code = 1+ few                            

     



             474)                                                

 

             ARTIFACT (CELLAVISION)(BEAKER) Present                             

   



             (test code = 3432)                                        

 

             PLATELET CONCENTRATION Decreased                              



             (CELLAVISION)(BEAKER) (test code =                                 

       



             3438)                                               



 ID - Hodan Delarosa comments: Slide comments:C-REACTIVE PROTEIN
2021-10-01 16:37:03





             Test Item    Value        Reference Range Interpretation Comments

 

             C-REACTIVE PROTEIN (BEAKER) (test 16.36 mg/dL  0.00-0.50    H      

      



             code = 676)                                         



 ID - DBLACTIC ACID, VENOUS2021-10-01 16:32:57





             Test Item    Value        Reference Range Interpretation Comments

 

             LACTATE BLOOD VENOUS (2) (BEAKER) 1.25 mmol/L  0.50-2.20           

      



             (test code = 2872)                                        



 ID - DBCBC W/PLT COUNT &amp; AUTO DIFFERENTIAL2021-10-01 16:22:05





             Test Item    Value        Reference Range Interpretation Comments

 

             WHITE BLOOD CELL COUNT (BEAKER) 11.3 K/ L    3.5-10.5     H        

    



             (test code = 775)                                        

 

             RED BLOOD CELL COUNT (BEAKER) 2.66 M/ L    3.93-5.22    L          

  



             (test code = 761)                                        

 

             HEMOGLOBIN (BEAKER) (test code = 8.1 GM/DL    11.2-15.7    L       

     



             410)                                                

 

             HEMATOCRIT (BEAKER) (test code = 26.9 %       34.1-44.9    L       

     



             411)                                                

 

             MEAN CORPUSCULAR VOLUME (BEAKER) 101.1 fL     79.4-94.8    H       

     



             (test code = 753)                                        

 

             MEAN CORPUSCULAR HEMOGLOBIN 30.5 pg      25.6-32.2                 



             (BEAKER) (test code = 751)                                        

 

             MEAN CORPUSCULAR HEMOGLOBIN CONC 30.1 GM/DL   32.2-35.5    L       

     



             (BEAKER) (test code = 752)                                        

 

             RED CELL DISTRIBUTION WIDTH 18.6 %       11.7-14.4    H            



             (BEAKER) (test code = 412)                                        

 

             PLATELET COUNT (BEAKER) (test 100 K/CU MM  150-450      L          

  



             code = 756)                                         

 

             MEAN PLATELET VOLUME (BEAKER) 12.2 fL      9.4-12.3                

  



             (test code = 754)                                        

 

             NUCLEATED RED BLOOD CELLS 0 /100 WBC   0-0                       



             (BEAKER) (test code = 413)                                        



CBC W/PLT COUNT &amp; AUTO DIFFERENTIAL2021-10-01 12:28:55





             Test Item    Value        Reference Range Interpretation Comments

 

             WHITE BLOOD CELL COUNT (BEAKER) 10.8 K/ L    3.5-10.5     H        

    



             (test code = 775)                                        

 

             RED BLOOD CELL COUNT (BEAKER) 2.48 M/ L    3.93-5.22    L          

  



             (test code = 761)                                        

 

             HEMOGLOBIN (BEAKER) (test code = 7.7 GM/DL    11.2-15.7    L       

     



             410)                                                

 

             HEMATOCRIT (BEAKER) (test code = 24.9 %       34.1-44.9    L       

     



             411)                                                

 

             MEAN CORPUSCULAR VOLUME (BEAKER) 100.4 fL     79.4-94.8    H       

     



             (test code = 753)                                        

 

             MEAN CORPUSCULAR HEMOGLOBIN 31.0 pg      25.6-32.2                 



             (BEAKER) (test code = 751)                                        

 

             MEAN CORPUSCULAR HEMOGLOBIN CONC 30.9 GM/DL   32.2-35.5    L       

     



             (BEAKER) (test code = 752)                                        

 

             RED CELL DISTRIBUTION WIDTH 18.5 %       11.7-14.4    H            



             (BEAKER) (test code = 412)                                        

 

             PLATELET COUNT (BEAKER) (test code 83 K/CU MM   150-450      L     

       



             = 756)                                              

 

             MEAN PLATELET VOLUME (BEAKER) 10.9 fL      9.4-12.3                

  



             (test code = 754)                                        

 

             NUCLEATED RED BLOOD CELLS (BEAKER) 0 /100 WBC   0-0                

       



             (test code = 413)                                        

 

             NEUTROPHILS RELATIVE PERCENT 93 %                                  

 



             (BEAKER) (test code = 429)                                        

 

             LYMPHOCYTES RELATIVE PERCENT 3 %                                   

 



             (BEAKER) (test code = 430)                                        

 

             MONOCYTES RELATIVE PERCENT 2 %                                    



             (BEAKER) (test code = 431)                                        

 

             EOSINOPHILS RELATIVE PERCENT 1 %                                   

 



             (BEAKER) (test code = 432)                                        

 

             BASOPHILS RELATIVE PERCENT 0 %                                    



             (BEAKER) (test code = 437)                                        

 

             NEUTROPHILS ABSOLUTE COUNT 10.02 K/ L   1.56-6.13    H            



             (BEAKER) (test code = 670)                                        

 

             LYMPHOCYTES ABSOLUTE COUNT 0.30 K/ L    1.18-3.74    L            



             (BEAKER) (test code = 414)                                        

 

             MONOCYTES ABSOLUTE COUNT (BEAKER) 0.20 K/ L    0.24-0.36    L      

      



             (test code = 415)                                        

 

             EOSINOPHILS ABSOLUTE COUNT 0.09 K/ L    0.04-0.36                 



             (BEAKER) (test code = 416)                                        

 

             BASOPHILS ABSOLUTE COUNT (BEAKER) 0.02 K/ L    0.01-0.08           

      



             (test code = 417)                                        

 

             IMMATURE GRANULOCYTES-RELATIVE 2 %          0-1          H         

   



             PERCENT (BEAKER) (test code =                                      

  



             2801)                                               



BLOOD GAS, [id number] 11:55:59





             Test Item    Value        Reference Range Interpretation Comments

 

             PH ARTERIAL (BEAKER) (test code = 7.32         7.35-7.45    L      

      



             383)                                                

 

             PCO2 ARTERIAL (BEAKER) (test code 34 mm Hg     35-45        L      

      



             = 384)                                              

 

             PO2 ARTERIAL (BEAKER) (test code 102 mm Hg    80-90        H       

     



             = 385)                                              

 

             O2 SATURATION ARTERIAL (BEAKER) 97.3 %       96.0-97.0    H        

    



             (test code = 386)                                        

 

             HCO3 ARTERIAL (BEAKER) (test code 17 mmol/L    21-29        L      

      



             = 388)                                              

 

             BASE EXCESS ARTERIAL (BEAKER) -8.5 mmol/L  -2.0-3.0     L          

  



             (test code = 387)                                        

 

             PATIENT TEMPERATURE (BEAKER) 37.0                                  

 



             (test code = 1818)                                        

 

             FIO2 (BEAKER) (test code = 1819) 32.0                              

     



SPIN/CONCENTRATION [id number] 09:05:01





             Test Item    Value        Reference Range Interpretation Comments

 

             CONCENTRATION CHARGED (BEAKER) (test Done                          

         



             code = 2657)                                        



RAD, CHEST, 1 VIEW, NON DEPT2021-10-01 08:55:00Reason for exam:-&gt;low c6Nyczse
this be performed at the bedside?-&gt;Yes
********************************************************[facility] 1950 Sex: 
F************************************************************FINAL REPORT 
PATIENT ID: [id number] INDICATION: low o2 COMPARISON: None TECHNIQUE: Single 
frontal view ofthe chest. FINDINGS: Lungs and pleura: Bilateral airspace 
opacities concerning for multifocal pneumonia versus multifocal edema. No 
effusion.Heart and mediastinum: Normal heart size. Unremarkable mediastinal 
contours.Osseous structures: No acute abnormality.Other: None. IMPRESSION: 
Bilateral airspace opacities concerning for multifocal pneumonia versus 
multifocal edema. Signed: Lorena Durham Verified Date/Time: 
10/01/2021 08:55:25 Reading Location: [facility] Radiology Reading Room Elec
tronically signed by: LORENA DURHAM MD on 10/01/2021 08:55 AMBASIC 
METABOLIC PANEL2021-10-01 06:45:49





             Test Item    Value        Reference Range Interpretation Comments

 

             SODIUM (BEAKER) 134 meq/L    136-145      L            



             (test code = 381)                                        

 

             POTASSIUM (BEAKER) 4.8 meq/L    3.5-5.1                   



             (test code = 379)                                        

 

             CHLORIDE (BEAKER) 108 meq/L           H            



             (test code = 382)                                        

 

             CO2 (BEAKER) (test 16 meq/L     22-29        L            



             code = 355)                                         

 

             BLOOD UREA NITROGEN 29 mg/dL     7-21         H            



             (BEAKER) (test code                                        



             = 354)                                              

 

             CREATININE (BEAKER) 2.26 mg/dL   0.57-1.25    H            



             (test code = 358)                                        

 

             GLUCOSE RANDOM 63 mg/dL            L            



             (BEAKER) (test code                                        



             = 652)                                              

 

             CALCIUM (BEAKER) 8.4 mg/dL    8.4-10.2                  



             (test code = 697)                                        

 

             EGFR (BEAKER) (test 21 mL/min/1.73                           ESTIMA

BRAEDEN GFR IS



             code = 1092) sq m                                   NOT AS ACCURATE

 AS



                                                                 CREATININE



                                                                 CLEARANCE IN



                                                                 PREDICTING



                                                                 GLOMERULAR



                                                                 FILTRATION RATE

.



                                                                 ESTIMATED GFR I

S



                                                                 NOT APPLICABLE 

FOR



                                                                 DIALYSIS PATIEN

TS.



 ID - PIAYA EPATIC FUNCTION PANEL2021-10-01 06:34:17





             Test Item    Value        Reference Range Interpretation Comments

 

             TOTAL PROTEIN (BEAKER) (test code = 4.6 gm/dL    6.0-8.3      L    

        



             770)                                                

 

             ALBUMIN (BEAKER) (test code = 1145) 2.5 g/dL     3.5-5.0      L    

        

 

             BILIRUBIN TOTAL (BEAKER) (test code 0.3 mg/dL    0.2-1.2           

        



             = 377)                                              

 

             BILIRUBIN DIRECT (BEAKER) (test 0.2 mg/dL    0.1-0.5               

    



             code = 706)                                         

 

             ALKALINE PHOSPHATASE (BEAKER) (test 55 U/L                   

        



             code = 346)                                         

 

             AST (SGOT) (BEAKER) (test code = 20 U/L       5-34                 

     



             353)                                                

 

             ALT (SGPT) (BEAKER) (test code = 6 U/L        6-55                 

     



             347)                                                



 CHERRIE ESCOBEDOKEN LMAGNESIUM2021-10-01 06:34:16





             Test Item    Value        Reference Range Interpretation Comments

 

             MAGNESIUM (BEAKER) (test code = 2.1 mg/dL    1.6-2.6               

    



             627)                                                



 CHERRIE POP LCBC W/PLT COUNT &amp; AUTO DIFFERENTIAL2021-10-01 05:22:25





             Test Item    Value        Reference Range Interpretation Comments

 

             WHITE BLOOD CELL COUNT 11.2 K/ L    3.5-10.5     H            



             (BEAKER) (test code =                                        



             775)                                                

 

             RED BLOOD CELL COUNT 2.88 M/ L    3.93-5.22    L            



             (BEAKER) (test code =                                        



             761)                                                

 

             HEMOGLOBIN (BEAKER) 8.8 GM/DL    11.2-15.7    L            Discorda

nt result



             (test code = 410)                                        compared t

o previous



                                                                 result. Clinica

l



                                                                 correlation req

uired

 

             HEMATOCRIT (BEAKER) 29.0 %       34.1-44.9    L            



             (test code = 411)                                        

 

             MEAN CORPUSCULAR 100.7 fL     79.4-94.8    H            



             VOLUME (BEAKER) (test                                        



             code = 753)                                         

 

             MEAN CORPUSCULAR 30.6 pg      25.6-32.2                 



             HEMOGLOBIN (BEAKER)                                        



             (test code = 751)                                        

 

             MEAN CORPUSCULAR 30.3 GM/DL   32.2-35.5    L            



             HEMOGLOBIN CONC                                        



             (BEAKER) (test code =                                        



             752)                                                

 

             RED CELL DISTRIBUTION 18.7 %       11.7-14.4    H            



             WIDTH (BEAKER) (test                                        



             code = 412)                                         

 

             PLATELET COUNT 94 K/CU MM   150-450      L            



             (BEAKER) (test code =                                        



             756)                                                

 

             MEAN PLATELET VOLUME 11.3 fL      9.4-12.3                  



             (BEAKER) (test code =                                        



             754)                                                

 

             NUCLEATED RED BLOOD 0 /100 WBC   0-0                       



             CELLS (BEAKER) (test                                        



             code = 413)                                         

 

             NEUTROPHILS RELATIVE 89 %                                   



             PERCENT (BEAKER) (test                                        



             code = 429)                                         

 

             LYMPHOCYTES RELATIVE 6 %                                    



             PERCENT (BEAKER) (test                                        



             code = 430)                                         

 

             MONOCYTES RELATIVE 3 %                                    



             PERCENT (BEAKER) (test                                        



             code = 431)                                         

 

             EOSINOPHILS RELATIVE 2 %                                    



             PERCENT (BEAKER) (test                                        



             code = 432)                                         

 

             BASOPHILS RELATIVE 0 %                                    



             PERCENT (BEAKER) (test                                        



             code = 437)                                         

 

             NEUTROPHILS ABSOLUTE 9.95 K/ L    1.56-6.13    H            



             COUNT (BEAKER) (test                                        



             code = 670)                                         

 

             LYMPHOCYTES ABSOLUTE 0.64 K/ L    1.18-3.74    L            



             COUNT (BEAKER) (test                                        



             code = 414)                                         

 

             MONOCYTES ABSOLUTE 0.35 K/ L    0.24-0.36                 



             COUNT (BEAKER) (test                                        



             code = 415)                                         

 

             EOSINOPHILS ABSOLUTE 0.17 K/ L    0.04-0.36                 



             COUNT (BEAKER) (test                                        



             code = 416)                                         

 

             BASOPHILS ABSOLUTE 0.02 K/ L    0.01-0.08                 



             COUNT (BEAKER) (test                                        



             code = 417)                                         

 

             IMMATURE     1 %          0-1                       



             GRANULOCYTES-RELATIVE                                        



             PERCENT (BEAKER) (test                                        



             code = 2801)                                        



FL, FLUORO, NON-SPECIFIC, UP TO 1 [id number] 09:48:00Reason for exam:-
&gt;CYSTOSCOPY LEFT URETERAL STENT PLACEMENT
************************************************************[facility] : 1950 Sex: 
F************************************************************Fluoroscopic unit 
utilized for a procedure performed in the OR. No interpretation was requested. 
Refer to the operative report for findings. Refer to PACS for patient radiation 
dose information.BASIC METABOLIC [id number] 01:44:56





             Test Item    Value        Reference Range Interpretation Comments

 

             SODIUM (BEAKER) 136 meq/L    136-145                   



             (test code = 381)                                        

 

             POTASSIUM (BEAKER) 4.9 meq/L    3.5-5.1                   Specimen 

slightly



             (test code = 379)                                        hemolyzed

 

             CHLORIDE (BEAKER) 109 meq/L           H            



             (test code = 382)                                        

 

             CO2 (BEAKER) (test 14 meq/L     22-29        L            



             code = 355)                                         

 

             BLOOD UREA NITROGEN 24 mg/dL     7-21         H            



             (BEAKER) (test code                                        



             = 354)                                              

 

             CREATININE (BEAKER) 2.45 mg/dL   0.57-1.25    H            Specimen

 slightly



             (test code = 358)                                        hemolyzed

 

             GLUCOSE RANDOM 93 mg/dL                         



             (BEAKER) (test code                                        



             = 652)                                              

 

             CALCIUM (BEAKER) 10.6 mg/dL   8.4-10.2     H            



             (test code = 697)                                        

 

             EGFR (BEAKER) (test 19 mL/min/1.73                           ESTIMA

BRAEDEN GFR IS



             code = 1092) sq m                                   NOT AS ACCURATE

 AS



                                                                 CREATININE



                                                                 CLEARANCE IN



                                                                 PREDICTING



                                                                 GLOMERULAR



                                                                 FILTRATION RATE

.



                                                                 ESTIMATED GFR I

S



                                                                 NOT APPLICABLE 

FOR



                                                                 DIALYSIS PATIEN

TS.



 ID - KIESHA [id number] 01:41:38





             Test Item    Value        Reference Range Interpretation Comments

 

             PHOSPHORUS (BEAKER) 2.9 mg/dL    2.3-4.7                   Specimen

 slightly



             (test code = 604)                                        hemolyzed



 ID - KIESHA [id number] 01:41:37





             Test Item    Value        Reference Range Interpretation Comments

 

             MAGNESIUM (BEAKER) 1.5 mg/dL    1.6-2.6      L            Specimen 

slightly



             (test code = 627)                                        hemolyzed



 ID - KIESHA [id number] 01:25:13





             Test Item    Value        Reference Range Interpretation Comments

 

             PARTIAL THROMBOPLASTIN TIME 34.8 seconds 22.5-36.0                 



             (BEAKER) (test code = 760)                                        



PROTHROMBIN [id number] 01:24:29





             Test Item    Value        Reference Range Interpretation Comments

 

             PROTIME (BEAKER) 14.5 seconds 11.9-14.2    H            



             (test code = 759)                                        

 

             INR (BEAKER) (test 1.15         See_Comment                [Automat

ed message]



             code = 370)                                         The system [employer]



                                                                 generated this 

result



                                                                 transmitted ref

erence



                                                                 range: <=5.90. 

The



                                                                 reference range

 was



                                                                 not used to int

erpret



                                                                 this result as



                                                                 normal/abnormal

.



RECOMMENDED COUMADIN/WARFARIN INR THERAPY RANGESSTANDARD DOSE: 2.0 - 3.0 
Includes: PROPHYLAXIS for venous thrombosis, systemic embolization; TREATMENT 
for venous thrombosis and/or pulmonary embolus.HIGH RISK: Target INR is 2.5-3.5 
for patients with mechanical heart valves.CBC (HEMOGRAM ONLY)2021 01:09:46





             Test Item    Value        Reference Range Interpretation Comments

 

             WHITE BLOOD CELL COUNT (BEAKER) 26.2 K/ L    3.5-10.5     H        

    



             (test code = 775)                                        

 

             RED BLOOD CELL COUNT (BEAKER) 3.81 M/ L    3.93-5.22    L          

  



             (test code = 761)                                        

 

             HEMOGLOBIN (BEAKER) (test code = 11.8 GM/DL   11.2-15.7            

     



             410)                                                

 

             HEMATOCRIT (BEAKER) (test code = 37.7 %       34.1-44.9            

     



             411)                                                

 

             MEAN CORPUSCULAR VOLUME (BEAKER) 99.0 fL      79.4-94.8    H       

     



             (test code = 753)                                        

 

             MEAN CORPUSCULAR HEMOGLOBIN 31.0 pg      25.6-32.2                 



             (BEAKER) (test code = 751)                                        

 

             MEAN CORPUSCULAR HEMOGLOBIN CONC 31.3 GM/DL   32.2-35.5    L       

     



             (BEAKER) (test code = 752)                                        

 

             RED CELL DISTRIBUTION WIDTH 18.7 %       11.7-14.4    H            



             (BEAKER) (test code = 412)                                        

 

             PLATELET COUNT (BEAKER) (test 187 K/CU MM  150-450                 

  



             code = 756)                                         

 

             MEAN PLATELET VOLUME (BEAKER) 11.8 fL      9.4-12.3                

  



             (test code = 754)                                        

 

             NUCLEATED RED BLOOD CELLS 0 /100 WBC   0-0                       



             (BEAKER) (test code = 413)                                        



SARS-COV2/RT-PCR ([facility]; [facility])2021 00:24:17





             Test Item    Value        Reference Range Interpretation Comments

 

             SARS-COV2/RT-PCR Negative     Negative                  The SARS-Co

V-2 target



             (test code =                                        nucleic acids a

re not



             [id number])                                            detected in thi

s specimen.



                                                                 Negative result

s do not



                                                                 preclude SARS-C

oV-2



                                                                 infection and s

hould not be



                                                                 used as the sol

e basis for



                                                                 patient managem

ent



                                                                 decisions. Nega

tive results



                                                                 must be combine

d with



                                                                 clinical observ

ations,



                                                                 patient history

, and



                                                                 epidemiological

 information.



                                                                 A false negativ

e result may



                                                                 occur if a spec

imen is



                                                                 improperly byron

ected,



                                                                 transported or 

handled. This



                                                                 SARS CoV-2 test

 is a rapid,



                                                                 real-time RT-PC

R test



                                                                 intended for th

e qualitative



                                                                 detection of nu

cleic acid



                                                                 from SARS-CoV-2

 in a



                                                                 nasopharyngeal 

swab specimen



                                                                 collected from 

individuals



                                                                 suspected of CO

VID-19 by



                                                                 their healthcar

e provider.



This test has been authorized by FDA under an EUA for use by authorized 
laboratories. This test is only authorized for the duration of the declaration 
that circumstances exist justifying the authorization of emergency use of in 
vitro diagnostic tests for detection and/or diagnosis of COVID-19 under Section 
564(b)(1) of the Federal Food, Drug and Cosmetic Act, 21 U.S.C. 360bbb-3(b)(1), 
unless the authorization is terminated or revoked sooner. Fact Sheet for 
Healthcare Providers: https://www.[employer].co
m/Documents/Xpert%20Xpress%20SARS%20CoV-2/Fact%20Sheets/3023802%03UYBN-KBQ-2%20

HEALTHCARE%20PROVIDERS%20FACT%20SHEET.pdf Fact Sheet for Healthcare Patients: 
https://www.[employer]/Documents/Xpert%20Xp
ress%20SARS%20CoV-2/Fact%20Sheets/3023801%15NFXP-XEG-7%20PATIENT%20FACT%20SHEET

.pdf

## 2023-01-11 NOTE — EKG
Test Date:    2023-01-10               Test Time:    12:36:05

Technician:   ROBINA                                     

                                                     

MEASUREMENT RESULTS:                                       

Intervals:                                           

Rate:         79                                     

IA:           178                                    

QRSD:         84                                     

QT:           318                                    

QTc:          364                                    

Axis:                                                

P:            64                                     

IA:           178                                    

QRS:          9                                      

T:            102                                    

                                                     

INTERPRETIVE STATEMENTS:                                       

                                                     

Normal sinus rhythm

Possible Left atrial enlargement

Low voltage QRS

Nonspecific T wave abnormality

Abnormal ECG

Compared to ECG 09/29/2021 12:02:38

Low QRS voltage now present

T-wave abnormality now present

Sinus bradycardia no longer present

First degree AV block no longer present

Left ventricular hypertrophy no longer present



Electronically Signed On 01-11-23 14:54:36 CST by Darvin Griffin

## 2023-02-28 NOTE — RAD REPORT
EXAM DESCRIPTION:  CT - [id number] - 7/7/2021 7:04 am

 

CLINICAL HISTORY:  Fall today

 

COMPARISON:  06/30/2021

 

TECHNIQUE:  Axial 5 mm thick images of the head were obtained. Axial 2 mm thick images of the cervica
l spine were obtained with sagittal and coronal reconstruction images generated and reviewed.

 

All CT scans are performed using dose optimization technique as appropriate and may include automated
 exposure control or mA/KV adjustment according to patient size.

 

FINDINGS:  No acute fracture or traumatic malalignment of the cervical spine. Mild scattered cervical
 spondylosis with varying degrees of neural foraminal narrowing. No central spinal stenosis. Trace an
terolisthesis of C2 on C3 is similar to 06/30/2021. Nonspecific thyroid nodules noted. The largest in
 the right lobe of thyroid measures approximately 2 centimeters.

 

Redemonstrated nondisplaced right zygomatic arch fracture. No acute intracranial hemorrhage. No mass 
effect or midline shift. No hydrocephalus. No mastoid effusion. Paranasal sinuses are well aerated. C
erebral atrophy with mild chronic small vessel ischemic changes. .

 

No paraspinal mass or hematoma.

 

IMPRESSION:  No acute intracranial abnormality or skull fracture.

 

No acute cervical spine fracture or traumatic malalignment. Naren Zafar had walk-in EKG completed on 02/28/23 at 5:39 PM by Gerry Johnson RN

## 2025-07-20 NOTE — EDPHYS
Physician Documentation                                                                           

 [facility]                                                                 

Name: Yarelis Maurice                                                                                

Age: 71 yrs                                                                                       

Sex: Female                                                                                       

: 1950                                                                                   

MRN: [id number]                                                                                   

Arrival Date: 2021                                                                          

Time: 18:20                                                                                       

Account#: [id number]                                                                            

Bed 9                                                                                             

Private MD:                                                                                       

ED Physician Neal Mejia                                                                      

HPI:                                                                                              

                                                                                             

19:15 This 71 yrs old Female presents to ER via Wheelchair with complaints of Knee Pain.      cp  

19:15 The patient presents with pain, that is acute.                                          cp  

19:15 The complaints affect the left knee. Context: resulted from an unknown cause, must have cp  

      assistance. Onset: The symptoms/episode began/occurred today. Modifying factors: the        

      symptoms are aggravated by movement, bending knee. Associated signs and symptoms:           

      Pertinent positives: swelling, Pertinent negatives calf tenderness, fever, numbness,        

      rash, warmth.                                                                               

                                                                                                  

Historical:                                                                                       

- Allergies:                                                                                      

18:43 No Known Allergies;                                                                     iw  

- PMHx:                                                                                           

18:43 born with 1 kidney; COPD; Hypothyroidism; Osteoporosis; Raynaud's Disease; Rheumatoid   iw  

      Arthritis; Scleroderma;                                                                     

                                                                                                  

- Immunization history:: Client reports receiving the 2nd dose of the Covid vaccine.              

- Social history:: Smoking status: Patient/guardian denies using tobacco.                         

                                                                                                  

                                                                                                  

ROS:                                                                                              

19:20 MS/extremity: Positive for pain, tenderness, of the left knee.                          cp  

19:20 Constitutional: Negative for body aches, chills, fever.                                 cp  

                                                                                                  

Exam:                                                                                             

19:30 Constitutional: The patient appears in no acute distress, alert, awake, non-toxic, well cp  

      developed, well nourished.                                                                  

19:30 Head/Face:  Normocephalic, atraumatic.                                                  cp  

19:30 Eyes: Periorbital structures: appear normal, Conjunctiva: normal, no exudate, no            

      injection, Sclera: no appreciated abnormality, Lids and lashes: appear normal,              

      bilaterally.                                                                                

19:30 ENT: External ear(s): are unremarkable, Nose: is normal, Mouth: Lips: moist, Oral           

      mucosa: moist, Posterior pharynx: Airway: no evidence of obstruction, patent.               

19:30 Chest/axilla: Inspection: normal.                                                           

19:30 Cardiovascular: Rate: normal.                                                               

19:30 Respiratory: the patient does not display signs of respiratory distress,  Respirations:     

      normal, no use of accessory muscles, no retractions.                                        

19:30 Abdomen/GI: Inspection: abdomen appears normal, Palpation: abdomen is soft and              

      non-tender, in all quadrants.                                                               

19:30 Musculoskeletal/extremity: Extremities: grossly normal except: noted in the left knee:      

      pain, mild prepatellar swelling and marked tenderness to palpation, ROM: limited            

      passive range of motion due to pain, in the left knee, Perfusion: the extremity is          

      normally perfused throughout, Sensation intact. overlying skin with no erythema, dry.       

                                                                                                  

Vital Signs:                                                                                      

18:42  / 51; Pulse 98; Resp 16; Temp 98.2; Pulse Ox 90% on R/A; Weight 67.13 kg; Height iw  

      5 ft. 4 in. (162.56 cm); Pain 10/10;                                                        

22:10  / 67; Pulse 66; Resp 18; Pulse Ox 94% on R/A;                                    mw2 

18:42 Body Mass Index 25.40 (67.13 kg, 162.56 cm)                                             iw  

                                                                                                  

MDM:                                                                                              

19:02 Patient medically screened.                                                             cp  

21:00 Differential diagnosis: tendonitis, low suspicion for septic joint, cellulitis,         cp  

      bursitis.                                                                                   

22:05 Data reviewed: vital signs, nurses notes, radiologic studies, plain films, ultrasound.  cp  

22:05 Test interpretation: by ED physician or midlevel provider: xrays of left knee negative  cp  

      for fracture. Counseling: I had a detailed discussion with the patient and/or guardian      

      regarding: the historical points, exam findings, and any diagnostic results supporting      

      the discharge/admit diagnosis, radiology results, the need for outpatient follow up,        

      for definitive care, a orthopedic surgeon, to return to the emergency department if         

      symptoms worsen or persist or if there are any questions or concerns that arise at          

      home. Response to treatment: the patient's symptoms have markedly improved after            

      treatment, Pain improved. Will discharge to home for continued monitoring.                  

                                                                                                  

                                                                                             

19:09 Order name: XRAY Knee LEFT 3 view; Complete Time: 19:59                                 cp  

                                                                                             

19:59 Interpretation: Report reviewed.                                                        cp  

                                                                                             

19:10 Order name: US Extremity Venous Unilateral Ltd; Complete Time: 19:59                    cp  

                                                                                             

20:32 Order name: IV; Complete Time: 20:45                                                    cp  

                                                                                                  

Administered Medications:                                                                         

19:38 Drug: Ibuprofen 800 mg Route: PO;                                                       aj1 

19:38 Drug: Tylenol 650 mg Route: PO;                                                         aj1 

19:38 Drug: UltRAM (traMADol) 50 mg Route: PO;                                                aj1 

20:16 Drug: fentaNYL (PF) 25 mcg Route: IM; Site: left deltoid;                               aj1 

21:09 Drug: NS 0.9% 500 ml Route: IV; Rate: 500 ml/hr; Site: left antecubital;                aj1 

21:09 Drug: fentaNYL (PF) 25 mcg Route: IVP; Site: left antecubital;                          aj1 

22:03 Drug: Ketorolac 15 mg Route: IVP; Site: left antecubital;                               aj1 

22:04 Drug: morphine 2 mg Route: IVP; Site: left antecubital;                                 aj 

                                                                                                  

                                                                                                  

Disposition:                                                                                      

22:15 Chart complete.                                                                         cp  

                                                                                                  

Disposition Summary:                                                                              

21 22:05                                                                                    

Discharge Ordered                                                                                 

      Location: Home                                                                          cp  

      Problem: new                                                                            cp  

      Symptoms: have improved                                                                 cp  

      Condition: Stable                                                                       cp  

      Diagnosis                                                                                   

        - Pain in left knee                                                                   cp  

      Followup:                                                                               cp  

        - With: William Carter MD                                                                  

        - When: 2 - 3 days                                                                         

        - Reason: left knee pain                                                                   

      Discharge Instructions:                                                                     

        - Discharge Summary Sheet                                                             cp  

        - Elastic Bandage and RICE Therapy                                                    cp  

        - Acute Knee Pain, Adult                                                              cp  

      Forms:                                                                                      

        - Medication Reconciliation Form                                                      cp  

        - Thank You Letter                                                                    cp  

        - Antibiotic Education                                                                cp  

        - Prescription Opioid Use                                                             cp  

      Prescriptions:                                                                              

        - Mobic 7.5 mg Oral Tablet                                                                 

            - take 1 tablet by ORAL route once daily take with food; 20 tablet; Refills: 0,   cp  

      Product Selection Permitted                                                                 

        - Tramadol 50 mg Oral Tablet                                                               

            - take 1 tablet by ORAL route every 8 hours as needed; 12 tablet; Refills: 0,     cp  

      Product Selection Permitted                                                                 

Addendum:                                                                                         

2021                                                                                        

     06:52 Co-signature as Attending Physician, Neal Mejia MD.                                 m


                                                                                                  

Signatures:                                                                                       

Dispatcher [employer]                           Jaleesa Nugent RN RN   aj1                                                  

Jayla York RN                     RN   iw                                                   

Robert Mack PA PA   cp                                                   

Neal Mejia MD MD   [id number]                                                  

                                                                                                  

Corrections: (The following items were deleted from the chart)                                    

                                                                                             

18:44 18:43 Allergies: Codeine; nausea; [facility]  

18:44 18:43 Allergies: Sulfa (Sulfonamide Antibiotics); [facility]  

                                                                                                  

**************************************************************************************************
169.9